# Patient Record
Sex: FEMALE | Race: OTHER | NOT HISPANIC OR LATINO
[De-identification: names, ages, dates, MRNs, and addresses within clinical notes are randomized per-mention and may not be internally consistent; named-entity substitution may affect disease eponyms.]

---

## 2017-06-05 ENCOUNTER — APPOINTMENT (OUTPATIENT)
Dept: CARDIOLOGY | Facility: CLINIC | Age: 47
End: 2017-06-05

## 2017-06-05 ENCOUNTER — NON-APPOINTMENT (OUTPATIENT)
Age: 47
End: 2017-06-05

## 2017-06-05 VITALS
WEIGHT: 146 LBS | HEART RATE: 86 BPM | HEIGHT: 66 IN | BODY MASS INDEX: 23.46 KG/M2 | SYSTOLIC BLOOD PRESSURE: 121 MMHG | OXYGEN SATURATION: 97 % | DIASTOLIC BLOOD PRESSURE: 80 MMHG

## 2017-06-05 DIAGNOSIS — Z82.3 FAMILY HISTORY OF STROKE: ICD-10-CM

## 2017-06-08 LAB — TSH SERPL-ACNC: 2.14 UIU/ML

## 2017-06-20 ENCOUNTER — APPOINTMENT (OUTPATIENT)
Dept: CV DIAGNOSITCS | Facility: HOSPITAL | Age: 47
End: 2017-06-20

## 2017-06-20 ENCOUNTER — OUTPATIENT (OUTPATIENT)
Dept: OUTPATIENT SERVICES | Facility: HOSPITAL | Age: 47
LOS: 1 days | End: 2017-06-20
Payer: COMMERCIAL

## 2017-06-20 DIAGNOSIS — R00.2 PALPITATIONS: ICD-10-CM

## 2017-06-20 PROCEDURE — 93016 CV STRESS TEST SUPVJ ONLY: CPT

## 2017-06-20 PROCEDURE — 93325 DOPPLER ECHO COLOR FLOW MAPG: CPT | Mod: 26,GC

## 2017-06-20 PROCEDURE — 93350 STRESS TTE ONLY: CPT | Mod: 26

## 2017-06-20 PROCEDURE — 93320 DOPPLER ECHO COMPLETE: CPT | Mod: 26,GC

## 2017-06-20 PROCEDURE — 93018 CV STRESS TEST I&R ONLY: CPT

## 2018-06-26 ENCOUNTER — RESULT REVIEW (OUTPATIENT)
Age: 48
End: 2018-06-26

## 2018-08-08 ENCOUNTER — RESULT REVIEW (OUTPATIENT)
Age: 48
End: 2018-08-08

## 2018-11-13 ENCOUNTER — FORM ENCOUNTER (OUTPATIENT)
Age: 48
End: 2018-11-13

## 2018-11-14 ENCOUNTER — OUTPATIENT (OUTPATIENT)
Dept: OUTPATIENT SERVICES | Facility: HOSPITAL | Age: 48
LOS: 1 days | End: 2018-11-14
Payer: COMMERCIAL

## 2018-11-14 ENCOUNTER — APPOINTMENT (OUTPATIENT)
Dept: MAMMOGRAPHY | Facility: IMAGING CENTER | Age: 48
End: 2018-11-14
Payer: COMMERCIAL

## 2018-11-14 ENCOUNTER — APPOINTMENT (OUTPATIENT)
Dept: ULTRASOUND IMAGING | Facility: IMAGING CENTER | Age: 48
End: 2018-11-14
Payer: COMMERCIAL

## 2018-11-14 DIAGNOSIS — R92.2 INCONCLUSIVE MAMMOGRAM: ICD-10-CM

## 2018-11-14 DIAGNOSIS — Z00.00 ENCOUNTER FOR GENERAL ADULT MEDICAL EXAMINATION WITHOUT ABNORMAL FINDINGS: ICD-10-CM

## 2018-11-14 PROCEDURE — 76641 ULTRASOUND BREAST COMPLETE: CPT | Mod: 26,50

## 2018-11-14 PROCEDURE — 76641 ULTRASOUND BREAST COMPLETE: CPT

## 2018-11-14 PROCEDURE — 77063 BREAST TOMOSYNTHESIS BI: CPT | Mod: 26

## 2018-11-14 PROCEDURE — 77067 SCR MAMMO BI INCL CAD: CPT | Mod: 26

## 2018-11-14 PROCEDURE — 77067 SCR MAMMO BI INCL CAD: CPT

## 2018-11-14 PROCEDURE — 77063 BREAST TOMOSYNTHESIS BI: CPT

## 2018-12-05 ENCOUNTER — OUTPATIENT (OUTPATIENT)
Dept: OUTPATIENT SERVICES | Facility: HOSPITAL | Age: 48
LOS: 1 days | End: 2018-12-05
Payer: COMMERCIAL

## 2018-12-05 ENCOUNTER — APPOINTMENT (OUTPATIENT)
Dept: RADIOLOGY | Facility: IMAGING CENTER | Age: 48
End: 2018-12-05
Payer: COMMERCIAL

## 2018-12-05 DIAGNOSIS — Z00.00 ENCOUNTER FOR GENERAL ADULT MEDICAL EXAMINATION WITHOUT ABNORMAL FINDINGS: ICD-10-CM

## 2018-12-05 PROCEDURE — 77080 DXA BONE DENSITY AXIAL: CPT | Mod: 26

## 2018-12-05 PROCEDURE — 77080 DXA BONE DENSITY AXIAL: CPT

## 2019-04-01 ENCOUNTER — TRANSCRIPTION ENCOUNTER (OUTPATIENT)
Age: 49
End: 2019-04-01

## 2019-06-05 ENCOUNTER — MESSAGE (OUTPATIENT)
Age: 49
End: 2019-06-05

## 2019-06-05 ENCOUNTER — APPOINTMENT (OUTPATIENT)
Dept: INTERNAL MEDICINE | Facility: CLINIC | Age: 49
End: 2019-06-05
Payer: COMMERCIAL

## 2019-06-05 VITALS
TEMPERATURE: 99.3 F | WEIGHT: 158 LBS | RESPIRATION RATE: 17 BRPM | HEIGHT: 66 IN | OXYGEN SATURATION: 97 % | SYSTOLIC BLOOD PRESSURE: 126 MMHG | DIASTOLIC BLOOD PRESSURE: 80 MMHG | HEART RATE: 83 BPM | BODY MASS INDEX: 25.39 KG/M2

## 2019-06-05 PROCEDURE — 99214 OFFICE O/P EST MOD 30 MIN: CPT

## 2019-11-11 ENCOUNTER — APPOINTMENT (OUTPATIENT)
Dept: INTERNAL MEDICINE | Facility: CLINIC | Age: 49
End: 2019-11-11

## 2020-02-06 ENCOUNTER — APPOINTMENT (OUTPATIENT)
Dept: MAMMOGRAPHY | Facility: IMAGING CENTER | Age: 50
End: 2020-02-06
Payer: COMMERCIAL

## 2020-02-06 ENCOUNTER — APPOINTMENT (OUTPATIENT)
Dept: ULTRASOUND IMAGING | Facility: IMAGING CENTER | Age: 50
End: 2020-02-06
Payer: COMMERCIAL

## 2020-02-06 ENCOUNTER — OUTPATIENT (OUTPATIENT)
Dept: OUTPATIENT SERVICES | Facility: HOSPITAL | Age: 50
LOS: 1 days | End: 2020-02-06
Payer: COMMERCIAL

## 2020-02-06 DIAGNOSIS — R92.2 INCONCLUSIVE MAMMOGRAM: ICD-10-CM

## 2020-02-06 DIAGNOSIS — Z00.00 ENCOUNTER FOR GENERAL ADULT MEDICAL EXAMINATION WITHOUT ABNORMAL FINDINGS: ICD-10-CM

## 2020-02-06 PROCEDURE — 77063 BREAST TOMOSYNTHESIS BI: CPT | Mod: 26

## 2020-02-06 PROCEDURE — 77067 SCR MAMMO BI INCL CAD: CPT | Mod: 26

## 2020-02-06 PROCEDURE — 76641 ULTRASOUND BREAST COMPLETE: CPT | Mod: 26,50

## 2020-02-06 PROCEDURE — 77063 BREAST TOMOSYNTHESIS BI: CPT

## 2020-02-06 PROCEDURE — 76641 ULTRASOUND BREAST COMPLETE: CPT

## 2020-02-06 PROCEDURE — 77067 SCR MAMMO BI INCL CAD: CPT

## 2020-10-19 ENCOUNTER — NON-APPOINTMENT (OUTPATIENT)
Age: 50
End: 2020-10-19

## 2020-10-19 ENCOUNTER — APPOINTMENT (OUTPATIENT)
Dept: CARDIOLOGY | Facility: CLINIC | Age: 50
End: 2020-10-19
Payer: COMMERCIAL

## 2020-10-19 VITALS
HEIGHT: 66 IN | DIASTOLIC BLOOD PRESSURE: 74 MMHG | OXYGEN SATURATION: 100 % | WEIGHT: 159 LBS | HEART RATE: 90 BPM | SYSTOLIC BLOOD PRESSURE: 118 MMHG | BODY MASS INDEX: 25.55 KG/M2

## 2020-10-19 PROCEDURE — 93000 ELECTROCARDIOGRAM COMPLETE: CPT

## 2020-10-19 PROCEDURE — 99204 OFFICE O/P NEW MOD 45 MIN: CPT

## 2020-10-19 RX ORDER — AMOXICILLIN 500 MG/1
500 TABLET, FILM COATED ORAL
Qty: 21 | Refills: 0 | Status: DISCONTINUED | COMMUNITY
Start: 2019-05-23 | End: 2020-10-19

## 2020-10-19 RX ORDER — CYCLOBENZAPRINE HYDROCHLORIDE 5 MG/1
5 TABLET, FILM COATED ORAL
Qty: 7 | Refills: 0 | Status: DISCONTINUED | COMMUNITY
Start: 2019-06-05 | End: 2020-10-19

## 2020-10-19 NOTE — HISTORY OF PRESENT ILLNESS
[FreeTextEntry1] : 50 year old odalis menopausal female with history of intermittent symptomatic palpitations that occur 1-2 times per month.\par \par She was last seen in the office in June of 2107 and was lost to follow up.. \par \par Palpitations are abrupt in onset that is not triggered by exertion or emotional distress. Of note, patient drinks little to no caffeine daily.\par \par This arrhythmia presents without provocation and can last between minutes to several hours in duration.\par Arrhythmia provokes the urge to cough, however,  patient has not tried to self terminate this heart rhythm with bearing down or vigorous coughing. \par \par She has an apple watch , which records heart rate 24 hours per day and she has graph results with sudden onset of rapid heart rate ranging in the high 100's to 120 bpm- as high as 160 bpm. \par \par Patient has undergone a stress echo in June of 2017. Testing revealed: \par Normal LV function\par LVEF 67%\par 11 METS of activity which was excellent for her age and gender. \par \par She returns today with an extensive record of her heart rate events. Most recent elevated heart rate at rest was on September 29, 2020.\par Max heart rate at 208 bpm recorded in August\par \par These elevated rates occur at most twice per month. Lasting seconds to a few minutes.\par She denies any dizziness, chest pain or shortness of breath.\par \par Patient presents today for a cardiac reevaluation. \par \par \par \par

## 2020-10-19 NOTE — REVIEW OF SYSTEMS
[Eyeglasses] : currently wearing eyeglasses [Shortness Of Breath] : shortness of breath [Palpitations] : palpitations [Negative] : Musculoskeletal

## 2020-10-19 NOTE — PHYSICAL EXAM
[General Appearance - Well Developed] : well developed [Well Groomed] : well groomed [Normal Appearance] : normal appearance [General Appearance - Well Nourished] : well nourished [No Deformities] : no deformities [General Appearance - In No Acute Distress] : no acute distress [Normal Conjunctiva] : the conjunctiva exhibited no abnormalities [Eyelids - No Xanthelasma] : the eyelids demonstrated no xanthelasmas [Normal Oral Mucosa] : normal oral mucosa [No Oral Pallor] : no oral pallor [Normal Jugular Venous A Waves Present] : normal jugular venous A waves present [No Oral Cyanosis] : no oral cyanosis [Normal Jugular Venous V Waves Present] : normal jugular venous V waves present [No Jugular Venous Richardson A Waves] : no jugular venous richardson A waves [Exaggerated Use Of Accessory Muscles For Inspiration] : no accessory muscle use [Respiration, Rhythm And Depth] : normal respiratory rhythm and effort [Auscultation Breath Sounds / Voice Sounds] : lungs were clear to auscultation bilaterally [Abdomen Soft] : soft [Abdomen Tenderness] : non-tender [Abdomen Mass (___ Cm)] : no abdominal mass palpated [Abnormal Walk] : normal gait [Nail Clubbing] : no clubbing of the fingernails [Cyanosis, Localized] : no localized cyanosis [Gait - Sufficient For Exercise Testing] : the gait was sufficient for exercise testing [Petechial Hemorrhages (___cm)] : no petechial hemorrhages [Skin Color & Pigmentation] : normal skin color and pigmentation [No Venous Stasis] : no venous stasis [Skin Lesions] : no skin lesions [] : no rash [No Skin Ulcers] : no skin ulcer [No Xanthoma] : no  xanthoma was observed [Affect] : the affect was normal [Oriented To Time, Place, And Person] : oriented to person, place, and time [Mood] : the mood was normal [No Anxiety] : not feeling anxious [Normal] : normal [Normal Rate] : normal [Rhythm Regular] : regular [Normal S1] : normal S1 [Normal S2] : normal S2 [2+] : Carotid: right 2+ [No Pitting Edema] : no pitting edema present [Rt] : no varicose veins of the right leg [Lt] : no varicose veins of the left leg

## 2020-10-19 NOTE — ASSESSMENT
[FreeTextEntry1] : 49 yo inititlaly seen in 2017 with episodes of rapid heart beat. She was starting odalis-menopause. Echo at time and labs were unrevealing. [facility] was cnacnelled.\par \par She returns today and symtpoms happen very infrequently but she is now wearing an apple watch and gave us records for several months. Episods of very rapid ( 150s) bpm. She also feels jame chest squeezing along with rhythm symtpoms. Not related to meals , stress. acitivy - Very frequestly during sleep.\par \par Will check thyroid , CMP today.\par Stress echo to exclude structural dz , exercise induced arrhthymia. Will order moniter although nor optimistic that we will identify anythignwith low frequency of episodes.

## 2020-10-19 NOTE — REASON FOR VISIT
[Initial Evaluation] : an initial evaluation of [FreeTextEntry2] : intermittent symptomatic palpitations- lasting minutes to hours

## 2020-10-20 LAB
ALBUMIN SERPL ELPH-MCNC: 4.8 G/DL
ALP BLD-CCNC: 65 U/L
ALT SERPL-CCNC: 18 U/L
ANION GAP SERPL CALC-SCNC: 15 MMOL/L
AST SERPL-CCNC: 17 U/L
BASOPHILS # BLD AUTO: 0.05 K/UL
BASOPHILS NFR BLD AUTO: 0.7 %
BILIRUB SERPL-MCNC: 0.4 MG/DL
BUN SERPL-MCNC: 9 MG/DL
CALCIUM SERPL-MCNC: 9.6 MG/DL
CHLORIDE SERPL-SCNC: 103 MMOL/L
CHOLEST SERPL-MCNC: 188 MG/DL
CHOLEST/HDLC SERPL: 3.6 RATIO
CO2 SERPL-SCNC: 24 MMOL/L
CREAT SERPL-MCNC: 0.65 MG/DL
EOSINOPHIL # BLD AUTO: 0.09 K/UL
EOSINOPHIL NFR BLD AUTO: 1.2 %
ESTIMATED AVERAGE GLUCOSE: 117 MG/DL
GLUCOSE SERPL-MCNC: 112 MG/DL
HBA1C MFR BLD HPLC: 5.7 %
HCT VFR BLD CALC: 43.8 %
HDLC SERPL-MCNC: 52 MG/DL
HGB BLD-MCNC: 14.3 G/DL
IMM GRANULOCYTES NFR BLD AUTO: 0.3 %
LDLC SERPL CALC-MCNC: 121 MG/DL
LYMPHOCYTES # BLD AUTO: 1.6 K/UL
LYMPHOCYTES NFR BLD AUTO: 21.9 %
MAN DIFF?: NORMAL
MCHC RBC-ENTMCNC: 29.6 PG
MCHC RBC-ENTMCNC: 32.6 GM/DL
MCV RBC AUTO: 90.7 FL
MONOCYTES # BLD AUTO: 0.5 K/UL
MONOCYTES NFR BLD AUTO: 6.8 %
NEUTROPHILS # BLD AUTO: 5.05 K/UL
NEUTROPHILS NFR BLD AUTO: 69.1 %
PLATELET # BLD AUTO: 319 K/UL
POTASSIUM SERPL-SCNC: 3.8 MMOL/L
PROT SERPL-MCNC: 7.5 G/DL
RBC # BLD: 4.83 M/UL
RBC # FLD: 12.8 %
SODIUM SERPL-SCNC: 141 MMOL/L
TRIGL SERPL-MCNC: 75 MG/DL
TSH SERPL-ACNC: 1.46 UIU/ML
WBC # FLD AUTO: 7.31 K/UL

## 2020-10-27 ENCOUNTER — APPOINTMENT (OUTPATIENT)
Dept: INTERNAL MEDICINE | Facility: CLINIC | Age: 50
End: 2020-10-27
Payer: COMMERCIAL

## 2020-10-27 VITALS
HEIGHT: 66 IN | WEIGHT: 159 LBS | TEMPERATURE: 97.6 F | HEART RATE: 77 BPM | SYSTOLIC BLOOD PRESSURE: 122 MMHG | DIASTOLIC BLOOD PRESSURE: 80 MMHG | BODY MASS INDEX: 25.55 KG/M2 | OXYGEN SATURATION: 100 % | RESPIRATION RATE: 17 BRPM

## 2020-10-27 DIAGNOSIS — Z00.00 ENCOUNTER FOR GENERAL ADULT MEDICAL EXAMINATION W/OUT ABNORMAL FINDINGS: ICD-10-CM

## 2020-10-27 DIAGNOSIS — Z87.39 PERSONAL HISTORY OF OTHER DISEASES OF THE MUSCULOSKELETAL SYSTEM AND CONNECTIVE TISSUE: ICD-10-CM

## 2020-10-27 DIAGNOSIS — R68.84 JAW PAIN: ICD-10-CM

## 2020-10-27 DIAGNOSIS — Z20.828 CONTACT WITH AND (SUSPECTED) EXPOSURE TO OTHER VIRAL COMMUNICABLE DISEASES: ICD-10-CM

## 2020-10-27 DIAGNOSIS — Z87.891 PERSONAL HISTORY OF NICOTINE DEPENDENCE: ICD-10-CM

## 2020-10-27 DIAGNOSIS — Z23 ENCOUNTER FOR IMMUNIZATION: ICD-10-CM

## 2020-10-27 DIAGNOSIS — I87.2 VENOUS INSUFFICIENCY (CHRONIC) (PERIPHERAL): ICD-10-CM

## 2020-10-27 DIAGNOSIS — Z87.898 PERSONAL HISTORY OF OTHER SPECIFIED CONDITIONS: ICD-10-CM

## 2020-10-27 DIAGNOSIS — E55.9 VITAMIN D DEFICIENCY, UNSPECIFIED: ICD-10-CM

## 2020-10-27 PROCEDURE — 90686 IIV4 VACC NO PRSV 0.5 ML IM: CPT

## 2020-10-27 PROCEDURE — 99072 ADDL SUPL MATRL&STAF TM PHE: CPT

## 2020-10-27 PROCEDURE — G0008: CPT

## 2020-10-27 PROCEDURE — 99396 PREV VISIT EST AGE 40-64: CPT | Mod: 25

## 2020-10-27 PROCEDURE — G0444 DEPRESSION SCREEN ANNUAL: CPT | Mod: NC,59

## 2020-10-27 RX ORDER — KETOROLAC TROMETHAMINE 10 MG/1
10 TABLET, FILM COATED ORAL
Qty: 15 | Refills: 0 | Status: DISCONTINUED | COMMUNITY
Start: 2019-06-03 | End: 2020-10-27

## 2020-10-27 RX ORDER — IBUPROFEN 800 MG/1
800 TABLET, FILM COATED ORAL
Qty: 21 | Refills: 0 | Status: DISCONTINUED | COMMUNITY
Start: 2019-05-30 | End: 2020-10-27

## 2020-10-27 RX ORDER — NORGESTIMATE AND ETHINYL ESTRADIOL 7DAYSX3 LO
0.18/0.215/0.25 KIT ORAL
Qty: 84 | Refills: 0 | Status: DISCONTINUED | COMMUNITY
Start: 2019-05-07 | End: 2020-10-27

## 2020-10-28 DIAGNOSIS — E55.9 VITAMIN D DEFICIENCY, UNSPECIFIED: ICD-10-CM

## 2020-10-28 LAB
25(OH)D3 SERPL-MCNC: 19.1 NG/ML
ANION GAP SERPL CALC-SCNC: 15 MMOL/L
BUN SERPL-MCNC: 7 MG/DL
CALCIUM SERPL-MCNC: 10.4 MG/DL
CHLORIDE SERPL-SCNC: 99 MMOL/L
CHOLEST SERPL-MCNC: 190 MG/DL
CO2 SERPL-SCNC: 26 MMOL/L
CREAT SERPL-MCNC: 0.66 MG/DL
ESTIMATED AVERAGE GLUCOSE: 114 MG/DL
GLUCOSE SERPL-MCNC: 98 MG/DL
HBA1C MFR BLD HPLC: 5.6 %
HDLC SERPL-MCNC: 53 MG/DL
LDLC SERPL CALC-MCNC: 120 MG/DL
NONHDLC SERPL-MCNC: 137 MG/DL
POTASSIUM SERPL-SCNC: 4.4 MMOL/L
SARS-COV-2 IGG SERPL IA-ACNC: 0.11 INDEX
SARS-COV-2 IGG SERPL QL IA: NEGATIVE
SODIUM SERPL-SCNC: 140 MMOL/L
TRIGL SERPL-MCNC: 84 MG/DL

## 2020-11-13 ENCOUNTER — APPOINTMENT (OUTPATIENT)
Dept: CV DIAGNOSITCS | Facility: HOSPITAL | Age: 50
End: 2020-11-13
Payer: COMMERCIAL

## 2020-11-13 ENCOUNTER — INPATIENT (INPATIENT)
Facility: HOSPITAL | Age: 50
LOS: 0 days | Discharge: HOME | DRG: 316 | End: 2020-11-14
Attending: INTERNAL MEDICINE | Admitting: INTERNAL MEDICINE
Payer: COMMERCIAL

## 2020-11-13 ENCOUNTER — NON-APPOINTMENT (OUTPATIENT)
Age: 50
End: 2020-11-13

## 2020-11-13 ENCOUNTER — OUTPATIENT (OUTPATIENT)
Dept: OUTPATIENT SERVICES | Facility: HOSPITAL | Age: 50
LOS: 1 days | End: 2020-11-13
Payer: COMMERCIAL

## 2020-11-13 VITALS
OXYGEN SATURATION: 100 % | DIASTOLIC BLOOD PRESSURE: 91 MMHG | SYSTOLIC BLOOD PRESSURE: 130 MMHG | WEIGHT: 158.95 LBS | HEART RATE: 94 BPM | RESPIRATION RATE: 20 BRPM | HEIGHT: 66 IN | TEMPERATURE: 98 F

## 2020-11-13 DIAGNOSIS — R00.2 PALPITATIONS: ICD-10-CM

## 2020-11-13 DIAGNOSIS — I31.3 PERICARDIAL EFFUSION (NONINFLAMMATORY): ICD-10-CM

## 2020-11-13 LAB
ALBUMIN SERPL ELPH-MCNC: 4.7 G/DL — SIGNIFICANT CHANGE UP (ref 3.3–5)
ALP SERPL-CCNC: 57 U/L — SIGNIFICANT CHANGE UP (ref 40–120)
ALT FLD-CCNC: 17 U/L — SIGNIFICANT CHANGE UP (ref 10–45)
ANION GAP SERPL CALC-SCNC: 14 MMOL/L — SIGNIFICANT CHANGE UP (ref 5–17)
APTT BLD: 27.8 SEC — SIGNIFICANT CHANGE UP (ref 27.5–35.5)
AST SERPL-CCNC: 18 U/L — SIGNIFICANT CHANGE UP (ref 10–40)
BASOPHILS # BLD AUTO: 0.05 K/UL — SIGNIFICANT CHANGE UP (ref 0–0.2)
BASOPHILS NFR BLD AUTO: 0.7 % — SIGNIFICANT CHANGE UP (ref 0–2)
BILIRUB SERPL-MCNC: 0.2 MG/DL — SIGNIFICANT CHANGE UP (ref 0.2–1.2)
BLD GP AB SCN SERPL QL: NEGATIVE — SIGNIFICANT CHANGE UP
BUN SERPL-MCNC: 8 MG/DL — SIGNIFICANT CHANGE UP (ref 7–23)
CALCIUM SERPL-MCNC: 9.8 MG/DL — SIGNIFICANT CHANGE UP (ref 8.4–10.5)
CHLORIDE SERPL-SCNC: 105 MMOL/L — SIGNIFICANT CHANGE UP (ref 96–108)
CO2 SERPL-SCNC: 22 MMOL/L — SIGNIFICANT CHANGE UP (ref 22–31)
CREAT SERPL-MCNC: 0.61 MG/DL — SIGNIFICANT CHANGE UP (ref 0.5–1.3)
EOSINOPHIL # BLD AUTO: 0.14 K/UL — SIGNIFICANT CHANGE UP (ref 0–0.5)
EOSINOPHIL NFR BLD AUTO: 1.9 % — SIGNIFICANT CHANGE UP (ref 0–6)
GLUCOSE SERPL-MCNC: 101 MG/DL — HIGH (ref 70–99)
HCT VFR BLD CALC: 42 % — SIGNIFICANT CHANGE UP (ref 34.5–45)
HGB BLD-MCNC: 14.2 G/DL — SIGNIFICANT CHANGE UP (ref 11.5–15.5)
IMM GRANULOCYTES NFR BLD AUTO: 0.1 % — SIGNIFICANT CHANGE UP (ref 0–1.5)
INR BLD: 1.07 RATIO — SIGNIFICANT CHANGE UP (ref 0.88–1.16)
LYMPHOCYTES # BLD AUTO: 2.18 K/UL — SIGNIFICANT CHANGE UP (ref 1–3.3)
LYMPHOCYTES # BLD AUTO: 29.2 % — SIGNIFICANT CHANGE UP (ref 13–44)
MAGNESIUM SERPL-MCNC: 1.9 MG/DL — SIGNIFICANT CHANGE UP (ref 1.6–2.6)
MCHC RBC-ENTMCNC: 29.6 PG — SIGNIFICANT CHANGE UP (ref 27–34)
MCHC RBC-ENTMCNC: 33.8 GM/DL — SIGNIFICANT CHANGE UP (ref 32–36)
MCV RBC AUTO: 87.7 FL — SIGNIFICANT CHANGE UP (ref 80–100)
MONOCYTES # BLD AUTO: 0.64 K/UL — SIGNIFICANT CHANGE UP (ref 0–0.9)
MONOCYTES NFR BLD AUTO: 8.6 % — SIGNIFICANT CHANGE UP (ref 2–14)
NEUTROPHILS # BLD AUTO: 4.44 K/UL — SIGNIFICANT CHANGE UP (ref 1.8–7.4)
NEUTROPHILS NFR BLD AUTO: 59.5 % — SIGNIFICANT CHANGE UP (ref 43–77)
NRBC # BLD: 0 /100 WBCS — SIGNIFICANT CHANGE UP (ref 0–0)
PHOSPHATE SERPL-MCNC: 3.5 MG/DL — SIGNIFICANT CHANGE UP (ref 2.5–4.5)
PLATELET # BLD AUTO: 325 K/UL — SIGNIFICANT CHANGE UP (ref 150–400)
POTASSIUM SERPL-MCNC: 3.7 MMOL/L — SIGNIFICANT CHANGE UP (ref 3.5–5.3)
POTASSIUM SERPL-SCNC: 3.7 MMOL/L — SIGNIFICANT CHANGE UP (ref 3.5–5.3)
PROT SERPL-MCNC: 7.7 G/DL — SIGNIFICANT CHANGE UP (ref 6–8.3)
PROTHROM AB SERPL-ACNC: 12.8 SEC — SIGNIFICANT CHANGE UP (ref 10.6–13.6)
RBC # BLD: 4.79 M/UL — SIGNIFICANT CHANGE UP (ref 3.8–5.2)
RBC # FLD: 12.4 % — SIGNIFICANT CHANGE UP (ref 10.3–14.5)
RH IG SCN BLD-IMP: NEGATIVE — SIGNIFICANT CHANGE UP
SARS-COV-2 RNA SPEC QL NAA+PROBE: SIGNIFICANT CHANGE UP
SODIUM SERPL-SCNC: 141 MMOL/L — SIGNIFICANT CHANGE UP (ref 135–145)
WBC # BLD: 7.46 K/UL — SIGNIFICANT CHANGE UP (ref 3.8–10.5)
WBC # FLD AUTO: 7.46 K/UL — SIGNIFICANT CHANGE UP (ref 3.8–10.5)

## 2020-11-13 PROCEDURE — 93306 TTE W/DOPPLER COMPLETE: CPT

## 2020-11-13 PROCEDURE — 99443: CPT

## 2020-11-13 PROCEDURE — 99232 SBSQ HOSP IP/OBS MODERATE 35: CPT

## 2020-11-13 PROCEDURE — 99291 CRITICAL CARE FIRST HOUR: CPT

## 2020-11-13 PROCEDURE — 93010 ELECTROCARDIOGRAM REPORT: CPT

## 2020-11-13 PROCEDURE — 93306 TTE W/DOPPLER COMPLETE: CPT | Mod: 26

## 2020-11-13 RX ORDER — CHLORHEXIDINE GLUCONATE 213 G/1000ML
1 SOLUTION TOPICAL
Refills: 0 | Status: DISCONTINUED | OUTPATIENT
Start: 2020-11-13 | End: 2020-11-14

## 2020-11-13 RX ORDER — MAGNESIUM SULFATE 500 MG/ML
1 VIAL (ML) INJECTION ONCE
Refills: 0 | Status: COMPLETED | OUTPATIENT
Start: 2020-11-13 | End: 2020-11-13

## 2020-11-13 RX ORDER — ERGOCALCIFEROL 1.25 MG/1
50000 CAPSULE ORAL
Refills: 0 | Status: DISCONTINUED | OUTPATIENT
Start: 2020-11-13 | End: 2020-11-14

## 2020-11-13 RX ORDER — POTASSIUM CHLORIDE 20 MEQ
20 PACKET (EA) ORAL ONCE
Refills: 0 | Status: COMPLETED | OUTPATIENT
Start: 2020-11-13 | End: 2020-11-13

## 2020-11-13 RX ADMIN — Medication 20 MILLIEQUIVALENT(S): at 20:49

## 2020-11-13 RX ADMIN — Medication 100 GRAM(S): at 20:49

## 2020-11-13 NOTE — ED PROCEDURE NOTE - ATTENDING CONTRIBUTION TO CARE
Dr. Wyman (Attending Physician)  I supervised the above procedure and agree with the documented note.

## 2020-11-13 NOTE — ED PROVIDER NOTE - PROGRESS NOTE DETAILS
Dr. Pawel Gracia, PGY-3: spoke w/ cards. Pt TBA CCU for planned drainage of pericardial effusion. Pre op labs ordered. Stat COVID sent. Informed pt of this. CCU under Dr. Correa Dr. Pawel Gracia, PGY-3: spoke w/ cards. There is concern on their ECHO for early signs of tamponade. Pt TBA CCU for planned drainage of pericardial effusion. Pre op labs ordered. Stat COVID sent. Informed pt of this. CCU under Dr. Correa

## 2020-11-13 NOTE — H&P ADULT - NSHPREVIEWOFSYSTEMS_GEN_ALL_CORE
REVIEW OF SYSTEMS:  CONSTITUTIONAL: No fever, chills, night sweats, or fatigue  EYES: No eye pain, visual disturbances, or discharge  ENMT:  No difficulty hearing, tinnitus, vertigo; No sinus or throat pain  NECK: No pain   RESPIRATORY: No cough, wheezing, or hemoptysis; No shortness of breath  CARDIOVASCULAR: No chest pain, + palpitations, no dizziness, or leg swelling  GASTROINTESTINAL: No abdominal or epigastric pain. No nausea, vomiting, or hematemesis; No diarrhea or constipation. No melena or hematochezia.  GENITOURINARY: No dysuria, frequency  NEUROLOGICAL: No headaches, memory loss, loss of strength, numbness, or tremors  SKIN: No rashes   LYMPH NODES: No enlarged glands  ENDOCRINE: No heat or cold intolerance; No hair loss  MUSCULOSKELETAL: + joint swelling  PSYCHIATRIC: No depression  HEME/LYMPH: No easy bruising, or bleeding gums

## 2020-11-13 NOTE — H&P ADULT - HISTORY OF PRESENT ILLNESS
This is a 49 y/o F w/ no significant PMH presenting w/ abnormal echo showing pericardial effusion sent from [facility] US lab. Pt reports intermittent palpitations and squeezing sensation in her chest for many years. States that they occur about 2 times a month. Symptoms worsened over the summer which prompted her to f/u w/ cardiologist Dr. Mittal. Pt reports that 1 week ago, she woke up with palpitations and a chest squeezing sensation that resolved after 24 hours. Denies any pain, exertional, or pleuritic components. Dr. Mittal scheduled Echo for today and pt was told to go directly to the ED after it was performed. No hx of prior pericardial effusions. No recent viral illnesses. Had stress echo done in 2017 due to palpitations which was normal. Of note, had a left dental implant done a week ago on 11/4 and completed a 7 day course of amoxicillin. Also has chronic joint stiffness and pain, mostly in the upper thighs, for which she is planning to be seen by Rheumatology.     Denies fevers, chills, headache, dizziness, blurry vision, chest pain, cough, shortness of breath, abdominal pain, n/v/d/c, urinary symptoms, MSK pain, rash, sick contact, sore throat.    Pt's vitals were T 98.7  /80 RR 18 POX 98% RA. Pt taken to cath labs but there was no pocket to do pericardiocentesis, pt transferred to CCU for further monitoring. This is a 49 y/o F w/ no significant PMH presenting w/ abnormal echo showing pericardial effusion sent from [facility] US lab. Pt reports intermittent palpitations and squeezing sensation in her chest for many years. States that they occur about 2 times a month. Symptoms worsened over the summer which prompted her to f/u w/ cardiologist Dr. Mittal. Pt reports that 1 week ago, she woke up with palpitations and a chest squeezing sensation that resolved after 24 hours. Denies any pain, exertional, or pleuritic components. Dr. Mittal scheduled Echo for today and pt was told to go directly to the ED after it was performed. No hx of prior pericardial effusions. No recent viral illnesses. Had stress echo done in 2017 due to palpitations which was normal. Of note, had a left dental implant done a week ago on 11/4 and completed a 7 day course of amoxicillin. Also has chronic joint stiffness and pain, mostly in the upper thighs, for which she is planning to be seen by Rheumatology.     Denies fevers, chills, headache, dizziness, blurry vision, cough, shortness of breath, abdominal pain, n/v/d/c, urinary symptoms, rash, sick contact, sore throat.    Pt's vitals were T 98.7  /80 RR 18 POX 98% RA. Pt taken to cath labs but there was no pocket to do pericardiocentesis, pt transferred to CCU for further monitoring.

## 2020-11-13 NOTE — ED PROVIDER NOTE - OBJECTIVE STATEMENT
51 y/o F w/ no sig PMH 51 y/o F w/ no sig PMH presenting w/ abnormal echo. Trauma room C. Pt sent from [facility] US lab. Was found to have pericardial effusion. Pt reports intermittent palpitations and squeezing sensation in her chest for many years. Symptoms worsened over the summer which prompted her to f/u w/ cardiologist Dr. Mittal. Was scheduled for an ECHO a few weeks ago but was unable to go. Last week had another episode of symptoms, but this time they lasted longer which caused her to be concerned. Dr. Mittal scheduled Echo for today and pt was told to go directly to the ED after it was performed. No hx of prior pericardial effusions. No recent viral illnesses. Had stress/echo 3 years ago that she reports is normal. Is planning to be seen by rheum for chronic muscle aches. Currently feels well. Denies fevers, chills, headache, dizziness, blurred vision, chest pain, cough, shortness of breath, abdominal pain, n/v/d/c, urinary symptoms, MSK pain, rash.

## 2020-11-13 NOTE — ED ADULT NURSE NOTE - CHPI ED NUR SYMPTOMS NEG
no weakness/no decreased eating/drinking/no tingling/no dizziness/no pain/no nausea/no chills/no fever/no vomiting

## 2020-11-13 NOTE — H&P ADULT - NSHPLABSRESULTS_GEN_ALL_CORE
14.2   7.46  )-----------( 325      ( 13 Nov 2020 17:02 )             42.0       11-13    141  |  105  |  8   ----------------------------<  101<H>  3.7   |  22  |  0.61    Ca    9.8      13 Nov 2020 17:02  Phos  3.5     11-13  Mg     1.9     11-13    TPro  7.7  /  Alb  4.7  /  TBili  0.2  /  DBili  x   /  AST  18  /  ALT  17  /  AlkPhos  57  11-13                  PT/INR - ( 13 Nov 2020 17:02 )   PT: 12.8 sec;   INR: 1.07 ratio         PTT - ( 13 Nov 2020 17:02 )  PTT:27.8 sec    Lactate Trend    < from: Transthoracic Echocardiogram (11.13.20 @ 13:41) >    Conclusions:  1. Normal left ventricular systolic function. No segmental  wall motion abnormalities.  2. Normal right ventricular size and function.  3. Estimated pulmonary artery systolic pressure equals 22  mm Hg, assuming right atrial pressure equals 8  mm Hg,  consistent with normal pulmonary pressures.  4. Large pericardial effusion. 1.3cm at posterior wall,1.4  lateral  2.15cm at the RV free wall and apex.  Resp.Variation noted at the mitral valve. There is evidence   of  RA invagination and Impaired RV filling and diastolic  collapse noted. These findings are consistent with  Tamponade physiology .  Discussed with Dr. Mittal/L[place]  *** No previous Echo exam.    < end of copied text >    < from: Cardiac Cath Lab - Adult (11.13.20 @ 18:43) >    DIAGNOSTIC RECOMMENDATIONS: Patient reaccessed in cardiac catheterization  laboratory with 4 chamber TTE done by echocardiographer and subcoastal  imaging by IC. Much reduced fluid compared to study earlier in day, even  with multiple positions using wedge, measuring less than < 2 cm in  multiple windows. CCU attending at bedside in cardiac catheterization  laboratory, who had visualized TTE earlier, in aggreement of reduction of  pericardial fluid. Risks of TAP evaluated to be higher than benefits at  this time given /90 and HR 90 with minimal space for adequate  pericardial space puncture. Plan to monitor hemodynamics closely in CCU,  repeat TTE in the morning to inform on conservative vs. invasive  management. Plan discussed with CCU team and fellow overnight.    < end of copied text >

## 2020-11-13 NOTE — ED PROVIDER NOTE - PHYSICAL EXAMINATION
Gen: NAD, AOx3, able to make needs known, non-toxic  Head: NCAT  HEENT: EOMI, oral mucosa moist, normal conjunctiva  Lung: CTAB, no respiratory distress, no wheezes/rhonchi/rales B/L, speaking in full sentences  CV: Tachycardic, no murmurs  Abd: soft, NTND, no guarding  MSK: no visible deformities  Neuro: Appears non focal  Skin: Warm, well perfused  Psych: normal affect

## 2020-11-13 NOTE — ED PROVIDER NOTE - ATTENDING CONTRIBUTION TO CARE
Dr. Wyman (Attending Physician)  I performed a history and physical exam of the patient and discussed their management with the resident. I reviewed the resident's note and agree with the documented findings and plan of care. My medical decision making and observations are found above.

## 2020-11-13 NOTE — H&P ADULT - NSHPSOCIALHISTORY_GEN_ALL_CORE
Lives at home with  and children. Social alcohol, former smoker, quit years ago. No recreational drug use

## 2020-11-13 NOTE — ED PROVIDER NOTE - CLINICAL SUMMARY MEDICAL DECISION MAKING FREE TEXT BOX
Dr. Wyman (Attending Physician)  Pt. pw palpitations, squeezing chest pain x years found to have tamponade physiology on outpt stress echo. +JVD, Not hypotension, borderline tachycardic. Will check labs, trop, cxr, admit to CCU. D/W cards fellow.

## 2020-11-13 NOTE — ED ADULT NURSE NOTE - OBJECTIVE STATEMENT
Pt is a 50y F p/w palpitations, squeezing chest pain x years. Pt had outpatient workup and was found to have pericardial effusion/tamponade appearance. +JVD. Pt denies N/V, dizziness, SOB, abdominal pain, fevers, cough, flu-like symptoms. A&Ox4, LEDESMA, distal pulses intact, abdomen soft nontender, skin intact. Side rails up for safety, call bell and personal items within reach, instructed to call for assistance, verbalizes understanding. Will continue to monitor.

## 2020-11-13 NOTE — H&P ADULT - ASSESSMENT
This is a 51 y/o F w/ no significant PMH presenting w/ abnormal echo showing large pericardial effusion with RA invagination and impaired RV filling and diastolic collapse noted c/w cardiac tamponade. Admitted to CCU for hemodynamic monitoring.     #Neuro  - AOx4  - No active issues     #Pulmonary   - On room air  - No active issues     #Cardiovascular   Cardiac tamponade   - Intermittent palpitations and chest squeezing for years, recently worsening in frequency   - No recent viral illnesses   - TTE 11/3 showing large pericardial effusion with evidence of RA invagination and impaired RV filling and diastolic collapse noted consistent with tamponade, normal LV systolic function, no WMAs, normal RV size and function, normal pulmonary pressures   - Pt taken to cath lab 11/13 with much reduced pericardial fluid compared to TTE earlier today. Risks of pericardiocentesis evaluated to be higher than benefits given /90 and HR 90 with minimal space for adequate pericardial space puncture  - Monitor hemodynamics closely   - Repeat TTE 11/14 to assess for conservative vs. invasive management   - F/u ESR, CRP given chronic joint pain and stiffness     #GI   - Regular diet    #Renal  - SCr 0.61  - No active issues    #Endo  - C/w vitamin D  - F/u A1c, TSH     #Heme  - No active issues     #ID  - Afebrile, no leukocytosis  - No active issues This is a 49 y/o F w/ no significant PMH presenting w/ abnormal echo showing large pericardial effusion with RA invagination and impaired RV filling and diastolic collapse noted c/w cardiac tamponade. Admitted to CCU for hemodynamic monitoring.     #Neuro  - AOx4  - No active issues     #Pulmonary   - On room air  - No active issues     #Cardiovascular   Cardiac tamponade   - Intermittent palpitations and chest squeezing for years, recently worsening in frequency   - No recent viral illnesses   - TTE 11/3 showing large pericardial effusion with evidence of RA invagination and impaired RV filling and diastolic collapse noted consistent with tamponade, normal LV systolic function, no WMAs, normal RV size and function, normal pulmonary pressures   - Pt taken to cath lab 11/13 with much reduced pericardial fluid compared to TTE earlier today. Risks of pericardiocentesis evaluated to be higher than benefits given /90 and HR 90 with minimal space for adequate pericardial space puncture  - Monitor hemodynamics closely   - Repeat TTE 11/14 to assess for conservative vs. invasive management   - F/u ESR, CRP given chronic joint pain and stiffness     #GI   - Regular diet    #Renal  - SCr 0.61  - No active issues    #Endo  - C/w vitamin D  - F/u A1c, TSH     #Heme  - No active issues     #ID  - Afebrile, no leukocytosis  - No active issues     #DVT PPX  - IMPROVE score 0    #GOC  - Full code This is a 51 y/o F w/ no significant PMH presenting w/ abnormal echo showing large pericardial effusion with RA invagination and impaired RV filling and diastolic collapse noted c/w cardiac tamponade. Admitted to CCU for hemodynamic monitoring.     #Neuro  - AOx4  - No active issues     #Pulmonary   - On room air  - No active issues     #Cardiovascular   Cardiac tamponade   - Intermittent palpitations and chest squeezing for years, recently worsening in frequency   - TTE 11/3 showing large pericardial effusion with evidence of RA invagination and impaired RV filling and diastolic collapse noted consistent with tamponade, normal LV systolic function, no WMAs, normal RV size and function, normal pulmonary pressures   - Pt taken to cath lab 11/13 with much reduced pericardial fluid compared to TTE earlier today. Risks of pericardiocentesis evaluated to be higher than benefits given /90 and HR 90 with minimal space for adequate pericardial space puncture  - Monitor hemodynamics closely   - Repeat TTE 11/14 to assess for conservative vs. invasive management   - F/u ESR, CRP given chronic joint pain and stiffness     #GI   - Regular diet    #Renal  - SCr 0.61  - No active issues    #Endo  - C/w vitamin D  - F/u A1c, TSH     #Heme  - No active issues     #ID  - Afebrile, no leukocytosis  - No active issues     #DVT PPX  - IMPROVE score 0    #GOC  - Full code This is a 49 y/o F w/ no significant PMH presenting w/ abnormal echo showing large pericardial effusion with RA invagination and impaired RV filling and diastolic collapse noted c/w cardiac tamponade. Admitted to CCU for hemodynamic monitoring.     #Neuro  - AOx4  - No active issues     #Pulmonary   - On room air  - No active issues     #Cardiovascular   Cardiac tamponade   - Intermittent palpitations and chest squeezing for years, recently worsening in frequency   - TTE 11/3 showing large pericardial effusion with evidence of RA invagination and impaired RV filling and diastolic collapse noted consistent with tamponade, normal LV systolic function, no WMAs, normal RV size and function, normal pulmonary pressures   - Etiology unclear, up-to-date on cancer screenings, differential also includes infectious, autoimmune (given joint stiffness symptoms), hypothyroidism   - Pt taken to cath lab 11/13 with much reduced pericardial fluid compared to TTE earlier today. Risks of pericardiocentesis evaluated to be higher than benefits given /90 and HR 90 with minimal space for adequate pericardial space puncture  - Monitor hemodynamics closely   - Repeat TTE 11/14 to assess for conservative vs. invasive management   - F/u ESR, CRP given chronic joint pain and stiffness     #GI   - Regular diet    #Renal  - SCr 0.61  - No active issues    #Endo  - C/w vitamin D  - F/u A1c, TSH     #Heme  - No active issues     #ID  - Afebrile, no leukocytosis  - No active issues     #DVT PPX  - IMPROVE score 0    #GOC  - Full code

## 2020-11-13 NOTE — CHART NOTE - NSCHARTNOTEFT_GEN_A_CORE
patient assessed in echo lab, describes palpitations and vague chest discomfort. Vitals notable for tachycardia, low 100s on my assessment however documented in 140s during TTE. Images reviewed - echocardiographic evidence of tamponade. Discussed with interventional and CCU team, plan for transfer to ED for routine labs and rapid COVID swab followed by cath lab for drainage followed by admission to CCU.

## 2020-11-13 NOTE — H&P ADULT - NSHPPHYSICALEXAM_GEN_ALL_CORE
ICU Vital Signs Last 24 Hrs  T(C): 37.1 (13 Nov 2020 19:40), Max: 37.1 (13 Nov 2020 19:40)  T(F): 98.7 (13 Nov 2020 19:40), Max: 98.7 (13 Nov 2020 19:40)  HR: 100 (13 Nov 2020 20:00) (74 - 100)  BP: 147/76 (13 Nov 2020 20:00) (126/78 - 153/89)  BP(mean): 105 (13 Nov 2020 20:00) (95 - 105)  ABP: --  ABP(mean): --  RR: 16 (13 Nov 2020 20:00) (16 - 20)  SpO2: 99% (13 Nov 2020 20:00) (98% - 100%)    PHYSICAL EXAM:  GENERAL: NAD, lying in bed comfortably  HEAD:  Atraumatic, Normocephalic  EYES: EOMI, PERRLA, conjunctiva and sclera clear  ENT: Moist mucous membranes. Tooth missing on L, mild erythema and jaw tenderness   NECK: Supple, +JVD   CHEST/LUNG: Clear to auscultation bilaterally; No rales  HEART: Regular rate and rhythm; No murmurs  ABDOMEN: Bowel sounds present; Soft, Nontender, Nondistended  EXTREMITIES:  2+ Peripheral Pulses, brisk capillary refill. No clubbing, cyanosis, or edema  NERVOUS SYSTEM:  Alert & Oriented X3, speech clear. No deficits   MSK: FROM all 4 extremities, full and equal strength  SKIN: No rashes or lesions ICU Vital Signs Last 24 Hrs  T(C): 37.1 (13 Nov 2020 19:40), Max: 37.1 (13 Nov 2020 19:40)  T(F): 98.7 (13 Nov 2020 19:40), Max: 98.7 (13 Nov 2020 19:40)  HR: 100 (13 Nov 2020 20:00) (74 - 100)  BP: 147/76 (13 Nov 2020 20:00) (126/78 - 153/89)  BP(mean): 105 (13 Nov 2020 20:00) (95 - 105)  ABP: --  ABP(mean): --  RR: 16 (13 Nov 2020 20:00) (16 - 20)  SpO2: 99% (13 Nov 2020 20:00) (98% - 100%)    PHYSICAL EXAM:  GENERAL: NAD, lying in bed comfortably  HEAD:  Atraumatic, Normocephalic  EYES: EOMI, PERRLA, conjunctiva and sclera clear  ENT: Moist mucous membranes. Tooth missing on L, mild erythema and jaw tenderness   NECK: Supple, +JVD   CHEST/LUNG: Clear to auscultation bilaterally; No rales  HEART: Regular rate and rhythm; No murmurs or rubs   ABDOMEN: Bowel sounds present; Soft, Nontender, Nondistended  EXTREMITIES:  2+ Peripheral Pulses, brisk capillary refill. No clubbing, cyanosis, or edema  NERVOUS SYSTEM:  Alert & Oriented X3, speech clear. No deficits   MSK: FROM all 4 extremities, full and equal strength  SKIN: No rashes or lesions

## 2020-11-13 NOTE — ED ADULT TRIAGE NOTE - CHIEF COMPLAINT QUOTE
Had Echocardiogram upstairs, sent here for "fluid around the heart", with shortness of breathe when going up the stairs

## 2020-11-13 NOTE — ED ADULT NURSE NOTE - NURSING MUSC STRENGTH
HPI:    Patient ID: Atiya Lopez is a 74year old male.     HPI  Patient presents with:  Stress Test: f/u test results that Dr. Josh Rashid did, pt did get results but just following up with you  Referral: pt will need referral to neurologist due to anuerysm fo
hand grasp, leg strength strong and equal bilaterally

## 2020-11-13 NOTE — PROGRESS NOTE ADULT - SUBJECTIVE AND OBJECTIVE BOX
KESHA FIGUEROA  MRN-[id number]  Patient is a 50y old  Female who presents with a chief complaint of abnormal echo (13 Nov 2020 20:43)    HPI:  This is a 49 y/o F w/ no significant PMH presenting w/ abnormal echo showing pericardial effusion sent from [facility] US lab. Pt reports intermittent palpitations and squeezing sensation in her chest for many years. States that they occur about 2 times a month. Symptoms worsened over the summer which prompted her to f/u w/ cardiologist Dr. Mittal. Pt reports that 1 week ago, she woke up with palpitations and a chest squeezing sensation that resolved after 24 hours. Denies any pain, exertional, or pleuritic components. Dr. Mittal scheduled Echo for today and pt was told to go directly to the ED after it was performed. No hx of prior pericardial effusions. No recent viral illnesses. Had stress echo done in 2017 due to palpitations which was normal. Of note, had a left dental implant done a week ago on 11/4 and completed a 7 day course of amoxicillin. Also has chronic joint stiffness and pain, mostly in the upper thighs, for which she is planning to be seen by Rheumatology.     Denies fevers, chills, headache, dizziness, blurry vision, cough, shortness of breath, abdominal pain, n/v/d/c, urinary symptoms, rash, sick contact, sore throat.    Pt's vitals were T 98.7  /80 RR 18 POX 98% RA. Pt taken to cath labs but there was no pocket to do pericardiocentesis, pt transferred to CCU for further monitoring.  (13 Nov 2020 20:43)      Hospital Course:    24 HOUR EVENTS:    REVIEW OF SYSTEMS:   Constitutional: No weakness, fevers, or chills  Eyes/ENT: No visual changes  Respiratory: No cough, wheezing, hemoptysis  Cardiovascular: No chest pain, no palpitations  Gastrointestinal: No abdominal pain. No nausea, vomiting, hematemesis.   Genitourinary: No dysuria  Neurological: No numbness, no weakness  Skin: No itching, rashes    ICU Vital Signs Last 24 Hrs  T(C): 37.1 (13 Nov 2020 19:40), Max: 37.1 (13 Nov 2020 19:40)  T(F): 98.7 (13 Nov 2020 19:40), Max: 98.7 (13 Nov 2020 19:40)  HR: 98 (13 Nov 2020 23:00) (74 - 100)  BP: 137/81 (13 Nov 2020 23:00) (122/76 - 153/89)  BP(mean): 123 (13 Nov 2020 23:00) (94 - 123)  ABP: --  ABP(mean): --  RR: 18 (13 Nov 2020 23:00) (16 - 20)  SpO2: 98% (13 Nov 2020 23:00) (98% - 100%)      CVP(mm Hg): --  CO: --  CI: --  PA: --  PA(mean): --  PA(direct): --  PCWP: --  LA: --  RA: --  SVR: --  SVRI: --  PVR: --  PVRI: --  I&O's Summary    13 Nov 2020 07:01  -  13 Nov 2020 23:41  --------------------------------------------------------  IN: 100 mL / OUT: 0 mL / NET: 100 mL        CAPILLARY BLOOD GLUCOSE    CAPILLARY BLOOD GLUCOSE          PHYSICAL EXAM:   General: No acute distress  Eyes: EOMI, PERRLA, conjunctiva and sclera clear  Chest/Lung: CTAB, no wheezes, rales, or rhonchi  Heart: Regular rate, regular rhythm. Normal S1/S2. No murmurs, rubs, or gallops.  Abdomen: Soft, nontender, nondistended. Normal bowel sounds.  Extremites: 2+ peripheral pulses B/L. No clubbing, cyanosis, or edema.  Neurology: A&O x3, no focal deficits  Skin: No rashes or lesions  ============================I/O===========================   I&O's Detail    13 Nov 2020 07:01  -  13 Nov 2020 23:41  --------------------------------------------------------  IN:    IV PiggyBack: 100 mL  Total IN: 100 mL    OUT:  Total OUT: 0 mL    Total NET: 100 mL        ============================ LABS =========================                        14.2   7.46  )-----------( 325      ( 13 Nov 2020 17:02 )             42.0     11-13    141  |  105  |  8   ----------------------------<  101<H>  3.7   |  22  |  0.61    Ca    9.8      13 Nov 2020 17:02  Phos  3.5     11-13  Mg     1.9     11-13    TPro  7.7  /  Alb  4.7  /  TBili  0.2  /  DBili  x   /  AST  18  /  ALT  17  /  AlkPhos  57  11-13                LIVER FUNCTIONS - ( 13 Nov 2020 17:02 )  Alb: 4.7 g/dL / Pro: 7.7 g/dL / ALK PHOS: 57 U/L / ALT: 17 U/L / AST: 18 U/L / GGT: x           PT/INR - ( 13 Nov 2020 17:02 )   PT: 12.8 sec;   INR: 1.07 ratio         PTT - ( 13 Nov 2020 17:02 )  PTT:27.8 sec        ======================Micro/Rad/Cardio=================  Telemetry: Reviewed   EKG: Reviewed  CXR: Reviewed  Culture: Reviewed   Echo: Reviewed  Cath: Reviewed  ======================================================  PAST MEDICAL & SURGICAL HISTORY:  No pertinent past medical history    No significant past surgical history      ====================ASSESSMENT ==============    Plan:  ====================== NEUROLOGY=====================    ==================== RESPIRATORY======================  Mechanical Ventilation:      ====================CARDIOVASCULAR==================    ===================HEMATOLOGIC/ONC ===================    ===================== RENAL =========================  Continue monitoring urine output    ==================== GASTROINTESTINAL===================  ergocalciferol 64855 Unit(s) Oral <User Schedule>    =======================    ENDOCRINE  =====================    ========================INFECTIOUS DISEASE================      Patient requires continuous monitoring with bedside rhythm monitoring, pulse ox monitoring, and intermittent blood gas analysis. Care plan discussed with ICU care team. Patient remained critical and at risk for life threatening decompensation.  Patient seen, examined and plan discussed with CCU team during rounds.     I have personally provided 35 minutes of critical care time excluding time spent on separate procedures.    By signing my name below, I, Mary Nieto, attest that this documentation has been prepared under the direction and in the presence of PAVEL Caceres.   Electronically signed: Stacie Nguyễn, 11-13-20 @ 23:41    I,  PAVEL Caceres.  , personally performed the services described in this documentation. all medical record entries made by the francineibyaneth were at my direction and in my presence. I have reviewed the chart and agree that the record reflects my personal performance and is accurate and complete  Electronically signed:  PAVEL Caceres.        KESHA FIGUEROA  MRN-[id number]  Patient is a 50y old  Female who presents with a chief complaint of abnormal echo (13 Nov 2020 20:43)    HPI:  This is a 51 y/o F w/ no significant PMH presenting w/ abnormal echo showing pericardial effusion sent from [facility] US lab. Pt reports intermittent palpitations and squeezing sensation in her chest for many years. States that they occur about 2 times a month. Symptoms worsened over the summer which prompted her to f/u w/ cardiologist Dr. Mittal. Pt reports that 1 week ago, she woke up with palpitations and a chest squeezing sensation that resolved after 24 hours. Denies any pain, exertional, or pleuritic components. Dr. Mittal scheduled Echo for today and pt was told to go directly to the ED after it was performed. No hx of prior pericardial effusions. No recent viral illnesses. Had stress echo done in 2017 due to palpitations which was normal. Of note, had a left dental implant done a week ago on 11/4 and completed a 7 day course of amoxicillin. Also has chronic joint stiffness and pain, mostly in the upper thighs, for which she is planning to be seen by Rheumatology.     Denies fevers, chills, headache, dizziness, blurry vision, cough, shortness of breath, abdominal pain, n/v/d/c, urinary symptoms, rash, sick contact, sore throat.    Pt's vitals were T 98.7  /80 RR 18 POX 98% RA. Pt taken to cath labs but there was no pocket to do pericardiocentesis, pt transferred to CCU for further monitoring.  (13 Nov 2020 20:43)      Hospital Course:  11/13 Admitted to CCU for monitoring of Cardiac tamponade      24 HOUR EVENTS:    REVIEW OF SYSTEMS:   Constitutional: No weakness, fevers, or chills  Eyes/ENT: No visual changes  Respiratory: No cough, wheezing, hemoptysis  Cardiovascular: No chest pain, no palpitations  Gastrointestinal: No abdominal pain. No nausea, vomiting, hematemesis.   Genitourinary: No dysuria  Neurological: No numbness, no weakness  Skin: No itching, rashes    ICU Vital Signs Last 24 Hrs  T(C): 37.1 (13 Nov 2020 19:40), Max: 37.1 (13 Nov 2020 19:40)  T(F): 98.7 (13 Nov 2020 19:40), Max: 98.7 (13 Nov 2020 19:40)  HR: 98 (13 Nov 2020 23:00) (74 - 100)  BP: 137/81 (13 Nov 2020 23:00) (122/76 - 153/89)  BP(mean): 123 (13 Nov 2020 23:00) (94 - 123)  ABP: --  ABP(mean): --  RR: 18 (13 Nov 2020 23:00) (16 - 20)  SpO2: 98% (13 Nov 2020 23:00) (98% - 100%)      I&O's Summary    13 Nov 2020 07:01  -  13 Nov 2020 23:41  --------------------------------------------------------  IN: 100 mL / OUT: 0 mL / NET: 100 mL          PHYSICAL EXAM:   General: No acute distress  Eyes: EOMI, PERRLA, conjunctiva and sclera clear  Chest/Lung: CTAB, no wheezes, rales, or rhonchi  Neck: +JVD.   Heart: Regular rate, regular rhythm. Normal S1/S2. No murmurs, rubs, or gallops.  Abdomen: Soft, nontender, nondistended. Normal bowel sounds.  Extremites: 2+ peripheral pulses B/L. No clubbing, cyanosis, or edema.  Neurology: A&O x3, no focal deficits  Skin: No rashes or lesions  ============================I/O===========================   I&O's Detail    13 Nov 2020 07:01  -  13 Nov 2020 23:41  --------------------------------------------------------  IN:    IV PiggyBack: 100 mL  Total IN: 100 mL    OUT:  Total OUT: 0 mL    Total NET: 100 mL        ============================ LABS =========================                        14.2   7.46  )-----------( 325      ( 13 Nov 2020 17:02 )             42.0     11-13    141  |  105  |  8   ----------------------------<  101<H>  3.7   |  22  |  0.61    Ca    9.8      13 Nov 2020 17:02  Phos  3.5     11-13  Mg     1.9     11-13    TPro  7.7  /  Alb  4.7  /  TBili  0.2  /  DBili  x   /  AST  18  /  ALT  17  /  AlkPhos  57  11-13                LIVER FUNCTIONS - ( 13 Nov 2020 17:02 )  Alb: 4.7 g/dL / Pro: 7.7 g/dL / ALK PHOS: 57 U/L / ALT: 17 U/L / AST: 18 U/L / GGT: x           PT/INR - ( 13 Nov 2020 17:02 )   PT: 12.8 sec;   INR: 1.07 ratio         PTT - ( 13 Nov 2020 17:02 )  PTT:27.8 sec        ======================Micro/Rad/Cardio=================  Telemetry: Reviewed   Echo: Reviewed  Cath: Reviewed  ======================================================  PAST MEDICAL & SURGICAL HISTORY:  No pertinent past medical history    No significant past surgical history      ====================ASSESSMENT ==============  Cardiac Tamponade  Normal Ventricular function    Plan:  ====================== NEUROLOGY=====================  A&Ox3  - Nonfocal, continue to monitor neuro status per ICU protocol.     ==================== RESPIRATORY======================  Comfortable on RA, SpO2 98%  - Cont to monitor SpO2 via pulse oximetry.     ====================CARDIOVASCULAR==================  Cardiac Tamponade  - TTE 11/3: Large pericardial effusion with R invagination and impaired RV filling and diastolic collapse consistent with cardiac tamponade, normal LV systolic function, no WMAs, normal RV size and function, normal pulmonary pressures   - Reduced pericardial effusion s/p cath tonight  - Repeat TTE 11/14 to assess for conservative vs. invasive management     ===================HEMATOLOGIC/ONC ===================  H/H 14.2/42 stable.   - Continue to monitor hemoglobin and hematocrit levels.    ===================== RENAL =========================  No acute issues  -Scr within normal limits   - Continue to monitor I/Os, BUN/Creatinine, and urine output.   -  Replete lytes PRN. Keep K> 4 and Mg >2.     ==================== GASTROINTESTINAL===================  Regular Diet    ergocalciferol 36036 Unit(s) Oral <User Schedule>    =======================    ENDOCRINE  =====================  no acute issues  - Monitor blood glucose level.     ========================INFECTIOUS DISEASE================  Afebrile, WBC within normal limits.  - Monitor for fever/leukocytosis     Patient requires continuous monitoring with bedside rhythm monitoring, pulse ox monitoring, and intermittent blood gas analysis. Care plan discussed with ICU care team. Patient remained critical and at risk for life threatening decompensation.  Patient seen, examined and plan discussed with CCU team during rounds.     I have personally provided 35 minutes of critical care time excluding time spent on separate procedures.    By signing my name below, I, Mary Nieto, attest that this documentation has been prepared under the direction and in the presence of PAVEL Caceres.   Electronically signed: Elvira Nguyễn, 11-13-20 @ 23:41    I,  JONAS Caceres  , personally performed the services described in this documentation. all medical record entries made by the elvira were at my direction and in my presence. I have reviewed the chart and agree that the record reflects my personal performance and is accurate and complete  Electronically signed:  JONAS Caceres        KESHA FIGUEROA  MRN-[id number]  Patient is a 50y old  Female who presents with a chief complaint of abnormal echo (13 Nov 2020 20:43)    HPI:  This is a 51 y/o F w/ no significant PMH presenting w/ abnormal echo showing pericardial effusion sent from [facility] US lab. Pt reports intermittent palpitations and squeezing sensation in her chest for many years. States that they occur about 2 times a month. Symptoms worsened over the summer which prompted her to f/u w/ cardiologist Dr. Mittal. Pt reports that 1 week ago, she woke up with palpitations and a chest squeezing sensation that resolved after 24 hours. Denies any pain, exertional, or pleuritic components. Dr. Mittal scheduled Echo for today and pt was told to go directly to the ED after it was performed. No hx of prior pericardial effusions. No recent viral illnesses. Had stress echo done in 2017 due to palpitations which was normal. Of note, had a left dental implant done a week ago on 11/4 and completed a 7 day course of amoxicillin. Also has chronic joint stiffness and pain, mostly in the upper thighs, for which she is planning to be seen by Rheumatology.     Denies fevers, chills, headache, dizziness, blurry vision, cough, shortness of breath, abdominal pain, n/v/d/c, urinary symptoms, rash, sick contact, sore throat.    Pt's vitals were T 98.7  /80 RR 18 POX 98% RA. Pt taken to cath labs but there was no pocket to do pericardiocentesis, pt transferred to CCU for further monitoring.  (13 Nov 2020 20:43)      Hospital Course:  11/13 Admitted to CCU for monitoring of Cardiac tamponade      24 HOUR EVENTS:    REVIEW OF SYSTEMS:   Constitutional: No weakness, fevers, or chills  Eyes/ENT: No visual changes  Respiratory: No cough, wheezing, hemoptysis  Cardiovascular: No chest pain, no palpitations  Gastrointestinal: No abdominal pain. No nausea, vomiting, hematemesis.   Genitourinary: No dysuria  Neurological: No numbness, no weakness  Skin: No itching, rashes    ICU Vital Signs Last 24 Hrs  T(C): 37.1 (13 Nov 2020 19:40), Max: 37.1 (13 Nov 2020 19:40)  T(F): 98.7 (13 Nov 2020 19:40), Max: 98.7 (13 Nov 2020 19:40)  HR: 98 (13 Nov 2020 23:00) (74 - 100)  BP: 137/81 (13 Nov 2020 23:00) (122/76 - 153/89)  BP(mean): 123 (13 Nov 2020 23:00) (94 - 123)  ABP: --  ABP(mean): --  RR: 18 (13 Nov 2020 23:00) (16 - 20)  SpO2: 98% (13 Nov 2020 23:00) (98% - 100%)      I&O's Summary    13 Nov 2020 07:01  -  13 Nov 2020 23:41  --------------------------------------------------------  IN: 100 mL / OUT: 0 mL / NET: 100 mL          PHYSICAL EXAM:   General: No acute distress  Eyes: EOMI, PERRLA, conjunctiva and sclera clear  Chest/Lung: CTAB, no wheezes, rales, or rhonchi  Neck: +JVD.   Heart: Regular rate, regular rhythm. Normal S1/S2. No murmurs, rubs, or gallops.  Abdomen: Soft, nontender, nondistended. Normal bowel sounds.  Extremites: 2+ peripheral pulses B/L. No clubbing, cyanosis, or edema.  Neurology: A&O x3, no focal deficits  Skin: No rashes or lesions  ============================I/O===========================   I&O's Detail    13 Nov 2020 07:01  -  13 Nov 2020 23:41  --------------------------------------------------------  IN:    IV PiggyBack: 100 mL  Total IN: 100 mL    OUT:  Total OUT: 0 mL    Total NET: 100 mL        ============================ LABS =========================                        14.2   7.46  )-----------( 325      ( 13 Nov 2020 17:02 )             42.0     11-13    141  |  105  |  8   ----------------------------<  101<H>  3.7   |  22  |  0.61    Ca    9.8      13 Nov 2020 17:02  Phos  3.5     11-13  Mg     1.9     11-13    TPro  7.7  /  Alb  4.7  /  TBili  0.2  /  DBili  x   /  AST  18  /  ALT  17  /  AlkPhos  57  11-13                LIVER FUNCTIONS - ( 13 Nov 2020 17:02 )  Alb: 4.7 g/dL / Pro: 7.7 g/dL / ALK PHOS: 57 U/L / ALT: 17 U/L / AST: 18 U/L / GGT: x           PT/INR - ( 13 Nov 2020 17:02 )   PT: 12.8 sec;   INR: 1.07 ratio         PTT - ( 13 Nov 2020 17:02 )  PTT:27.8 sec        ======================Micro/Rad/Cardio=================  Telemetry: Reviewed   Echo: Reviewed  Cath: Reviewed  ======================================================  PAST MEDICAL & SURGICAL HISTORY:  No pertinent past medical history    No significant past surgical history      ====================ASSESSMENT ==============  Cardiac Tamponade  Normal Ventricular function    Plan:  ====================== NEUROLOGY=====================  A&Ox3  - Nonfocal, continue to monitor neuro status per ICU protocol.     ==================== RESPIRATORY======================  Comfortable on RA, SpO2 98%  - Cont to monitor SpO2 via pulse oximetry.     ====================CARDIOVASCULAR==================  Cardiac Tamponade  - TTE 11/3: Large pericardial effusion with R invagination and impaired RV filling and diastolic collapse consistent with cardiac tamponade, normal LV systolic function, no WMAs, normal RV size and function, normal pulmonary pressures   - Reduced pericardial effusion s/p cath tonight  - Repeat TTE 11/14 to assess for conservative vs. invasive management     ===================HEMATOLOGIC/ONC ===================  H/H 14.2/42 stable.   - Continue to monitor hemoglobin and hematocrit levels.    ===================== RENAL =========================  No acute issues  -Scr within normal limits   - Continue to monitor I/Os, BUN/Creatinine, and urine output.   -  Replete lytes PRN. Keep K> 4 and Mg >2.     ==================== GASTROINTESTINAL===================  Regular Diet    ergocalciferol 54262 Unit(s) Oral <User Schedule>    =======================    ENDOCRINE  =====================  no acute issues  - Monitor blood glucose level.     ========================INFECTIOUS DISEASE================  Afebrile, WBC within normal limits.  - Monitor for fever/leukocytosis     Patient requires continuous monitoring with bedside rhythm monitoring, pulse ox monitoring, and intermittent blood gas analysis. Care plan discussed with ICU care team. Patient remained critical and at risk for life threatening decompensation.  Patient seen, examined and plan discussed with CCU team during rounds.     I have personally provided 35 minutes of critical care time excluding time spent on separate procedures.    By signing my name below, I, Mary Nieto, attest that this documentation has been prepared under the direction and in the presence of PAVEL Caceres.   Electronically signed: Stacie Nguyễn, 11-13-20 @ 23:41    I,  JONAS Caceres  , personally performed the services described in this documentation. all medical record entries made by the francineibyaneth were at my direction and in my presence. I have reviewed the chart and agree that the record reflects my personal performance and is accurate and complete  Electronically signed:  PAVEL Caceres.       ----------------------------FELLOW NOTE---------------------------------    Patient had evidence of tamponade on echo, however in the cath lab, the patient had no safe pocket for pericardiocentesis.   While in the CICU, the patient's SBP has been in the 110s, so patient did not require any fluids so far  Echo ordered for AM - if patient has a tappable pocket, then would consult IR for pericardiocentesis with drain  Will send rheumatologic workup overnight as patient has been complaining of MSK issues (aches, pains of thighs). No weight loss to be concerned for malignancy, and patient is up to date on her mammograms. No recent URI sxs.     Alanis Lara MD  Cardiology Fellow, PGY-4  [phone]  For all other Cardiology service contact information, go to [email or web address] and use [email or web address]" to login. KESHA FIGUEROA  MRN-[id number]  Patient is a 50y old  Female who presents with a chief complaint of abnormal echo (13 Nov 2020 20:43)    HPI:  This is a 49 y/o F w/ no significant PMH presenting w/ abnormal echo showing pericardial effusion sent from [facility] lab. Pt reports intermittent palpitations and squeezing sensation in her chest for many years. States that they occur about 2 times a month. Symptoms worsened over the summer which prompted her to f/u w/ cardiologist Dr. Mittal. Pt reports that 1 week ago, she woke up with palpitations and a chest squeezing sensation that resolved after 24 hours. Denies any pain, exertional, or pleuritic components. Dr. Mittal scheduled Echo for today and pt was told to go directly to the ED after it was performed. No hx of prior pericardial effusions. No recent viral illnesses. Had stress echo done in 2017 due to palpitations which was normal. Of note, had a left dental implant done a week ago on 11/4 and completed a 7 day course of amoxicillin. Also has chronic joint stiffness and pain, mostly in the upper thighs, for which she is planning to be seen by Rheumatology.     Denies fevers, chills, headache, dizziness, blurry vision, cough, shortness of breath, abdominal pain, n/v/d/c, urinary symptoms, rash, sick contact, sore throat.    Pt's vitals were T 98.7  /80 RR 18 POX 98% RA. Pt taken to cath labs but there was no pocket to do pericardiocentesis, pt transferred to CCU for further monitoring.  (13 Nov 2020 20:43)      Hospital Course:  11/13 Admitted to CCU for monitoring of Cardiac tamponade      24 HOUR EVENTS: Pericardial effusion with suboptimal window for cath lab drainage. Admitted to CICU.     REVIEW OF SYSTEMS:   Constitutional: No weakness, fevers, or chills  Eyes/ENT: No visual changes  Respiratory: No cough, wheezing, hemoptysis  Cardiovascular: No chest pain, no palpitations  Gastrointestinal: No abdominal pain. No nausea, vomiting, hematemesis.   Genitourinary: No dysuria  Neurological: No numbness, no weakness  Skin: No itching, rashes    ICU Vital Signs Last 24 Hrs  T(C): 37.1 (13 Nov 2020 19:40), Max: 37.1 (13 Nov 2020 19:40)  T(F): 98.7 (13 Nov 2020 19:40), Max: 98.7 (13 Nov 2020 19:40)  HR: 98 (13 Nov 2020 23:00) (74 - 100)  BP: 137/81 (13 Nov 2020 23:00) (122/76 - 153/89)  BP(mean): 123 (13 Nov 2020 23:00) (94 - 123)  RR: 18 (13 Nov 2020 23:00) (16 - 20)  SpO2: 98% (13 Nov 2020 23:00) (98% - 100%)      I&O's Summary    13 Nov 2020 07:01  -  13 Nov 2020 23:41  --------------------------------------------------------  IN: 100 mL / OUT: 0 mL / NET: 100 mL      PHYSICAL EXAM:   General: No acute distress  Eyes: EOMI, PERRLA, conjunctiva and sclera clear  Chest/Lung: CTAB, no wheezes, rales, or rhonchi  Neck: +JVD.   Heart: Regular rate, regular rhythm. Normal S1/S2. No murmurs, rubs, or gallops.  Abdomen: Soft, nontender, nondistended. Normal bowel sounds.  Extremites: 2+ peripheral pulses B/L. No clubbing, cyanosis, or edema.  Neurology: A&O x3, no focal deficits  Skin: No rashes or lesions    ============================I/O===========================   I&O's Detail    13 Nov 2020 07:01  -  13 Nov 2020 23:41  --------------------------------------------------------  IN:    IV PiggyBack: 100 mL  Total IN: 100 mL    OUT:  Total OUT: 0 mL    Total NET: 100 mL        ============================ LABS =========================                        14.2   7.46  )-----------( 325      ( 13 Nov 2020 17:02 )             42.0     11-13    141  |  105  |  8   ----------------------------<  101<H>  3.7   |  22  |  0.61    Ca    9.8      13 Nov 2020 17:02  Phos  3.5     11-13  Mg     1.9     11-13    TPro  7.7  /  Alb  4.7  /  TBili  0.2  /  DBili  x   /  AST  18  /  ALT  17  /  AlkPhos  57  11-13                LIVER FUNCTIONS - ( 13 Nov 2020 17:02 )  Alb: 4.7 g/dL / Pro: 7.7 g/dL / ALK PHOS: 57 U/L / ALT: 17 U/L / AST: 18 U/L / GGT: x           PT/INR - ( 13 Nov 2020 17:02 )   PT: 12.8 sec;   INR: 1.07 ratio         PTT - ( 13 Nov 2020 17:02 )  PTT:27.8 sec        ======================Micro/Rad/Cardio=================  Telemetry: Reviewed   Echo: Reviewed  Cath: Reviewed  ======================================================  PAST MEDICAL & SURGICAL HISTORY:  No pertinent past medical history    No significant past surgical history      ====================ASSESSMENT ==============  Cardiac Tamponade  Normal Ventricular function    Plan:  ====================== NEUROLOGY=====================  A&Ox3  - Nonfocal, continue to monitor neuro status per ICU protocol.     ==================== RESPIRATORY======================  Comfortable on RA, SpO2 98%  - Cont to monitor SpO2 via pulse oximetry.     ====================CARDIOVASCULAR==================  Cardiac Tamponade  - TTE 11/3: Large pericardial effusion with R invagination and impaired RV filling and diastolic collapse consistent with cardiac tamponade, normal LV systolic function, no WMAs, normal RV size and function, normal pulmonary pressures   - Minimal space for adequate pericardial puncture per IC attending  - Repeat TTE 11/14 to assess for conservative vs. invasive management. Likely will need IR consult.   - Hemodyanmics stable at this period of time     ===================HEMATOLOGIC/ONC ===================  H/H 14.2/42 stable.   - Continue to monitor hemoglobin and hematocrit levels.    ===================== RENAL =========================  No acute issues  -Scr within normal limits   - Continue to monitor I/Os, BUN/Creatinine, and urine output.   -  Replete lytes PRN. Keep K> 4 and Mg >2.     ==================== GASTROINTESTINAL===================  Regular Diet    ergocalciferol 98233 Unit(s) Oral <User Schedule>    =======================    ENDOCRINE  =====================  no acute issues  - Monitor blood glucose level.     ========================INFECTIOUS DISEASE================  Afebrile, WBC within normal limits.  - Monitor for fever/leukocytosis     Patient requires continuous monitoring with bedside rhythm monitoring, pulse ox monitoring, and intermittent blood gas analysis. Care plan discussed with ICU care team. Patient remained critical and at risk for life threatening decompensation.  Patient seen, examined and plan discussed with CCU team during rounds.     I have personally provided 35 minutes of critical care time excluding time spent on separate procedures.    By signing my name below, I, Mary Nieto, attest that this documentation has been prepared under the direction and in the presence of PAVEL Caceres.   Electronically signed: Stacie Nguyễn, 11-13-20 @ 23:41    SHANIKA,  JONAS Caceres  , personally performed the services described in this documentation. all medical record entries made by the scribe were at my direction and in my presence. I have reviewed the chart and agree that the record reflects my personal performance and is accurate and complete  Electronically signed:  PAVEL Caceres.       ----------------------------FELLOW NOTE---------------------------------    Patient had evidence of tamponade on echo, however in the cath lab, the patient had no safe pocket for pericardiocentesis.   While in the CICU, the patient's SBP has been in the 110s, so patient did not require any fluids so far  Echo ordered for AM - if patient has a tappable pocket, then would consult IR for pericardiocentesis with drain  Will send rheumatologic workup overnight as patient has been complaining of MSK issues (aches, pains of thighs). No weight loss to be concerned for malignancy, and patient is up to date on her mammograms. No recent URI sxs.     Alanis Lara MD  Cardiology Fellow, PGY-4  [phone]  For all other Cardiology service contact information, go to [email or web address] and use "[facility]" to login.

## 2020-11-14 ENCOUNTER — TRANSCRIPTION ENCOUNTER (OUTPATIENT)
Age: 50
End: 2020-11-14

## 2020-11-14 VITALS
OXYGEN SATURATION: 100 % | HEART RATE: 111 BPM | DIASTOLIC BLOOD PRESSURE: 78 MMHG | SYSTOLIC BLOOD PRESSURE: 118 MMHG | RESPIRATION RATE: 18 BRPM

## 2020-11-14 LAB
A1C WITH ESTIMATED AVERAGE GLUCOSE RESULT: 5.7 % — HIGH (ref 4–5.6)
ALBUMIN SERPL ELPH-MCNC: 4.3 G/DL — SIGNIFICANT CHANGE UP (ref 3.3–5)
ALP SERPL-CCNC: 51 U/L — SIGNIFICANT CHANGE UP (ref 40–120)
ALT FLD-CCNC: 16 U/L — SIGNIFICANT CHANGE UP (ref 10–45)
ANION GAP SERPL CALC-SCNC: 11 MMOL/L — SIGNIFICANT CHANGE UP (ref 5–17)
ANTI-RIBONUCLEAR PROTEIN: <0.2 AI — SIGNIFICANT CHANGE UP
APTT BLD: 27 SEC — LOW (ref 27.5–35.5)
AST SERPL-CCNC: 16 U/L — SIGNIFICANT CHANGE UP (ref 10–40)
BASOPHILS # BLD AUTO: 0.05 K/UL — SIGNIFICANT CHANGE UP (ref 0–0.2)
BASOPHILS NFR BLD AUTO: 0.5 % — SIGNIFICANT CHANGE UP (ref 0–2)
BILIRUB SERPL-MCNC: 0.3 MG/DL — SIGNIFICANT CHANGE UP (ref 0.2–1.2)
BLD GP AB SCN SERPL QL: NEGATIVE — SIGNIFICANT CHANGE UP
BUN SERPL-MCNC: 9 MG/DL — SIGNIFICANT CHANGE UP (ref 7–23)
CALCIUM SERPL-MCNC: 9.3 MG/DL — SIGNIFICANT CHANGE UP (ref 8.4–10.5)
CHLORIDE SERPL-SCNC: 106 MMOL/L — SIGNIFICANT CHANGE UP (ref 96–108)
CHOLEST SERPL-MCNC: 167 MG/DL — SIGNIFICANT CHANGE UP
CK MB BLD-MCNC: 3.8 % — HIGH (ref 0–3.5)
CK MB CFR SERPL CALC: 4.1 NG/ML — HIGH (ref 0–3.8)
CK SERPL-CCNC: 107 U/L — SIGNIFICANT CHANGE UP (ref 25–170)
CO2 SERPL-SCNC: 24 MMOL/L — SIGNIFICANT CHANGE UP (ref 22–31)
CREAT SERPL-MCNC: 0.74 MG/DL — SIGNIFICANT CHANGE UP (ref 0.5–1.3)
CRP SERPL-MCNC: <0.06 MG/DL — SIGNIFICANT CHANGE UP (ref 0–0.4)
DSDNA AB FLD-ACNC: <0.2 AI — SIGNIFICANT CHANGE UP
ENA SM AB FLD QL: <0.2 AI — SIGNIFICANT CHANGE UP
ENA SS-A AB FLD IA-ACNC: <0.2 AI — SIGNIFICANT CHANGE UP
EOSINOPHIL # BLD AUTO: 0.13 K/UL — SIGNIFICANT CHANGE UP (ref 0–0.5)
EOSINOPHIL NFR BLD AUTO: 1.4 % — SIGNIFICANT CHANGE UP (ref 0–6)
ESTIMATED AVERAGE GLUCOSE: 117 MG/DL — HIGH (ref 68–114)
GLUCOSE SERPL-MCNC: 105 MG/DL — HIGH (ref 70–99)
HCG SERPL-ACNC: <2 MIU/ML — SIGNIFICANT CHANGE UP
HCT VFR BLD CALC: 41.2 % — SIGNIFICANT CHANGE UP (ref 34.5–45)
HDLC SERPL-MCNC: 45 MG/DL — LOW
HGB BLD-MCNC: 13.3 G/DL — SIGNIFICANT CHANGE UP (ref 11.5–15.5)
IMM GRANULOCYTES NFR BLD AUTO: 0.3 % — SIGNIFICANT CHANGE UP (ref 0–1.5)
INR BLD: 1.14 RATIO — SIGNIFICANT CHANGE UP (ref 0.88–1.16)
LIPID PNL WITH DIRECT LDL SERPL: 100 MG/DL — HIGH
LYMPHOCYTES # BLD AUTO: 2.31 K/UL — SIGNIFICANT CHANGE UP (ref 1–3.3)
LYMPHOCYTES # BLD AUTO: 24.7 % — SIGNIFICANT CHANGE UP (ref 13–44)
MAGNESIUM SERPL-MCNC: 2.2 MG/DL — SIGNIFICANT CHANGE UP (ref 1.6–2.6)
MCHC RBC-ENTMCNC: 29.1 PG — SIGNIFICANT CHANGE UP (ref 27–34)
MCHC RBC-ENTMCNC: 32.3 GM/DL — SIGNIFICANT CHANGE UP (ref 32–36)
MCV RBC AUTO: 90.2 FL — SIGNIFICANT CHANGE UP (ref 80–100)
MONOCYTES # BLD AUTO: 0.86 K/UL — SIGNIFICANT CHANGE UP (ref 0–0.9)
MONOCYTES NFR BLD AUTO: 9.2 % — SIGNIFICANT CHANGE UP (ref 2–14)
NEUTROPHILS # BLD AUTO: 5.99 K/UL — SIGNIFICANT CHANGE UP (ref 1.8–7.4)
NEUTROPHILS NFR BLD AUTO: 63.9 % — SIGNIFICANT CHANGE UP (ref 43–77)
NON HDL CHOLESTEROL: 122 MG/DL — SIGNIFICANT CHANGE UP
NRBC # BLD: 0 /100 WBCS — SIGNIFICANT CHANGE UP (ref 0–0)
PHOSPHATE SERPL-MCNC: 4 MG/DL — SIGNIFICANT CHANGE UP (ref 2.5–4.5)
PLATELET # BLD AUTO: 300 K/UL — SIGNIFICANT CHANGE UP (ref 150–400)
POTASSIUM SERPL-MCNC: 3.8 MMOL/L — SIGNIFICANT CHANGE UP (ref 3.5–5.3)
POTASSIUM SERPL-SCNC: 3.8 MMOL/L — SIGNIFICANT CHANGE UP (ref 3.5–5.3)
PROT SERPL-MCNC: 6.8 G/DL — SIGNIFICANT CHANGE UP (ref 6–8.3)
PROTHROM AB SERPL-ACNC: 13.6 SEC — SIGNIFICANT CHANGE UP (ref 10.6–13.6)
RBC # BLD: 4.57 M/UL — SIGNIFICANT CHANGE UP (ref 3.8–5.2)
RBC # FLD: 12.5 % — SIGNIFICANT CHANGE UP (ref 10.3–14.5)
RH IG SCN BLD-IMP: NEGATIVE — SIGNIFICANT CHANGE UP
SODIUM SERPL-SCNC: 141 MMOL/L — SIGNIFICANT CHANGE UP (ref 135–145)
TRIGL SERPL-MCNC: 108 MG/DL — SIGNIFICANT CHANGE UP
TROPONIN T, HIGH SENSITIVITY RESULT: 7 NG/L — SIGNIFICANT CHANGE UP (ref 0–51)
TSH SERPL-MCNC: 2.18 UIU/ML — SIGNIFICANT CHANGE UP (ref 0.27–4.2)
WBC # BLD: 9.37 K/UL — SIGNIFICANT CHANGE UP (ref 3.8–10.5)
WBC # FLD AUTO: 9.37 K/UL — SIGNIFICANT CHANGE UP (ref 3.8–10.5)

## 2020-11-14 PROCEDURE — 86038 ANTINUCLEAR ANTIBODIES: CPT

## 2020-11-14 PROCEDURE — 93306 TTE W/DOPPLER COMPLETE: CPT | Mod: 26

## 2020-11-14 PROCEDURE — 85610 PROTHROMBIN TIME: CPT

## 2020-11-14 PROCEDURE — 85652 RBC SED RATE AUTOMATED: CPT

## 2020-11-14 PROCEDURE — 84484 ASSAY OF TROPONIN QUANT: CPT

## 2020-11-14 PROCEDURE — 71260 CT THORAX DX C+: CPT | Mod: 26

## 2020-11-14 PROCEDURE — 86901 BLOOD TYPING SEROLOGIC RH(D): CPT

## 2020-11-14 PROCEDURE — 93306 TTE W/DOPPLER COMPLETE: CPT

## 2020-11-14 PROCEDURE — 83735 ASSAY OF MAGNESIUM: CPT

## 2020-11-14 PROCEDURE — 83036 HEMOGLOBIN GLYCOSYLATED A1C: CPT

## 2020-11-14 PROCEDURE — 85025 COMPLETE CBC W/AUTO DIFF WBC: CPT

## 2020-11-14 PROCEDURE — 82553 CREATINE MB FRACTION: CPT

## 2020-11-14 PROCEDURE — 80053 COMPREHEN METABOLIC PANEL: CPT

## 2020-11-14 PROCEDURE — 93005 ELECTROCARDIOGRAM TRACING: CPT

## 2020-11-14 PROCEDURE — 74177 CT ABD & PELVIS W/CONTRAST: CPT | Mod: 26

## 2020-11-14 PROCEDURE — 74177 CT ABD & PELVIS W/CONTRAST: CPT

## 2020-11-14 PROCEDURE — 84100 ASSAY OF PHOSPHORUS: CPT

## 2020-11-14 PROCEDURE — 84443 ASSAY THYROID STIM HORMONE: CPT

## 2020-11-14 PROCEDURE — 71260 CT THORAX DX C+: CPT

## 2020-11-14 PROCEDURE — 87635 SARS-COV-2 COVID-19 AMP PRB: CPT

## 2020-11-14 PROCEDURE — C1769: CPT

## 2020-11-14 PROCEDURE — 86769 SARS-COV-2 COVID-19 ANTIBODY: CPT

## 2020-11-14 PROCEDURE — 86431 RHEUMATOID FACTOR QUANT: CPT

## 2020-11-14 PROCEDURE — 82787 IGG 1 2 3 OR 4 EACH: CPT

## 2020-11-14 PROCEDURE — 84702 CHORIONIC GONADOTROPIN TEST: CPT

## 2020-11-14 PROCEDURE — 80061 LIPID PANEL: CPT

## 2020-11-14 PROCEDURE — 86235 NUCLEAR ANTIGEN ANTIBODY: CPT

## 2020-11-14 PROCEDURE — C1729: CPT

## 2020-11-14 PROCEDURE — 99285 EMERGENCY DEPT VISIT HI MDM: CPT

## 2020-11-14 PROCEDURE — 86140 C-REACTIVE PROTEIN: CPT

## 2020-11-14 PROCEDURE — 85730 THROMBOPLASTIN TIME PARTIAL: CPT

## 2020-11-14 PROCEDURE — 82550 ASSAY OF CK (CPK): CPT

## 2020-11-14 PROCEDURE — 86900 BLOOD TYPING SEROLOGIC ABO: CPT

## 2020-11-14 PROCEDURE — 86850 RBC ANTIBODY SCREEN: CPT

## 2020-11-14 PROCEDURE — 99233 SBSQ HOSP IP/OBS HIGH 50: CPT

## 2020-11-14 RX ORDER — COLCHICINE 0.6 MG
0.6 TABLET ORAL EVERY 12 HOURS
Refills: 0 | Status: DISCONTINUED | OUTPATIENT
Start: 2020-11-14 | End: 2020-11-14

## 2020-11-14 RX ORDER — POTASSIUM CHLORIDE 20 MEQ
20 PACKET (EA) ORAL ONCE
Refills: 0 | Status: COMPLETED | OUTPATIENT
Start: 2020-11-14 | End: 2020-11-14

## 2020-11-14 RX ORDER — COLCHICINE 0.6 MG
1 TABLET ORAL
Qty: 60 | Refills: 0
Start: 2020-11-14

## 2020-11-14 RX ADMIN — Medication 20 MILLIEQUIVALENT(S): at 06:27

## 2020-11-14 RX ADMIN — Medication 0.6 MILLIGRAM(S): at 18:00

## 2020-11-14 NOTE — PROGRESS NOTE ADULT - ATTENDING COMMENTS
No tamponade physiology seen on echo today.  Maintain good hydration and PO intake.  Transfer to floor. She may benefit from anti-inflammatory, given idiopathic nature of pericardial effusion.  D/c planning. She has been advised to get event monitoring to diagnose cause of infrequent palpitations.

## 2020-11-14 NOTE — PROGRESS NOTE ADULT - ASSESSMENT
49 yo F w/ hx of palpitations who p/w pericardial effusion without e/o clinical tamponade    - CT scan shows no malignancy  - Rheumatology labs pending and pt has an appointment with a Rheumatologst for complaints of LE pain  - No e/o viral illness CRP beg  - CT scan shows small to mod effusion, ECHO now does not show tamponade. Pt ambulated multiple times without compromise to hemodynamics  - Colchicine .6 BID, follow up with Dr. Dumont within one week  - Pt will obtain event monitor from Dr. Dumont for complaints of palpitations. On tele pt has been SR 90s  - DC Home today

## 2020-11-14 NOTE — DISCHARGE NOTE PROVIDER - NSDCCPCAREPLAN_GEN_ALL_CORE_FT
PRINCIPAL DISCHARGE DIAGNOSIS  Diagnosis: Pericardial effusion  Assessment and Plan of Treatment: - Call 911if you develop a fever, feel lightheaded, swelling in your feet, you have shortness of breath, or chest pain.   - Take your medications as directed.  - Follow up with your cardiologist within one week.

## 2020-11-14 NOTE — DISCHARGE NOTE PROVIDER - NSDCFUSCHEDAPPT_GEN_ALL_CORE_FT
KESHA FIGUEROA ; 12/03/2020 ; NPP Med Rheum [address] [facility] [Follow-Up: _____] : a [unfilled] follow-up visit [FreeTextEntry1] : autoimmune thyroid disease, hypothyroid, post partum thyroiditis

## 2020-11-14 NOTE — DISCHARGE NOTE PROVIDER - CARE PROVIDER_API CALL
Erica Mittal)  Cardiology; Internal Medicine  [address] [place] [address]  Phone: [phone]  Fax: [phone]  Follow Up Time:

## 2020-11-14 NOTE — DISCHARGE NOTE PROVIDER - HOSPITAL COURSE
This is a 51 y/o F w/ no significant PMH presenting w/ abnormal echo showing pericardial effusion sent from [facility] US lab. Pt reports intermittent palpitations and squeezing sensation in her chest for many years. States that they occur about 2 times a month. Symptoms worsened over the summer which prompted her to f/u w/ cardiologist Dr. Mittal. Pt reports that 1 week ago, she woke up with palpitations and a chest squeezing sensation that resolved after 24 hours. Denies any pain, exertional, or pleuritic components. Dr. Mittal scheduled Echo for today and pt was told to go directly to the ED after it was performed. No hx of prior pericardial effusions. No recent viral illnesses. Had stress echo done in 2017 due to palpitations which was normal. Of note, had a left dental implant done a week ago on 11/4 and completed a 7 day course of amoxicillin. Also has chronic joint stiffness and pain, mostly in the upper thighs, for which she is planning to be seen by Rheumatology.     Denies fevers, chills, headache, dizziness, blurry vision, cough, shortness of breath, abdominal pain, n/v/d/c, urinary symptoms, rash, sick contact, sore throat.    Pt's vitals were T 98.7  /80 RR 18 POX 98% RA. Pt taken to cath labs but there was no pocket to do pericardiocentesis, pt transferred to CCU. In the CCU a repeat ECHO showed no evidence of tamponade. CT scan was done that ruled out malignancy and showed small-mod pericardial effusion. Pt ambulated without any symptoms. Pt discharged home on colchicine with plan to follow-up with Dr. Mittal in one week to monitor effusion size as well as for an event monitor due to complaints of rapid heart rate.     Pt in agreement with plan.

## 2020-11-14 NOTE — PROGRESS NOTE ADULT - SUBJECTIVE AND OBJECTIVE BOX
Events:    Review Of Systems:  Constitutional: denies fever, chills, Fatigue   HEENT: denies Blurred vision, Eye Pain, Headache   Respiratory: denies Cough, Wheezing , Shortness of breath  Cardiovascular: denies Chest Pain, Palpitations,  RUVALCABA   Gastrointestinal: denies Abdominal Pain, Diarrhea, Constipation   Genitourinary: denies Nocturia, Dysuria, Incontinence  Extremities: denies Swelling, Joint Pain  Neurologic: denies Focal deficit, Paresthesias, Syncope  Lymphatic: denies Swelling, Lymphadenopathy   Skin: denies Rash, Ecchymoses, Wounds   Psychiatry: denies Depression, Suicidal/Homicidal Ideation, anxiety  [X ] 10 point review of systems is otherwise negative except as mentioned above         Medications:  chlorhexidine 4% Liquid 1 Application(s) Topical <User Schedule>  ergocalciferol 69889 Unit(s) Oral <User Schedule>    PMH/PSH/FH/SH: [ ] Unchanged  Vitals:  T(C): 36.9 (20 @ 04:00), Max: 37.1 (20 @ 19:40)  HR: 82 (20 @ 07:00) (74 - 100)  BP: 110/60 (20 @ 07:00) (89/54 - 153/89)  BP(mean): 75 (20 @ 07:00) (66 - 123)  RR: 18 (20 @ 07:00) (15 - 20)  SpO2: 98% (20 @ 07:00) (96% - 100%)  Wt(kg): --  Daily Height in cm: 167.64 (2020 16:03)    Daily Weight in k.3 (2020 19:40)  I&O's Summary    2020 07:01  -  2020 07:00  --------------------------------------------------------  IN: 100 mL / OUT: 0 mL / NET: 100 mL        Physical Exam:  Appearance: [ ] Normal [ ] NAD  Eyes: [ ] PERRL [ ] EOMI  HENT: [ ] Normal oral muscosa [ ]NC/AT  Cardiovascular: [ ] S1 [ ] S2 [ ] RRR [ ] No m/r/g [ ]No edema [ ] JVP  Procedural Access Site: [ ] No hematoma [ ] Non-tender to palpation [ ] 2+ pulse [ ] No bruit [ ] No Ecchymosis  Respiratory: [ ] Clear to auscultation bilaterally  Gastrointestinal: [ ] Soft [ ] Non-tender [ ] Non-distended [ ] BS+  Musculoskeletal: [ ] No clubbing [ ] No joint deformity   Neurologic: [ ] Non-focal  Lymphatic: [ ] No lymphadenopathy  Psychiatry: [ ] AAOx3 [ ] Mood & affect appropriate  Skin: [ ] No rashes [ ] No ecchymoses [ ] No cyanosis        141  |  106  |  9   ----------------------------<  105<H>  3.8   |  24  |  0.74    Ca    9.3      2020 01:25  Phos  4.0     11-14  Mg     2.2     -14    TPro  6.8  /  Alb  4.3  /  TBili  0.3  /  DBili  x   /  AST  16  /  ALT  16  /  AlkPhos  51  11-14    PT/INR - ( 2020 01:25 )   PT: 13.6 sec;   INR: 1.14 ratio         PTT - ( 2020 01:25 )  PTT:27.0 sec  CARDIAC MARKERS ( 2020 01:25 )  x     / x     / 107 U/L / x     / 4.1 ng/mL              ECG:    Echo:    Stress Testing:     Cath:    Imaging:    Interpretation of Telemetry:   HPI:  This is a 51 y/o F w/ no significant PMH presenting w/ abnormal echo showing pericardial effusion sent from [facility] lab. Pt reports intermittent palpitations and squeezing sensation in her chest for many years. States that they occur about 2 times a month. Symptoms worsened over the summer which prompted her to f/u w/ cardiologist Dr. Mittal. Pt reports that 1 week ago, she woke up with palpitations and a chest squeezing sensation that resolved after 24 hours. Denies any pain, exertional, or pleuritic components. Dr. Mittal scheduled Echo for today and pt was told to go directly to the ED after it was performed. No hx of prior pericardial effusions. No recent viral illnesses. Had stress echo done in 2017 due to palpitations which was normal. Of note, had a left dental implant done a week ago on  and completed a 7 day course of amoxicillin. Also has chronic joint stiffness and pain, mostly in the upper thighs, for which she is planning to be seen by Rheumatology.     Denies fevers, chills, headache, dizziness, blurry vision, cough, shortness of breath, abdominal pain, n/v/d/c, urinary symptoms, rash, sick contact, sore throat.    Pt's vitals were T 98.7  /80 RR 18 POX 98% RA. Pt taken to cath labs but there was no pocket to do pericardiocentesis, pt transferred to CCU for further monitoring.  (2020 20:43)    Events: No acute events. Plan for CT scan to rule out malignancy    Review Of Systems:  Constitutional: denies fever, chills, Fatigue   HEENT: denies Blurred vision, Eye Pain, Headache   Respiratory: denies Cough, Wheezing , Shortness of breath  Cardiovascular: denies Chest Pain, Palpitations,  RUVALCABA   Gastrointestinal: denies Abdominal Pain, Diarrhea, Constipation   Genitourinary: denies Nocturia, Dysuria, Incontinence  Extremities: denies Swelling, Joint Pain  Neurologic: denies Focal deficit, Paresthesias, Syncope  Lymphatic: denies Swelling, Lymphadenopathy   Skin: denies Rash, Ecchymoses, Wounds   Psychiatry: denies Depression, Suicidal/Homicidal Ideation, anxiety  [X ] 10 point review of systems is otherwise negative except as mentioned above         Medications:  chlorhexidine 4% Liquid 1 Application(s) Topical <User Schedule>  colchicine 0.6 milliGRAM(s) Oral every 12 hours  ergocalciferol 35050 Unit(s) Oral <User Schedule>    Vitals:  T(C): 36.9 (20 @ 04:00), Max: 37.1 (20 @ 19:40)  HR: 94 (20 @ 15:00) (74 - 100)  BP: 127/71 (20 @ 15:00) (89/54 - 147/76)  BP(mean): 97 (20 @ 15:00) (66 - 123)  RR: 18 (20 @ 15:00) (15 - 18)  SpO2: 100% (20 @ 15:00) (96% - 100%)    Daily     Daily Weight in k.3 (2020 19:40)  I&O's Summary    2020 07:01  -  2020 07:00  --------------------------------------------------------  IN: 100 mL / OUT: 0 mL / NET: 100 mL    Physical Exam:  Appearance: [X ] Normal [X ] NAD  Eyes: [X ] PERRL [X ] EOMI  HENT: [ X] Normal oral muscosa [X ]NC/AT  Cardiovascular: [ X] S1 [ X] S2 [ X] RRR. No JVD  Respiratory: [X ] Clear to auscultation bilaterally  Gastrointestinal: [X ] Soft [X ] Non-tender [X ] Non-distended   Musculoskeletal: [X ] No clubbing [ X] No joint deformity   Neurologic: [ X] Non-focal  Lymphatic: [ X] No lymphadenopathy  Psychiatry: [X ] AAOx3 [ X] Mood & affect appropriate  Skin: [ X] No rashes [X ] No ecchymoses [ X] No cyanosis        141  |  106  |  9   ----------------------------<  105<H>  3.8   |  24  |  0.74    Ca    9.3      2020 01:25  Phos  4.0     11-14  Mg     2.2     -14    TPro  6.8  /  Alb  4.3  /  TBili  0.3  /  DBili  x   /  AST  16  /  ALT  16  /  AlkPhos  51  11-    PT/INR - ( 2020 01:25 )   PT: 13.6 sec;   INR: 1.14 ratio         PTT - ( 2020 01:25 )  PTT:27.0 sec  CARDIAC MARKERS ( 2020 01:25 )  x     / x     / 107 U/L / x     / 4.1 ng/mL    Total Cholesterol: 167  LDL: --  HDL: 45  T    ECG: < from: 12 Lead ECG (20 @ 19:39) >  INUS TACHYCARDIA  RIGHTWARD AXIS  LOW VOLTAGE QRS  CANNOT RULE OUT ANTERIOR INFARCT , AGE UNDETERMINED    Echo: < from: Transthoracic Echocardiogram (20 @ 10:09) >  1. Normal left ventricular internal dimensions and wall  thicknesses.  2. Normal left ventricular systolic function. No segmental  wall motion abnormalities.  3. Pericardial effusion measuring 1.3 cm posterior to the  left ventricle.  The effusion measures 1.5 cm lateral to  the left ventricle.   The pericardial effusion measures  0.75 cm adjacent to the right ventricle as seen in the  subcostal view.  With deep breath the effusion measures 1.3  cm.  It measures 1.7 cm at the RV apex.   There is 19%  transmitral respirophasic flow variation across the mitral  valve.  There is mild diastolic invagination of the right  atrial and right ventricular free wall.  *** Compared withechocardiogram of 2020, less  respirophasic flow variation is noted.  The RA and RV  appear less compressed.  Clinical correlation required.    Imaging: < from: CT Abdomen and Pelvis w/ IV Cont (20 @ 13:56) >  IMPRESSION:    Small to moderate pericardial effusion.    No evidence of malignancy in the chest, abdomen or pelvis.    Interpretation of Telemetry:  SR 90s

## 2020-11-14 NOTE — DISCHARGE NOTE NURSING/CASE MANAGEMENT/SOCIAL WORK - PATIENT PORTAL LINK FT
You can access the FollowMyHealth Patient Portal offered by [facility] by registering at the following website: [email or web address]. By joining [employer]’s FollowMyHealth portal, you will also be able to view your health information using other applications (apps) compatible with our system.

## 2020-11-14 NOTE — DISCHARGE NOTE PROVIDER - NSDCMRMEDTOKEN_GEN_ALL_CORE_FT
colchicine 0.6 mg oral tablet: 1 tab(s) orally every 12 hours  Vitamin D3: 33811 unit(s) orally once a week

## 2020-11-15 LAB — RHEUMATOID FACT SERPL-ACNC: <10 IU/ML — SIGNIFICANT CHANGE UP (ref 0–13)

## 2020-11-16 ENCOUNTER — NON-APPOINTMENT (OUTPATIENT)
Age: 50
End: 2020-11-16

## 2020-11-16 ENCOUNTER — APPOINTMENT (OUTPATIENT)
Dept: CARDIOLOGY | Facility: CLINIC | Age: 50
End: 2020-11-16
Payer: COMMERCIAL

## 2020-11-16 ENCOUNTER — APPOINTMENT (OUTPATIENT)
Dept: ELECTROPHYSIOLOGY | Facility: CLINIC | Age: 50
End: 2020-11-16

## 2020-11-16 VITALS
WEIGHT: 156 LBS | OXYGEN SATURATION: 100 % | DIASTOLIC BLOOD PRESSURE: 99 MMHG | HEIGHT: 66 IN | HEART RATE: 96 BPM | BODY MASS INDEX: 25.07 KG/M2 | SYSTOLIC BLOOD PRESSURE: 141 MMHG

## 2020-11-16 VITALS — SYSTOLIC BLOOD PRESSURE: 124 MMHG | DIASTOLIC BLOOD PRESSURE: 84 MMHG

## 2020-11-16 LAB
SARS-COV-2 IGG SERPL QL IA: NEGATIVE — SIGNIFICANT CHANGE UP
SARS-COV-2 IGM SERPL IA-ACNC: <0.1 INDEX — SIGNIFICANT CHANGE UP

## 2020-11-16 PROCEDURE — 99072 ADDL SUPL MATRL&STAF TM PHE: CPT

## 2020-11-16 PROCEDURE — 93000 ELECTROCARDIOGRAM COMPLETE: CPT

## 2020-11-16 PROCEDURE — 99214 OFFICE O/P EST MOD 30 MIN: CPT

## 2020-11-16 NOTE — REASON FOR VISIT
[Follow-Up - Clinic] : a clinic follow-up of [FreeTextEntry2] : palpoitaitons, pericardia effusion, pericarditis

## 2020-11-16 NOTE — REVIEW OF SYSTEMS
[Eyeglasses] : currently wearing eyeglasses [Shortness Of Breath] : shortness of breath [Palpitations] : palpitations [Negative] : Neurological

## 2020-11-16 NOTE — PHYSICAL EXAM
[General Appearance - Well Developed] : well developed [Normal Appearance] : normal appearance [Well Groomed] : well groomed [General Appearance - Well Nourished] : well nourished [No Deformities] : no deformities [General Appearance - In No Acute Distress] : no acute distress [Normal Conjunctiva] : the conjunctiva exhibited no abnormalities [Eyelids - No Xanthelasma] : the eyelids demonstrated no xanthelasmas [Normal Oral Mucosa] : normal oral mucosa [No Oral Pallor] : no oral pallor [No Oral Cyanosis] : no oral cyanosis [Normal Jugular Venous A Waves Present] : normal jugular venous A waves present [Normal Jugular Venous V Waves Present] : normal jugular venous V waves present [No Jugular Venous Richardson A Waves] : no jugular venous richardson A waves [Respiration, Rhythm And Depth] : normal respiratory rhythm and effort [Exaggerated Use Of Accessory Muscles For Inspiration] : no accessory muscle use [Auscultation Breath Sounds / Voice Sounds] : lungs were clear to auscultation bilaterally [Abdomen Soft] : soft [Abdomen Tenderness] : non-tender [Abdomen Mass (___ Cm)] : no abdominal mass palpated [Abnormal Walk] : normal gait [Gait - Sufficient For Exercise Testing] : the gait was sufficient for exercise testing [Nail Clubbing] : no clubbing of the fingernails [Cyanosis, Localized] : no localized cyanosis [Petechial Hemorrhages (___cm)] : no petechial hemorrhages [Skin Color & Pigmentation] : normal skin color and pigmentation [] : no rash [No Venous Stasis] : no venous stasis [Skin Lesions] : no skin lesions [No Skin Ulcers] : no skin ulcer [No Xanthoma] : no  xanthoma was observed [Oriented To Time, Place, And Person] : oriented to person, place, and time [Affect] : the affect was normal [Mood] : the mood was normal [No Anxiety] : not feeling anxious [Normal] : normal [Normal Rate] : normal [Rhythm Regular] : regular [Normal S1] : normal S1 [Normal S2] : normal S2 [2+] : left 2+ [No Pitting Edema] : no pitting edema present [Rt] : no varicose veins of the right leg [Lt] : no varicose veins of the left leg

## 2020-11-16 NOTE — HISTORY OF PRESENT ILLNESS
[FreeTextEntry1] : 50 year old odalis menopausal female with history of intermittent symptomatic palpitations that occur 1-2 times per month.\par \par She was last seen in the office in June of 2107 and was lost to follow up.. \par \par Palpitations are abrupt in onset that is not triggered by exertion or emotional distress. Of note, patient drinks little to no caffeine daily.\par \par This arrhythmia presents without provocation and can last between minutes to several hours in duration.\par Arrhythmia provokes the urge to cough, however,  patient has not tried to self terminate this heart rhythm with bearing down or vigorous coughing. \par \par She has an apple watch , which records heart rate 24 hours per day and she has graph results with sudden onset of rapid heart rate ranging in the high 100's to 120 bpm- as high as 160 bpm. \par \par Patient has undergone a stress echo in June of 2017. Testing revealed: \par Normal LV function\par LVEF 67%\par 11 METS of activity which was excellent for her age and gender. \par \par She returns today with an extensive record of her heart rate events. Most recent elevated heart rate at rest was on September 29, 2020.\par Max heart rate at 208 bpm recorded in August\par \par These elevated rates occur at most twice per month. Lasting seconds to a few minutes.\par She denies any dizziness, chest pain or shortness of breath.\par \par Patient presents today for a cardiac reevaluation. \par \par Interval Note\par November 16, 2020\par \par Patient's history is as noted above. She reports that she presented to the office for an echocardiogram \par on Friday, November 13, 2020. \par Echo demonstrated:  a large pericardial effusion: 1.3 cm posterior, 1.4 cm lateral, 2.15 cm at the RV free wall and apex. These findings were consistent with a cardiac tamponade. \par \par She was urgently taken to the cardiac cath lab for a pericardicentesis. However,  fluoroscopy was not consistent with the echocardiographic findings and the procedure was aborted. Patient was admitted overnight for evaluation.\par Inflammatory markers were drawn and a full CT scan with IV contrast was performed on her chest and abdomen. No underlying cause for pericardial effusion was identified. Patient was discharged and requested to have this cardiac follow up. \par \par On the previous week, November 6, 2020, patient had called to report palpitations and a labile pulse rate.\par She called and the decision was to place a Zio patch after she completed her echocardiogram. Since she went to the Cath lab, patch was never placed. \par \par To-date, patient has no further complaints of labile pulse, shortness of breath or chest discomfort.\par \par She has been placed on oral Colchicine .6 BID for treatment of pericarditis. \par \par Of note, patient underwent a dental post implant on  November 4, 2020.\par Additionally, she reported that she had a rash-like reaction to her flu shot on October 27 2020. \par \par \par \par \par \par \par \par

## 2020-11-16 NOTE — ASSESSMENT
[FreeTextEntry1] : 50 year old female presented  to the office a few weeks ago with complaints of episodes of rapid HR seen on Apple watch.\par  Work up in past had been nl ( except prolonged monitoring was not done). She saw on new internist and then came 11/13 for echo. Hernán Mendez and Leland reported large effusion . She was admitted to ED , transferred to cath lab for drainage and was found o have less fluid ( not large enough to tap). She was admitted overnight for observation and d/patricia on colchicine.\par \par Returns today feeling well - interestingly no return of rapid heart rate.\par \par Will continue on colchicine for now. She does report significant diarrhea on the medication. Will monitor side effects.\par ESR and and_CRP to be sent today. Scheduled for rheum visit.\par \par Will follow up next Monday, November 19, 2020 along with repeat echo.

## 2020-11-17 LAB
CRP SERPL-MCNC: <0.1 MG/DL
ERYTHROCYTE [SEDIMENTATION RATE] IN BLOOD BY WESTERGREN METHOD: 9 MM/HR

## 2020-11-18 ENCOUNTER — NON-APPOINTMENT (OUTPATIENT)
Age: 50
End: 2020-11-18

## 2020-11-18 LAB — ANA TITR SER: NEGATIVE — SIGNIFICANT CHANGE UP

## 2020-11-20 ENCOUNTER — APPOINTMENT (OUTPATIENT)
Dept: CARDIOLOGY | Facility: CLINIC | Age: 50
End: 2020-11-20
Payer: COMMERCIAL

## 2020-11-20 ENCOUNTER — INPATIENT (INPATIENT)
Facility: HOSPITAL | Age: 50
LOS: 3 days | Discharge: HOME | DRG: 316 | End: 2020-11-24
Attending: INTERNAL MEDICINE | Admitting: HOSPITALIST
Payer: COMMERCIAL

## 2020-11-20 VITALS — TEMPERATURE: 97.8 F | SYSTOLIC BLOOD PRESSURE: 162 MMHG | DIASTOLIC BLOOD PRESSURE: 91 MMHG

## 2020-11-20 VITALS
HEART RATE: 86 BPM | HEIGHT: 66 IN | DIASTOLIC BLOOD PRESSURE: 98 MMHG | OXYGEN SATURATION: 98 % | WEIGHT: 156.97 LBS | RESPIRATION RATE: 18 BRPM | TEMPERATURE: 99 F | SYSTOLIC BLOOD PRESSURE: 147 MMHG

## 2020-11-20 VITALS — HEART RATE: 99 BPM

## 2020-11-20 DIAGNOSIS — Z29.9 ENCOUNTER FOR PROPHYLACTIC MEASURES, UNSPECIFIED: ICD-10-CM

## 2020-11-20 DIAGNOSIS — Z90.49 ACQUIRED ABSENCE OF OTHER SPECIFIED PARTS OF DIGESTIVE TRACT: Chronic | ICD-10-CM

## 2020-11-20 DIAGNOSIS — I31.3 PERICARDIAL EFFUSION (NONINFLAMMATORY): ICD-10-CM

## 2020-11-20 DIAGNOSIS — M25.60 STIFFNESS OF UNSPECIFIED JOINT, NOT ELSEWHERE CLASSIFIED: ICD-10-CM

## 2020-11-20 LAB
ALBUMIN SERPL ELPH-MCNC: 4.8 G/DL — SIGNIFICANT CHANGE UP (ref 3.3–5)
ALP SERPL-CCNC: 60 U/L — SIGNIFICANT CHANGE UP (ref 40–120)
ALT FLD-CCNC: 20 U/L — SIGNIFICANT CHANGE UP (ref 10–45)
ANION GAP SERPL CALC-SCNC: 16 MMOL/L — SIGNIFICANT CHANGE UP (ref 5–17)
APTT BLD: 27.2 SEC — LOW (ref 27.5–35.5)
AST SERPL-CCNC: 21 U/L — SIGNIFICANT CHANGE UP (ref 10–40)
BILIRUB SERPL-MCNC: 0.5 MG/DL — SIGNIFICANT CHANGE UP (ref 0.2–1.2)
BUN SERPL-MCNC: 8 MG/DL — SIGNIFICANT CHANGE UP (ref 7–23)
CALCIUM SERPL-MCNC: 10.5 MG/DL — SIGNIFICANT CHANGE UP (ref 8.4–10.5)
CHLORIDE SERPL-SCNC: 107 MMOL/L — SIGNIFICANT CHANGE UP (ref 96–108)
CO2 SERPL-SCNC: 23 MMOL/L — SIGNIFICANT CHANGE UP (ref 22–31)
CREAT SERPL-MCNC: 0.65 MG/DL — SIGNIFICANT CHANGE UP (ref 0.5–1.3)
GLUCOSE SERPL-MCNC: 109 MG/DL — HIGH (ref 70–99)
HCG SERPL-ACNC: <2 MIU/ML — SIGNIFICANT CHANGE UP
HCT VFR BLD CALC: 43.1 % — SIGNIFICANT CHANGE UP (ref 34.5–45)
HGB BLD-MCNC: 14.6 G/DL — SIGNIFICANT CHANGE UP (ref 11.5–15.5)
INR BLD: 1.08 RATIO — SIGNIFICANT CHANGE UP (ref 0.88–1.16)
MAGNESIUM SERPL-MCNC: 2.1 MG/DL — SIGNIFICANT CHANGE UP (ref 1.6–2.6)
MCHC RBC-ENTMCNC: 29.7 PG — SIGNIFICANT CHANGE UP (ref 27–34)
MCHC RBC-ENTMCNC: 33.9 GM/DL — SIGNIFICANT CHANGE UP (ref 32–36)
MCV RBC AUTO: 87.8 FL — SIGNIFICANT CHANGE UP (ref 80–100)
NRBC # BLD: 0 /100 WBCS — SIGNIFICANT CHANGE UP (ref 0–0)
PHOSPHATE SERPL-MCNC: 3.8 MG/DL — SIGNIFICANT CHANGE UP (ref 2.5–4.5)
PLATELET # BLD AUTO: 329 K/UL — SIGNIFICANT CHANGE UP (ref 150–400)
POTASSIUM SERPL-MCNC: 5.3 MMOL/L — SIGNIFICANT CHANGE UP (ref 3.5–5.3)
POTASSIUM SERPL-SCNC: 5.3 MMOL/L — SIGNIFICANT CHANGE UP (ref 3.5–5.3)
PROT SERPL-MCNC: 7.4 G/DL — SIGNIFICANT CHANGE UP (ref 6–8.3)
PROTHROM AB SERPL-ACNC: 12.9 SEC — SIGNIFICANT CHANGE UP (ref 10.6–13.6)
RBC # BLD: 4.91 M/UL — SIGNIFICANT CHANGE UP (ref 3.8–5.2)
RBC # FLD: 12.5 % — SIGNIFICANT CHANGE UP (ref 10.3–14.5)
SARS-COV-2 RNA SPEC QL NAA+PROBE: SIGNIFICANT CHANGE UP
SODIUM SERPL-SCNC: 146 MMOL/L — HIGH (ref 135–145)
TROPONIN T, HIGH SENSITIVITY RESULT: 15 NG/L — SIGNIFICANT CHANGE UP (ref 0–51)
WBC # BLD: 8.13 K/UL — SIGNIFICANT CHANGE UP (ref 3.8–10.5)
WBC # FLD AUTO: 8.13 K/UL — SIGNIFICANT CHANGE UP (ref 3.8–10.5)

## 2020-11-20 PROCEDURE — 93306 TTE W/DOPPLER COMPLETE: CPT

## 2020-11-20 PROCEDURE — 71250 CT THORAX DX C-: CPT | Mod: 26

## 2020-11-20 PROCEDURE — 99223 1ST HOSP IP/OBS HIGH 75: CPT | Mod: GC

## 2020-11-20 PROCEDURE — 93010 ELECTROCARDIOGRAM REPORT: CPT

## 2020-11-20 PROCEDURE — 99285 EMERGENCY DEPT VISIT HI MDM: CPT

## 2020-11-20 RX ORDER — COLCHICINE 0.6 MG
0.6 TABLET ORAL ONCE
Refills: 0 | Status: COMPLETED | OUTPATIENT
Start: 2020-11-20 | End: 2020-11-20

## 2020-11-20 RX ORDER — ERGOCALCIFEROL 1.25 MG/1
1 CAPSULE ORAL
Qty: 0 | Refills: 0 | DISCHARGE

## 2020-11-20 RX ORDER — CHOLECALCIFEROL (VITAMIN D3) 125 MCG
50000 CAPSULE ORAL
Qty: 0 | Refills: 0 | DISCHARGE

## 2020-11-20 RX ORDER — COLCHICINE 0.6 MG
0.6 TABLET ORAL EVERY 12 HOURS
Refills: 0 | Status: DISCONTINUED | OUTPATIENT
Start: 2020-11-20 | End: 2020-11-23

## 2020-11-20 RX ADMIN — Medication 0.6 MILLIGRAM(S): at 18:49

## 2020-11-20 NOTE — ED PROVIDER NOTE - CLINICAL SUMMARY MEDICAL DECISION MAKING FREE TEXT BOX
50F w/ recent CCU admission for pericardial effusion (11/13-14) of unclear etiology, now on colchicine, was sent in by outpatient cardiologist for increased pericardial effusion on repeat ECHO today, concerning for tamponade.   Cardiology fellow following. Per discussion, patient will go for IR drainage likely Monday. Will order pre-procedure labs and COVID. Give Colchicine at the meantime. CT chest non-con pre-procedure to evaluate for anatomy and severity of pericardial effusion. Will admit to Tele under medicine, and cardiology will follow.   Patient is currently VSS and asymptomatic.   If (+) decompensation, will call IR for emergent drainage.

## 2020-11-20 NOTE — ED ADULT NURSE NOTE - OBJECTIVE STATEMENT
Patient is a 50 yr old female who presents to the ED from home due to "fluid around the heart." Patient states she had an echo done a week ago which showed fluid around the heart and she was given meds which has given her diarrhea. Patient states she went to her PMD today for a check up and they wanted her to get an ECHO. Patient states an echo can't be scheduled outpatient soon enough so was told to come to ED. Upon assessment, patient is AOx4, afebrile, lung sounds clear upon ausculation, tachy on CM, abdomen soft/non tender/+bowel sounds all 4 quadrants, skin intact, +pulses and denies n/v/f/chills/SOB/CP/headache/cough/covid contact. Awaiting provider to assess at bedside, all safety precautions maintained, called bell at bedside, and will continue to monitor.

## 2020-11-20 NOTE — H&P ADULT - PROBLEM SELECTOR PLAN 2
Per history, likely RA but negative rheumatologic labs on previous admission   - Outpt f/u with rheumatology, referral given by PCP Dr. Sharyn Spaulding on 10/27/20 Per history, possible RA but negative rheumatologic labs on previous admission   - Outpt f/u with rheumatology, referral given by PCP Dr. Sharyn Spaulding on 10/27/20

## 2020-11-20 NOTE — H&P ADULT - NSHPPHYSICALEXAM_GEN_ALL_CORE
Vital Signs Last 24 Hrs  T(C): 36.9 (20 Nov 2020 21:00), Max: 37.2 (20 Nov 2020 17:20)  T(F): 98.4 (20 Nov 2020 21:00), Max: 99 (20 Nov 2020 17:20)  HR: 89 (20 Nov 2020 21:00) (86 - 110)  BP: 122/76 (20 Nov 2020 21:00) (122/76 - 147/98)  BP(mean): --  RR: 16 (20 Nov 2020 21:00) (16 - 20)  SpO2: 100% (20 Nov 2020 21:00) (98% - 100%)    CONSTITUTIONAL: No acute distress. Awake and alert.  HEAD: No evidence of trauma. Structures WNL.  EYES: +PERRL. +EOMI. No scleral icterus. No conjunctival injection.  ENT: Moist oral mucosa. No erythema. No pharyngeal exudates.   NECK: Supple. Appropriate ROM. No stiffness. No masses or lymphadenopathy.  RESPIRATORY: CTAB. No wheezes, rales, or rhonchi. No accessory muscle use. No apparent respiratory distress.  CARDIOVASCULAR: +S1/S2. No audible S3/S4. Regular rate and rhythm. No murmurs, rubs, or gallops. 2+ radial pulses x b/l UE; 2+ DP pulses x b/l LE.   GASTROINTESTINAL: Soft, nontender, nondistended. +BS. No rebound or guarding.   BACK: No spinal or paraspinal tenderness. No CVA tenderness.  EXTREMITY: No LE swelling or edema. EXTs warm to touch.  MUSCULOSKELETAL: Spontaneous movement in all extremities.  DERMATOLOGICAL: No abnormal rashes or lesions.  NEUROLOGICAL: CN 2-12 grossly intact. No focal deficits. Sensation intact x 4EXT. A&Ox3 (oriented to person, place, and time).  PSYCHIATRIC: Appropriate affect. Vital Signs Last 24 Hrs  T(C): 36.9 (20 Nov 2020 21:00), Max: 37.2 (20 Nov 2020 17:20)  T(F): 98.4 (20 Nov 2020 21:00), Max: 99 (20 Nov 2020 17:20)  HR: 89 (20 Nov 2020 21:00) (86 - 110)  BP: 122/76 (20 Nov 2020 21:00) (122/76 - 147/98)  BP(mean): --  RR: 16 (20 Nov 2020 21:00) (16 - 20)  SpO2: 100% (20 Nov 2020 21:00) (98% - 100%)    CONSTITUTIONAL: No acute distress. Awake and alert.  HEAD: No evidence of trauma. Structures WNL.  EYES: +PERRL. +EOMI. No scleral icterus. No conjunctival injection.  ENT: Moist oral mucosa. No erythema. No pharyngeal exudates.   NECK: Supple. Appropriate ROM. No stiffness. No masses or lymphadenopathy.   RESPIRATORY: CTAB. No wheezes, rales, or rhonchi. No accessory muscle use. No apparent respiratory distress.  CARDIOVASCULAR: +S1/S2. No audible S3/S4. Regular rate and rhythm. No murmurs, rubs, or gallops. 2+ radial pulses x b/l UE; 2+ DP pulses x b/l LE. No elevated JVD  GASTROINTESTINAL: Soft, nontender, nondistended. +BS. No rebound or guarding.   BACK: No spinal or paraspinal tenderness. No CVA tenderness.  EXTREMITY: No LE swelling or edema. EXTs warm to touch.  MUSCULOSKELETAL: Spontaneous movement in all extremities.  DERMATOLOGICAL: No abnormal rashes or lesions.  NEUROLOGICAL: CN 2-12 grossly intact. No focal deficits. Sensation intact x 4EXT. A&Ox3 (oriented to person, place, and time).  PSYCHIATRIC: Appropriate affect.

## 2020-11-20 NOTE — H&P ADULT - NSHPSOCIALHISTORY_GEN_ALL_CORE
, 2 kids    Former smoker (1/2 ppd for a few years), quit in 1980s (<5 py)  social alcohol use  no illicit drugs

## 2020-11-20 NOTE — PATIENT PROFILE ADULT - NUMBER OF YRS
[facility] now requests orders and shares plan of care/discharge summaries for some patients through [facility].  Please REPLY TO THIS MESSAGE OR ROUTE BACK TO THE AUTHOR in order to give authorization for orders when needed.  This is considered a verbal order, you will still receive a faxed copy of orders for signature.  Thank you for your assistance in improving collaboration for our patients.    ORDER request  Add orders for home health aide, 1w1, 2w3 for bathing and personal cares s/p L humeral fracture.   As an fyi, Dr. Barth, orthopedist, has also given add orders for 1w1 2w3 for PT to continue/progress ROM, strengthenging, home safety with gait, transfers s/p the humeral fracture to run the same timeframe.   Thank you, Delma Watkins, PT      5

## 2020-11-20 NOTE — H&P ADULT - NSHPREVIEWOFSYSTEMS_GEN_ALL_CORE
REVIEW OF SYSTEMS:    CONSTITUTIONAL: No weakness, fevers or chills  EYES/ENT: No visual changes;  No vertigo or throat pain   NECK: No pain or stiffness  RESPIRATORY: No cough, wheezing, hemoptysis; No shortness of breath  CARDIOVASCULAR: +palpitations. No chest pain.  GASTROINTESTINAL: No abdominal or epigastric pain. No nausea, vomiting, or hematemesis; No diarrhea or constipation. No melena or hematochezia.  GENITOURINARY: No dysuria, frequency or hematuria  NEUROLOGICAL: No numbness or weakness  SKIN: No itching, burning, rashes, or lesions   All other review of systems is negative unless indicated above.

## 2020-11-20 NOTE — ED PROVIDER NOTE - PROGRESS NOTE DETAILS
VSS and asymptomatic. CT A/P report pending. POC discussed with cardiology fellow at bedside. Signout given to hospitalist. Patient will be admitted to Tele. Patient aware of admission.

## 2020-11-20 NOTE — ED PROVIDER NOTE - CHIEF COMPLAINT
The patient is a 50y Female complaining of The patient is a 50y Female complaining of increased fluid around the heart per ECHO today.

## 2020-11-20 NOTE — ED PROVIDER NOTE - ATTENDING CONTRIBUTION TO CARE
50F w/ recent CCU admission for pericardial effusion (11/13-14) of unclear etiology, now on colchicine, was sent in by outpatient cardiologist for increased pericardial effusion on repeat ECHO today, concerning for tamponade. pt with vss, no sob, well appearing, discussed with his cards for admission to tele, seen by cards fellow for med admission to tele, ct chest wanted, echo performed today, cardiac workup. unclear etiology.

## 2020-11-20 NOTE — ED PROVIDER NOTE - PHYSICAL EXAMINATION
PHYSICAL EXAM:  GENERAL: NAD, Resting in bed  HEENT:  Head atraumatic, EOMI, PERRLA, conjunctiva and sclera clear; Moist mucous membranes, normal oropharynx  NECK: Supple, No JVD  CHEST/LUNG: Clear to auscultation bilaterally; No rales, rhonchi, wheezing, or rubs. Unlabored respirations on room air  HEART: Regular rate and rhythm; No murmurs, rubs, or gallops  ABDOMEN: Bowel sounds present; Soft, Nontender, Nondistended. No hepatomegaly  EXTREMITIES:  2+ Peripheral Pulses, brisk capillary refill. No clubbing, cyanosis, or edema  NERVOUS SYSTEM:  Alert & Oriented X 3, non-focal and spontaneous movements of all extremities  SKIN: No rashes or lesions

## 2020-11-20 NOTE — CONSULT NOTE ADULT - SUBJECTIVE AND OBJECTIVE BOX
Patient seen and evaluated at bedside    Chief Complaint:    HPI:  This is a 51 y/o F w/ no significant PMH presenting w/ abnormal echo showing pericardial effusion sent from [facility] US lab. Pt reports intermittent palpitations and squeezing sensation in her chest for many years. States that they occur about 2 times a month. Symptoms worsened over the summer which prompted her to f/u w/ cardiologist Dr. Mittal. Pt reports that 1 week ago, she woke up with palpitations and a chest squeezing sensation that resolved after 24 hours. Denies any pain, exertional, or pleuritic components. Dr. Mittal scheduled Echo for today and pt was told to go directly to the ED after it was performed. No hx of prior pericardial effusions. No recent viral illnesses. Had stress echo done in 2017 due to palpitations which was normal. Of note, had a left dental implant done a week ago on 11/4 and completed a 7 day course of amoxicillin. Also has chronic joint stiffness and pain, mostly in the upper thighs, for which she is planning to be seen by Rheumatology.     Denies fevers, chills, headache, dizziness, blurry vision, cough, shortness of breath, abdominal pain, n/v/d/c, urinary symptoms, rash, sick contact, sore throat.    Pt's vitals were T 98.7  /80 RR 18 POX 98% RA. Pt taken to cath labs but there was no pocket to do pericardiocentesis, pt transferred to CCU for further monitoring.  (13 Nov 2020 20:43)  TTE on 11/13 showed large pericardial effusion 1.3cm posterior, 1.4 cm lateral, 2.15cm at the RV wall and apex and consistent with tamponade.    PMHx:   Pericardial effusion    No pertinent past medical history        PSHx:   History of appendectomy    History of cholecystectomy    No significant past surgical history        Allergies:  No Known Allergies      Home Meds:    Current Medications:       FAMILY HISTORY:  No pertinent family history in first degree relatives        Social History:  Smoking History:  Alcohol Use:  Drug Use:    REVIEW OF SYSTEMS:  Constitutional:     [ ] negative [ ] fevers [ ] chills [ ] weight loss [ ] weight gain  HEENT:                  [ ] negative [ ] dry eyes [ ] eye irritation [ ] postnasal drip [ ] nasal congestion  CV:                         [ ] negative  [ ] chest pain [ ] orthopnea [ ] palpitations [ ] murmur  Resp:                     [ ] negative [ ] cough [ ] shortness of breath [ ] dyspnea [ ] wheezing [ ] sputum [ ]hemoptysis  GI:                          [ ] negative [ ] nausea [ ] vomiting [ ] diarrhea [ ] constipation [ ] abd pain [ ] dysphagia   :                        [ ] negative [ ] dysuria [ ] nocturia [ ] hematuria [ ] increased urinary frequency  Musculoskeletal: [ ] negative [ ] back pain [ ] myalgias [ ] arthralgias [ ] fracture  Skin:                       [ ] negative [ ] rash [ ] itch  Neurological:        [ ] negative [ ] headache [ ] dizziness [ ] syncope [ ] weakness [ ] numbness  Psychiatric:           [ ] negative [ ] anxiety [ ] depression  Endocrine:            [ ] negative [ ] diabetes [ ] thyroid problem  Heme/Lymph:      [ ] negative [ ] anemia [ ] bleeding problem  Allergic/Immune: [ ] negative [ ] itchy eyes [ ] nasal discharge [ ] hives [ ] angioedema    [ ] All other systems negative  [ ] Unable to assess ROS due to      Physical Exam:  T(F): 99 (11-20), Max: 99 (11-20)  HR: 100 (11-20) (86 - 110)  BP: 135/94 (11-20) (135/94 - 147/98)  RR: 20 (11-20)  SpO2: 100% (11-20)  GENERAL: No acute distress, well-developed  HEAD:  Atraumatic, Normocephalic  ENT: EOMI, PERRLA, conjunctiva and sclera clear, Neck supple, No JVD, moist mucosa  CHEST/LUNG: Clear to auscultation bilaterally; No wheeze, equal breath sounds bilaterally   BACK: No spinal tenderness  HEART: Regular rate and rhythm; No murmurs, rubs, or gallops  ABDOMEN: Soft, Nontender, Nondistended; Bowel sounds present  EXTREMITIES:  No clubbing, cyanosis, or edema  PSYCH: Nl behavior, nl affect  NEUROLOGY: AAOx3, non-focal, cranial nerves intact  SKIN: Normal color, No rashes or lesions  LINES:    Cardiovascular Diagnostic Testing:    ECG: Personally reviewed:    Echo: Personally reviewed:    Stress Testing:    Cath:    Imaging:    CXR: Personally reviewed    Labs: Personally reviewed                        14.6   8.13  )-----------( 329      ( 20 Nov 2020 18:57 )             43.1                   Thyroid Stimulating Hormone, Serum: 2.18 uIU/mL (11-14 @ 06:42)   Patient seen and evaluated at bedside    Chief Complaint:    HPI:  This is a 51 y/o F w/ no significant PMH presenting w/ abnormal echo showing pericardial effusion sent from [facility] lab. Pt reports intermittent palpitations and squeezing sensation in her chest for many years. States that they occur about 2 times a month. Symptoms worsened over the summer which prompted her to f/u w/ cardiologist Dr. Mittal. Pt reports that 1 week ago, she woke up with palpitations and a chest squeezing sensation that resolved after 24 hours. Denies any pain, exertional, or pleuritic components. Dr. Mittal scheduled Echo for today and pt was told to go directly to the ED after it was performed. No hx of prior pericardial effusions. No recent viral illnesses. Had stress echo done in 2017 due to palpitations which was normal. Of note, had a left dental implant done a week ago on 11/4 and completed a 7 day course of amoxicillin. Also has chronic joint stiffness and pain, mostly in the upper thighs, for which she is planning to be seen by Rheumatology.     Denies fevers, chills, headache, dizziness, blurry vision, cough, shortness of breath, abdominal pain, n/v/d/c, urinary symptoms, rash, sick contact, sore throat.    51 yo F with no significant prior pmhx presented to her outpatient cardiologist originally for intermittent palpitations and squeezing sensation in her chest. SHe had an TTE an outpatient. TTE on 11/13 showed large pericardial effusion 1.3cm posterior, 1.4 cm lateral, 2.15cm at the RV wall and apex and consistent with tamponade. She was taken urgently to the cath lab for pericardiocentesis but under fluoro, not consistent with tamponade and she did not have a safe pocket for the procedure. The procedure was aborted and she was admitted for evaluation. She had CT scan and inflammatory makers drawn but no underlying etiology for pericardial effusion was identified. SHe was discharged with cardiac follow up in 1 week.   She was noted during her hospitalization to have HRs as low as 30s and as high as 180s. Given concerns for ongoing palpitations, she was planned to have IR guided drainage on Monday and CT chest today to evaluate erffusion.       TTE 11/14  1. Normal left ventricular internal dimensions and wall  thicknesses.  2. Normal left ventricular systolic function. No segmental  wall motion abnormalities.  3. Pericardial effusion measuring 1.3 cm posterior to the  left ventricle.  The effusion measures 1.5 cm lateral to  the left ventricle.   The pericardial effusion measures  0.75 cm adjacent to the right ventricle as seen in the  subcostal view.  With deep breath the effusion measures 1.3  cm.  It measures 1.7 cm at the RV apex.   There is 19%  transmitral respirophasic flow variation across the mitral  valve.  There is mild diastolic invagination of the right  atrial and right ventricular free wall.  *** Compared with echocardiogram of 11/13/2020, less  respirophasic flow variation is noted.  The RA and RV  appear less compressed.  Clinical correlation required.    TTE 11/13  1. Normal left ventricular systolic function. No segmental  wall motion abnormalities.  2. Normal right ventricular size and function.  3. Estimated pulmonary artery systolic pressure equals 22  mm Hg, assuming right atrial pressure equals 8  mm Hg,  consistent with normal pulmonary pressures.  4. Large pericardial effusion. 1.3cm at posterior wall,1.4  lateral  2.15cm at the RV free wall and apex.  Resp.Variation noted at the mitral valve. There is evidence   of  RA invagination and Impaired RV filling and diastolic  collapse noted. These findings are consistent with  Tamponade physiology .  Discussed with Dr. Mittal/Leland      Cardiac Cath  : Patient reaccessed in cardiac  catheterization laboratory with 4 chamber TTE done by echocardiographer  and subcoastal imaging by IC. Much reduced fluid compared to study earlier  in day, even with multiple positions using wedge, measuring less than < 2  cm in multiple windows. CCU attending at bedside in cardiac  catheterization laboratory, who had visualized TTE earlier, in aggreement  of reduction of pericardial fluid. Risks of TAP evaluated to be higher  than benefits at this time given /90 and HR 90 with minimal space  for adequate pericardial space puncture. Plan to monitor hemodynamics  closely in CCU, repeat TTE in the morning to inform on conservative vs.  invasive management.     PMHx:   Pericardial effusion    No pertinent past medical history        PSHx:   History of appendectomy    History of cholecystectomy    No significant past surgical history        Allergies:  No Known Allergies      Home Meds:    Current Medications:       FAMILY HISTORY:  No pertinent family history in first degree relatives          Physical Exam:  T(F): 99 (11-20), Max: 99 (11-20)  HR: 100 (11-20) (86 - 110)  BP: 135/94 (11-20) (135/94 - 147/98)  RR: 20 (11-20)  SpO2: 100% (11-20)    Cardiovascular Diagnostic Testing:    ECG: Personally reviewed:    Echo: Personally reviewed:    Stress Testing:    Cath:    Imaging:    CXR: Personally reviewed    Labs: Personally reviewed                        14.6   8.13  )-----------( 329      ( 20 Nov 2020 18:57 )             43.1                   Thyroid Stimulating Hormone, Serum: 2.18 uIU/mL (11-14 @ 06:42)   Patient seen and evaluated at bedside    Chief Complaint:    HPI:          51 yo F with no significant prior pmhx presented to her outpatient cardiologist originally for intermittent palpitations and squeezing sensation in her chest. SHe had an TTE an outpatient. TTE on 11/13 showed large pericardial effusion 1.3cm posterior, 1.4 cm lateral, 2.15cm at the RV wall and apex and consistent with tamponade. She was taken urgently to the cath lab for pericardiocentesis but under fluoro, not consistent with tamponade and she did not have a safe pocket for the procedure. The procedure was aborted and she was admitted for evaluation. She had CT scan and inflammatory makers drawn but no underlying etiology for pericardial effusion was identified. SHe was discharged with cardiac follow up in 1 week.   She was noted during her hospitalization to have HRs as low as 30s and as high as 180s and notes that this has been happening at home more frequently. She does however also note that she has had large fluctations in her HRs like this in the past. Given concerns for ongoing palpitations and persistent effusion, she was planned to have IR guided drainage on Monday and CT chest today to evaluate effusion.     Denies fevers, chills, headache, dizziness, blurry vision, cough, shortness of breath, abdominal pain, n/v/d/c, urinary symptoms, rash, sick contact, sore throat.      TTE 11/14  1. Normal left ventricular internal dimensions and wall  thicknesses.  2. Normal left ventricular systolic function. No segmental  wall motion abnormalities.  3. Pericardial effusion measuring 1.3 cm posterior to the  left ventricle.  The effusion measures 1.5 cm lateral to  the left ventricle.   The pericardial effusion measures  0.75 cm adjacent to the right ventricle as seen in the  subcostal view.  With deep breath the effusion measures 1.3  cm.  It measures 1.7 cm at the RV apex.   There is 19%  transmitral respirophasic flow variation across the mitral  valve.  There is mild diastolic invagination of the right  atrial and right ventricular free wall.  *** Compared with echocardiogram of 11/13/2020, less  respirophasic flow variation is noted.  The RA and RV  appear less compressed.  Clinical correlation required.    TTE 11/13  1. Normal left ventricular systolic function. No segmental  wall motion abnormalities.  2. Normal right ventricular size and function.  3. Estimated pulmonary artery systolic pressure equals 22  mm Hg, assuming right atrial pressure equals 8  mm Hg,  consistent with normal pulmonary pressures.  4. Large pericardial effusion. 1.3cm at posterior wall,1.4  lateral  2.15cm at the RV free wall and apex.  Resp.Variation noted at the mitral valve. There is evidence   of  RA invagination and Impaired RV filling and diastolic  collapse noted. These findings are consistent with  Tamponade physiology .  Discussed with Dr. Mittal/Leland      Cardiac Cath  : Patient reaccessed in cardiac  catheterization laboratory with 4 chamber TTE done by echocardiographer  and subcoastal imaging by IC. Much reduced fluid compared to study earlier  in day, even with multiple positions using wedge, measuring less than < 2  cm in multiple windows. CCU attending at bedside in cardiac  catheterization laboratory, who had visualized TTE earlier, in aggreement  of reduction of pericardial fluid. Risks of TAP evaluated to be higher  than benefits at this time given /90 and HR 90 with minimal space  for adequate pericardial space puncture. Plan to monitor hemodynamics  closely in CCU, repeat TTE in the morning to inform on conservative vs.  invasive management.     PMHx:   Pericardial effusion    No pertinent past medical history        PSHx:   History of appendectomy    History of cholecystectomy    No significant past surgical history        Allergies:  No Known Allergies      Home Meds:  Colchicine 0.6 BID  Current Medications:       FAMILY HISTORY:  No pertinent family history in first degree relatives          Physical Exam:  T(F): 99 (11-20), Max: 99 (11-20)  HR: 100 (11-20) (86 - 110)  BP: 135/94 (11-20) (135/94 - 147/98)  RR: 20 (11-20)  SpO2: 100% (11-20)  Gen - well appearing in no acute distress  CV: RRR no m/r/g  Resp: CTAB  Abd:NT/ND    Cardiovascular Diagnostic Testing:    ECG: Personally reviewed:    Echo: Personally reviewed:    Stress Testing:    Cath:    Imaging:    CXR: Personally reviewed    Labs: Personally reviewed                        14.6   8.13  )-----------( 329      ( 20 Nov 2020 18:57 )             43.1                   Thyroid Stimulating Hormone, Serum: 2.18 uIU/mL (11-14 @ 06:42)   Patient seen and evaluated at bedside      HPI:          51 yo F with no significant prior pmhx presented to her outpatient cardiologist originally for intermittent palpitations and squeezing sensation in her chest. SHe had an TTE an outpatient. TTE on 11/13 showed large pericardial effusion 1.3cm posterior, 1.4 cm lateral, 2.15cm at the RV wall and apex and consistent with tamponade. She was taken urgently to the cath lab for pericardiocentesis but under fluoro, not consistent with tamponade and she did not have a safe pocket for the procedure. The procedure was aborted and she was admitted for evaluation. She had CT scan and inflammatory makers drawn but no underlying etiology for pericardial effusion was identified. SHe was discharged with cardiac follow up in 1 week.   She was noted during her hospitalization to have HRs as low as 30s and as high as 180s and notes that this has been happening at home more frequently. She does however also note that she has had large fluctations in her HRs like this in the past. Given concerns for ongoing palpitations and persistent effusion, she was planned to have IR guided drainage on Monday and CT chest today to evaluate effusion.     Denies fevers, chills, headache, dizziness, blurry vision, cough, shortness of breath, abdominal pain, n/v/d/c, urinary symptoms, rash, sick contact, sore throat.      TTE 11/14  1. Normal left ventricular internal dimensions and wall  thicknesses.  2. Normal left ventricular systolic function. No segmental  wall motion abnormalities.  3. Pericardial effusion measuring 1.3 cm posterior to the  left ventricle.  The effusion measures 1.5 cm lateral to  the left ventricle.   The pericardial effusion measures  0.75 cm adjacent to the right ventricle as seen in the  subcostal view.  With deep breath the effusion measures 1.3  cm.  It measures 1.7 cm at the RV apex.   There is 19%  transmitral respirophasic flow variation across the mitral  valve.  There is mild diastolic invagination of the right  atrial and right ventricular free wall.  *** Compared with echocardiogram of 11/13/2020, less  respirophasic flow variation is noted.  The RA and RV  appear less compressed.  Clinical correlation required.    TTE 11/13  1. Normal left ventricular systolic function. No segmental  wall motion abnormalities.  2. Normal right ventricular size and function.  3. Estimated pulmonary artery systolic pressure equals 22  mm Hg, assuming right atrial pressure equals 8  mm Hg,  consistent with normal pulmonary pressures.  4. Large pericardial effusion. 1.3cm at posterior wall,1.4  lateral  2.15cm at the RV free wall and apex.  Resp.Variation noted at the mitral valve. There is evidence   of  RA invagination and Impaired RV filling and diastolic  collapse noted. These findings are consistent with  Tamponade physiology .  Discussed with Dr. Mittal/Leland      Cardiac Cath  : Patient reaccessed in cardiac  catheterization laboratory with 4 chamber TTE done by echocardiographer  and subcoastal imaging by IC. Much reduced fluid compared to study earlier  in day, even with multiple positions using wedge, measuring less than < 2  cm in multiple windows. CCU attending at bedside in cardiac  catheterization laboratory, who had visualized TTE earlier, in aggreement  of reduction of pericardial fluid. Risks of TAP evaluated to be higher  than benefits at this time given /90 and HR 90 with minimal space  for adequate pericardial space puncture. Plan to monitor hemodynamics  closely in CCU, repeat TTE in the morning to inform on conservative vs.  invasive management.     PMHx:   Pericardial effusion    No pertinent past medical history        PSHx:   History of appendectomy    History of cholecystectomy    No significant past surgical history        Allergies:  No Known Allergies      Home Meds:  Colchicine 0.6 BID  Current Medications:       FAMILY HISTORY:  No pertinent family history in first degree relatives          Physical Exam:  T(F): 99 (11-20), Max: 99 (11-20)  HR: 100 (11-20) (86 - 110)  BP: 135/94 (11-20) (135/94 - 147/98)  RR: 20 (11-20)  SpO2: 100% (11-20)  Gen - well appearing in no acute distress  CV: soft  RRR no m/r/g. No friction rubs. No pulsus paradoxus  Resp: CTAB  Abd:NT/ND    Cardiovascular Diagnostic Testing:    ECG: Personally reviewed:    Echo: Personally reviewed:    Stress Testing:    Cath:    Imaging:    CXR: Personally reviewed    Labs: Personally reviewed                        14.6   8.13  )-----------( 329      ( 20 Nov 2020 18:57 )             43.1                   Thyroid Stimulating Hormone, Serum: 2.18 uIU/mL (11-14 @ 06:42)

## 2020-11-20 NOTE — ED PROVIDER NOTE - OBJECTIVE STATEMENT
50F w/ recent CCU admission for pericardial effusion (11/13-14) of unclear etiology, now on colchicine, was sent in by outpatient cardiologist for increased pericardial effusion on repeat ECHO today, concerning for tamponade.   Per discussion with outpatient cardiologist, patient had extensive workup during last admission which revealed no apparent etiology. There wasn't a good window and enough effusion for drainage, and patient was discharged with Colchicine and outpatient repeat ECHO. Repeat ECHO a few days ago had decreased effusion. Patient has chronic variation of heart rate at baseline. Last night, patient experienced another episode of wide variation of heart rate, up to 180's and down to 30's. (+) significant chest pressure during the episodes without dizziness or syncope. The symptoms resolved this AM. Patient went to a local cardiologist today for expedited appointment of repeat ECHO given symptoms, and was sent to ED after the ECHO 2/2 significant pericardial effusion concerning for tamponade.    Currently, patient is chest pain free, denied fever, SOB, cough, abdominal pain, dysuria, joint stiffness, or BLE edema. (+) watery diarrhea 2x daily since taking Colchicine. No rectal bleeding or any other sources of bleeding.

## 2020-11-20 NOTE — H&P ADULT - HISTORY OF PRESENT ILLNESS
50y F no significant PMHx p/w f/u of pericardial effusion. Pt was discharged 11/14 after presenting from [facility] US labs for pericardial effusion noted on TTE 11/13. On 11/4, the pt had a dental implant and was prescribed a 7 day course of amoxicillin. She also notes chronic joint stiffness and pain, most notably in her b/l thighs, for many years that is worse in the morning, improves with activity. She also notes experiencing palpitations and a squeezing chest sensation 1-2 times/month not exertional or positional or a/w cp or SOB. She went to see her cardiologist Dr. Mittal who scheduled a TTE 11/13 which noted a large pericardial effusion 1.3cm posterior, 1.4 cm lateral, 2.15cm at the RV wall and apex and consistent with tamponade. She was taken urgently to the cath lab for pericardiocentesis but under fluoro, not consistent with tamponade and she did not have a safe pocket for the procedure. The procedure was aborted and she was admitted to CCU for evaluation, where she got a repeat TTE which showed less compression of RA/RV. CT Chest, AP and rheumatologic labs did not show an underlying etiology. She was discharged with cardiac follow up in 1 week.     She presents 11/20 for further monitoring of the effusion sac and notes that her HR has been more labile at home, ranging from around 30 to 180. She is continuing her colchicine and otherwise denies fevers, HA, n/v, cp, SOB, abd pain, dysuria, hematuria, diarrhea, constipation.     ED course:  afebrile, HR 80-110s, -140/80-90, RR 18, 100% on RA  Received colchicine x1 and CT Chest

## 2020-11-20 NOTE — H&P ADULT - NSHPLABSRESULTS_GEN_ALL_CORE
11-20    146<H>  |  107  |  8   ----------------------------<  109<H>  5.3   |  23  |  0.65    Ca    10.5      20 Nov 2020 18:57  Phos  3.8     11-20  Mg     2.1     11-20    TPro  7.4  /  Alb  4.8  /  TBili  0.5  /  DBili  x   /  AST  21  /  ALT  20  /  AlkPhos  60  11-20    Magnesium, Serum: 2.1 mg/dL (11-20-20 @ 18:57)    Phosphorus Level, Serum: 3.8 mg/dL (11-20-20 @ 18:57)    PT/INR - ( 20 Nov 2020 18:57 )   PT: 12.9 sec;   INR: 1.08 ratio      PTT - ( 20 Nov 2020 18:57 )  PTT:27.2 sec                                    14.6   8.13  )-----------( 329      ( 20 Nov 2020 18:57 )             43.1 11-20    146<H>  |  107  |  8   ----------------------------<  109<H>  5.3   |  23  |  0.65    Ca    10.5      20 Nov 2020 18:57  Phos  3.8     11-20  Mg     2.1     11-20    TPro  7.4  /  Alb  4.8  /  TBili  0.5  /  DBili  x   /  AST  21  /  ALT  20  /  AlkPhos  60  11-20    Magnesium, Serum: 2.1 mg/dL (11-20-20 @ 18:57)    Phosphorus Level, Serum: 3.8 mg/dL (11-20-20 @ 18:57)    PT/INR - ( 20 Nov 2020 18:57 )   PT: 12.9 sec;   INR: 1.08 ratio      PTT - ( 20 Nov 2020 18:57 )  PTT:27.2 sec                                    14.6   8.13  )-----------( 329      ( 20 Nov 2020 18:57 )             43.1    < from: Transthoracic Echocardiogram (11.13.20 @ 13:41) >  Conclusions:  1. Normal left ventricular systolic function. No segmental  wall motion abnormalities.  2. Normal right ventricular size and function.  3. Estimated pulmonary artery systolic pressure equals 22  mm Hg, assuming right atrial pressure equals 8  mm Hg,  consistent with normal pulmonary pressures.  4. Large pericardial effusion. 1.3cm at posterior wall,1.4  lateral  2.15cm at the RV free wall and apex.  Resp.Variation noted at the mitral valve. There is evidence   of  RA invagination and Impaired RV filling and diastolic  collapse noted. These findings are consistent with  Tamponade physiology .  Discussed with Dr. Mittal/Leland  *** No previous Echo exam.    < from: Transthoracic Echocardiogram (11.14.20 @ 10:09) >  Conclusions:  1. Normal left ventricular internal dimensions and wall  thicknesses.  2. Normal left ventricular systolic function. No segmental  wall motion abnormalities.  3. Pericardial effusion measuring 1.3 cm posterior to the  left ventricle.  The effusion measures 1.5 cm lateral to  the left ventricle.   The pericardial effusion measures  0.75 cm adjacent to the right ventricle as seen in the  subcostal view.  With deep breath the effusion measures 1.3  cm.  It measures 1.7 cm at the RV apex.   There is 19%  transmitral respirophasic flow variation across the mitral  valve.  There is mild diastolic invagination of the right  atrial and right ventricular free wall.  *** Compared withechocardiogram of 11/13/2020, less  respirophasic flow variation is noted.  The RA and RV  appear less compressed.  Clinical correlation required.    `< from: CT Chest w/ IV Cont (11.14.20 @ 13:56) >  IMPRESSION:  Small to moderate pericardial effusion.  No evidence of malignancy in the chest, abdomen or pelvis.    < from: CT Chest No Cont (11.20.20 @ 19:00) >  Impression: Small to moderate size pericardial effusion.    `< from: 12 Lead ECG (11.13.20 @ 19:39) >  Ventricular Rate 102 BPM  Atrial Rate 102 BPM  P-R Interval 163 ms  QRS Duration 75 ms  Q-T Interval 338 ms  QTC Calculation(Bazett) 441 ms  P Axis 59 degrees  R Axis 102 degrees  T Axis 52 degrees  Diagnosis Line SINUS TACHYCARDIA  RIGHTWARD AXIS  LOW VOLTAGE QRS  CANNOT RULE OUT ANTERIOR INFARCT , AGE UNDETERMINED  ABNORMAL ECG  WHEN COMPARED WITH ECG OF 11/13/2020 16:57:00    EKG 11/20/20:   Normal sinus rhythm  Low voltage QRS  Cannot rule out Anterior infarct, age undetermined  Abnormal ECG I personally reviewed available labs, imaging and ekg  11-20    146<H>  |  107  |  8   ----------------------------<  109<H>  5.3   |  23  |  0.65    Ca    10.5      20 Nov 2020 18:57  Phos  3.8     11-20  Mg     2.1     11-20    TPro  7.4  /  Alb  4.8  /  TBili  0.5  /  DBili  x   /  AST  21  /  ALT  20  /  AlkPhos  60  11-20    Magnesium, Serum: 2.1 mg/dL (11-20-20 @ 18:57)    Phosphorus Level, Serum: 3.8 mg/dL (11-20-20 @ 18:57)    PT/INR - ( 20 Nov 2020 18:57 )   PT: 12.9 sec;   INR: 1.08 ratio      PTT - ( 20 Nov 2020 18:57 )  PTT:27.2 sec                                    14.6   8.13  )-----------( 329      ( 20 Nov 2020 18:57 )             43.1    < from: Transthoracic Echocardiogram (11.13.20 @ 13:41) >  Conclusions:  1. Normal left ventricular systolic function. No segmental  wall motion abnormalities.  2. Normal right ventricular size and function.  3. Estimated pulmonary artery systolic pressure equals 22  mm Hg, assuming right atrial pressure equals 8  mm Hg,  consistent with normal pulmonary pressures.  4. Large pericardial effusion. 1.3cm at posterior wall,1.4  lateral  2.15cm at the RV free wall and apex.  Resp.Variation noted at the mitral valve. There is evidence   of  RA invagination and Impaired RV filling and diastolic  collapse noted. These findings are consistent with  Tamponade physiology .  Discussed with Dr. Mittal/Leland  *** No previous Echo exam.    < from: Transthoracic Echocardiogram (11.14.20 @ 10:09) >  Conclusions:  1. Normal left ventricular internal dimensions and wall  thicknesses.  2. Normal left ventricular systolic function. No segmental  wall motion abnormalities.  3. Pericardial effusion measuring 1.3 cm posterior to the  left ventricle.  The effusion measures 1.5 cm lateral to  the left ventricle.   The pericardial effusion measures  0.75 cm adjacent to the right ventricle as seen in the  subcostal view.  With deep breath the effusion measures 1.3  cm.  It measures 1.7 cm at the RV apex.   There is 19%  transmitral respirophasic flow variation across the mitral  valve.  There is mild diastolic invagination of the right  atrial and right ventricular free wall.  *** Compared withechocardiogram of 11/13/2020, less  respirophasic flow variation is noted.  The RA and RV  appear less compressed.  Clinical correlation required.    `< from: CT Chest w/ IV Cont (11.14.20 @ 13:56) >  IMPRESSION:  Small to moderate pericardial effusion.  No evidence of malignancy in the chest, abdomen or pelvis.    < from: CT Chest No Cont (11.20.20 @ 19:00) >  Impression: Small to moderate size pericardial effusion.    `< from: 12 Lead ECG (11.13.20 @ 19:39) >  Ventricular Rate 102 BPM  Atrial Rate 102 BPM  P-R Interval 163 ms  QRS Duration 75 ms  Q-T Interval 338 ms  QTC Calculation(Bazett) 441 ms  P Axis 59 degrees  R Axis 102 degrees  T Axis 52 degrees  Diagnosis Line SINUS TACHYCARDIA  RIGHTWARD AXIS  LOW VOLTAGE QRS  CANNOT RULE OUT ANTERIOR INFARCT , AGE UNDETERMINED  ABNORMAL ECG  WHEN COMPARED WITH ECG OF 11/13/2020 16:57:00    EKG 11/20/20:   Normal sinus rhythm  Low voltage QRS  Cannot rule out Anterior infarct, age undetermined  Abnormal ECG

## 2020-11-20 NOTE — H&P ADULT - ATTENDING COMMENTS
INCOMPLETE ATTENDING Statement  - patient evaluated at bedside, normotensive with NSR. no JVD, no murmur appreciated, clear lungs, nontoxic appearance.  patient admitted for pericardial effusion w/ reports of tachycardia/palpitations at home since last discharge. Tentative plan for IR drain. Cardiology consulted and following. tele monitoring. 50F p/w pericardial effusion admit to medicine for further evaluation.  - patient evaluated at bedside, normotensive with NSR. no JVD, no murmur appreciated, clear lungs, nontoxic appearance.  patient admitted for pericardial effusion w/ reports of tachycardia/palpitations at home since last discharge. Tentative plan for IR drain. Cardiology consulted and following. tele monitoring.    Case presented by Dr. Bowman([email or web address]).    I was physically present for the key portions of the evaluation & management service provided. I agree with the above history, physical, and plan unless otherwise noted within the attending attestation.    Brannon Romero MD  Medicine Attending  Department of Hospital Medicine  pager: [phone] (available from 20:00 to 08:00)

## 2020-11-20 NOTE — ED PROVIDER NOTE - NS ED ROS FT
REVIEW OF SYSTEMS:    CONSTITUTIONAL: No weakness, fevers or chills  EYES/ENT: No visual changes;  No vertigo or throat pain   NECK: No pain or stiffness  RESPIRATORY: No cough, wheezing, hemoptysis; No shortness of breath  CARDIOVASCULAR: No chest pain or palpitations currently  GASTROINTESTINAL: No abdominal or epigastric pain. No nausea, vomiting, or hematemesis; (+) diarrhea. No melena or hematochezia.  GENITOURINARY: No dysuria, frequency or hematuria  NEUROLOGICAL: No numbness or weakness  SKIN: No itching, rashes

## 2020-11-20 NOTE — ED ADULT NURSE REASSESSMENT NOTE - NS ED NURSE REASSESS COMMENT FT1
Handoff report received from MATI Barron. Pt denies needs at this time. Pt A&O x 4, ambulated independently to bathroom. Pt safety maintained, call bell in reach.

## 2020-11-20 NOTE — H&P ADULT - PROBLEM SELECTOR PLAN 1
Pt continues to experience labile HR at home but otherwise denies cp, SOB. CT chest 11/20 shows small-mod pericardial effusion   - IR guided drainage 11/23  - c/w tele  - c/w colchicine  - if hypotensive, call CCU for urgent intervention

## 2020-11-20 NOTE — CONSULT NOTE ADULT - ASSESSMENT
51 yo F with pericardial effusion and palpitations, unable to tap fluid last week given location of fluid. She is admitted for planned IR guided pericardiocentesis on monday.    Monitor HD. If hypotensive, tachycardic, any clinical changes, please notify cardiology stat  -admit to tele  -continue current meds   51 yo F with pericardial effusion and palpitations, unable to tap fluid last week given location of fluid. She is admitted for planned IR guided pericardiocentesis on monday.    Monitor HD. If hypotensive, tachycardic, any clinical changes, please notify cardiology stat  -admit to tele  CT chest  -continue current meds

## 2020-11-20 NOTE — ED CLERICAL - NS ED CLERK NOTE PRE-ARRIVAL INFORMATION; ADDITIONAL PRE-ARRIVAL INFORMATION
CC/Reason For referral: PE, increase fluids HR low 30s, and high 180s  Preferred Consultant(if applicable): any  Who admits for you (if needed):any   Do you have documents you would like to fax over? no  Would you still like to speak to an ED attending? yes at [phone]

## 2020-11-20 NOTE — ED ADULT NURSE NOTE - CHIEF COMPLAINT QUOTE
has "fluid around the heart" here for that last week now had echo done and worsening fluid noted.  Denies shortness of breath, but has some palpitations intermittently

## 2020-11-21 ENCOUNTER — TRANSCRIPTION ENCOUNTER (OUTPATIENT)
Age: 50
End: 2020-11-21

## 2020-11-21 DIAGNOSIS — R10.13 EPIGASTRIC PAIN: ICD-10-CM

## 2020-11-21 PROCEDURE — 99232 SBSQ HOSP IP/OBS MODERATE 35: CPT | Mod: GC

## 2020-11-21 RX ADMIN — Medication 0.6 MILLIGRAM(S): at 05:16

## 2020-11-21 RX ADMIN — Medication 0.6 MILLIGRAM(S): at 18:04

## 2020-11-21 RX ADMIN — Medication 30 MILLILITER(S): at 10:52

## 2020-11-21 NOTE — DISCHARGE NOTE PROVIDER - CARE PROVIDERS DIRECT ADDRESSES
[email or web address]

## 2020-11-21 NOTE — DISCHARGE NOTE PROVIDER - NSDCFUSCHEDAPPT_GEN_ALL_CORE_FT
KESHA FIGUEROA ; 11/23/2020 ; NPP Cardio [address] KESHA Lorenzana ; 12/03/2020 ; NPP Med Rheum [address] [facility]  KESHA FIGUEROA ; 12/31/2020 ; NPP Cardio [address]  KESHA FIGUEROA ; 12/03/2020 ; NPP Med Rheum [address] [facility]  KESHA FIGUEROA ; 12/31/2020 ; NPP Cardio [address]

## 2020-11-21 NOTE — DISCHARGE NOTE PROVIDER - NSDCCPTREATMENT_GEN_ALL_CORE_FT
PRINCIPAL PROCEDURE  Procedure: Transthoracic echocardiogram  Findings and Treatment: Conclusions:  1. Normal left ventricular systolic function. No segmental  wall motion abnormalities.  2. Normal right ventricular size and function.  3. Estimated pulmonary artery systolic pressure equals 26  mm Hg, assuming right atrial pressure equals 8  mm Hg,  consistent with normal pulmonary pressures.  4. Moderate pericardial effusion 1.6cm at the distal RV. RA  collapse noted, Impaired RV filling. IVC small ad  collapseable. /85 Hr 80s. Similiar to the previous  studies there are signs of raised intrapericardial  pressure/early tamponade. HR 80s. BP 120s  *** Compared with echocardiogram of 11/14/2020, no  significant changes noted.

## 2020-11-21 NOTE — DISCHARGE NOTE PROVIDER - PROVIDER TOKENS
PROVIDER:[TOKEN:[[id number]],FOLLOWUP:[1 week],ESTABLISHEDPATIENT:[T]] PROVIDER:[TOKEN:[[id number]],FOLLOWUP:[1 week],ESTABLISHEDPATIENT:[T]],PROVIDER:[TOKEN:[[id number]],FOLLOWUP:[1 week],ESTABLISHEDPATIENT:[T]]

## 2020-11-21 NOTE — DISCHARGE NOTE PROVIDER - CARE PROVIDER_API CALL
Erica Mittal)  Cardiology; Internal Medicine  [id number] [address] [place] [address]  Phone: [phone]  Fax: [phone]  Established Patient  Follow Up Time: 1 week   Erica Mittal)  Cardiology; Internal Medicine  [address] [place] [address]  Phone: [phone]  Fax: [phone]  Established Patient  Follow Up Time: 1 week    Sharyn Spaulding)  [facility] at [address], [address]  [place] [address]  Phone: [phone]  Fax: [phone]  Established Patient  Follow Up Time: 1 week

## 2020-11-21 NOTE — PROGRESS NOTE ADULT - PROBLEM SELECTOR PLAN 2
Per history, possible RA but negative rheumatologic labs on previous admission   - Outpt f/u with rheumatology, referral given by PCP Dr. Sharyn Spaulding on 10/27/20 Complaining of dyspepsia likely in the setting of colchicine use  - has Maalox q6hrs PRN ordered  - will monitor

## 2020-11-21 NOTE — DISCHARGE NOTE PROVIDER - NSDCCPCAREPLAN_GEN_ALL_CORE_FT
PRINCIPAL DISCHARGE DIAGNOSIS  Diagnosis: Pericardial effusion  Assessment and Plan of Treatment: You came in because you had a worsening pericardial effusion on outpatient TTE. Your cardiologist recommended you come to the hospital for further hemodynamic monitoring. Echocardiography at the hospital showed__________. IR drained the pericardial fluid on 11/23________. Please follow up with your cardiologist in 1-2 weeks on discharge.      SECONDARY DISCHARGE DIAGNOSES  Diagnosis: Dyspepsia  Assessment and Plan of Treatment: Dyspepsia  You complained of heartburn, likely secondary from the cholchicine. We gave you antacids to help relieve your symptoms.     PRINCIPAL DISCHARGE DIAGNOSIS  Diagnosis: Pericardial effusion  Assessment and Plan of Treatment: You came in because you had a worsening pericardial effusion on outpatient TTE. Your cardiologist recommended you come to the hospital for further hemodynamic monitoring. You remained asymptomatic, hemodynacimally stable during your stay. You were further evaluated by Interventional Radiology and CT surgery, and they didn't think your effusion was currently amenable to drainage.  Echocardiography at the hospital showed EF of 60% and moderate pericardial effusion with raised intrapericardial pressure and early tamponade physiology. Cardiology recommended you have a Echocardiogram repeated outpatient in 1 week. Be discharged on a Storie monitor, and follow up with Dr. Mittal in 1 week. If you have any chest pain, palpitations, SOB, please return to the emergency room.      SECONDARY DISCHARGE DIAGNOSES  Diagnosis: Dyspepsia  Assessment and Plan of Treatment: Dyspepsia  You complained of heartburn, likely secondary from the cholchicine. We gave you antacids to help relieve your symptoms, and discontinued the cholchicine.

## 2020-11-21 NOTE — DISCHARGE NOTE PROVIDER - NSDCMRMEDTOKEN_GEN_ALL_CORE_FT
colchicine 0.6 mg oral tablet: 1 tab(s) orally every 12 hours  Vitamin D2 50,000 intl units (1.25 mg) oral capsule: 1 cap(s) orally once a week   Vitamin D2 50,000 intl units (1.25 mg) oral capsule: 1 cap(s) orally once a week

## 2020-11-21 NOTE — DISCHARGE NOTE PROVIDER - NSDCFUADDAPPT_GEN_ALL_CORE_FT
Please follow up with Dr. Mittal in 1 week  Please follow up with your PCP in 1-2 weeks  Please see Rheumatology in 1-2 weeks

## 2020-11-21 NOTE — PROGRESS NOTE ADULT - PROBLEM SELECTOR PLAN 1
Pt continues to experience labile HR at home but otherwise denies cp, SOB. CT chest 11/20 shows small-mod pericardial effusion   - IR guided drainage 11/23  - c/w tele  - c/w colchicine  - if hypotensive, call CCU for urgent intervention Pt continues to experience labile HR at home but otherwise denies cp, SOB. CT chest 11/20 shows small-mod pericardial effusion   - On Telemetry has been NSR 90s, c/w tele  - IR guided drainage 11/23  - c/w colchicine 0.6mg q12hrs  - will follow up any further cardiology recs  - if hypotensive, call CCU for urgent intervention

## 2020-11-21 NOTE — DISCHARGE NOTE PROVIDER - HOSPITAL COURSE
50y F with history of palpitations and recently diagnosed with pericardial effusion presents with worsening pericardial effusion on outpatient TTE, told to come to the hospital for further monitoring for HD stability pand planned for IR guided pericardial drainage on 11/23. While inpatient she remained HD stable, and was NSR with rates in the 90s on telemetry. Bedside TTE showed ____________. She complained of dyspepsia which improved with Maalox. She underwent IR drainage of pericardial effusion and removed ______ amount of fluid. 50y F with history of palpitations and recently diagnosed with pericardial effusion presents with worsening pericardial effusion on outpatient TTE, told to come to the hospital for further monitoring for HD stability and planned for IR guided pericardial drainage on 11/23. While inpatient she remained asymptomatic, HD stable, and was NSR with rates in the 70s - 90s on telemetry. Bedside TTE showed from 11/22 showed EF of 60% and the results listed below. She was initially on colchicine but that was discontinued per cardiology given lower suspicion for pericarditis. She was seen by IR on 11/23 but no pericardiocentesis was done given there was an inadequate window for drainage. CT surgery was consulted and they agreed the effusion was likely small to moderate, and recommended follow echo to continue monitoring. Cardiology recommended the patient have an outpatient echocardiogram in 1 week and be discharged with a biotele monitor (to be coordinated by cardiology). And she will follow up with Dr. Mittal in 1 week, to continue monitoring her effusion. She complained of dyspepsia which improved with Maalox, and discontinuation of colchicine.

## 2020-11-21 NOTE — PROGRESS NOTE ADULT - PROBLEM SELECTOR PLAN 3
DVT ppx: SCD given effusion   Diet: Regular DVT ppx: SCD given effusion   Diet: Regular  Dispo: to home after drainage Per history, possible RA but negative rheumatologic labs on previous admission   - Outpt f/u with rheumatology, referral given by PCP Dr. Sharyn Spaulding on 10/27/20

## 2020-11-21 NOTE — DISCHARGE NOTE PROVIDER - NSFOLLOWUPCLINICS_GEN_ALL_ED_FT
[facility] Rheumatology  Rheumatology  [address] [address]  Phone: [phone]  Fax:   Follow Up Time: 1 week

## 2020-11-21 NOTE — PROGRESS NOTE ADULT - SUBJECTIVE AND OBJECTIVE BOX
Adalberto Colorado MD   Internal Medicine PGY-1  Pager: NS: [phone] [facility]: [phone]    Patient is a 50y old  Female who presents with a chief complaint of pericardial effusion (20 Nov 2020 21:19)    SUBJECTIVE / OVERNIGHT EVENTS:    MEDICATIONS  (STANDING):  colchicine 0.6 milliGRAM(s) Oral every 12 hours    MEDICATIONS  (PRN):      T(C): 36.9 (11-21-20 @ 04:25), Max: 37.2 (11-20-20 @ 17:20)  HR: 72 (11-21-20 @ 04:25) (72 - 110)  BP: 106/68 (11-21-20 @ 04:25) (106/68 - 147/98)  RR: 18 (11-21-20 @ 04:25) (16 - 20)  SpO2: 98% (11-21-20 @ 04:25) (98% - 100%)    CAPILLARY BLOOD GLUCOSE    I&O's Summary    REVIEW OF SYSTEMS    General: No fevers/chills  Skin/Breast: No rash  Ophthalmologic: No vision changes  ENMT:No dysphagia or odynophagia  Respiratory and Thorax: No SOB, wheezing, cough  Cardiovascular: No chest pain or palpitations  Gastrointestinal: No n/v/d, No melena, No Hematochezia  Genitourinary:  No dysuria, No change in urinary frequency  Musculoskeletal: No joint or muscle pains.  Neurological: No focal deficits, No headache    PHYSICAL EXAM:  GENERAL: NAD, well-developed  HEAD:  Atraumatic, Normocephalic  EYES: EOMI, PERRLA, conjunctiva and sclera clear  NECK: Supple, No JVD  CHEST/LUNG: Clear to auscultation bilaterally; No wheeze  HEART: Regular rate and rhythm; No murmurs, rubs, or gallops  ABDOMEN: Soft, Nontender, Nondistended; Bowel sounds present  EXTREMITIES:  2+ Peripheral Pulses, No clubbing, cyanosis, or edema  PSYCH: AAOx3  NEUROLOGY: non-focal  SKIN: No rashes or lesions    LABS:                        14.6   8.13  )-----------( 329      ( 20 Nov 2020 18:57 )             43.1     11-20    146<H>  |  107  |  8   ----------------------------<  109<H>  5.3   |  23  |  0.65    Ca    10.5      20 Nov 2020 18:57  Phos  3.8     11-20  Mg     2.1     11-20    TPro  7.4  /  Alb  4.8  /  TBili  0.5  /  DBili  x   /  AST  21  /  ALT  20  /  AlkPhos  60  11-20    PT/INR - ( 20 Nov 2020 18:57 )   PT: 12.9 sec;   INR: 1.08 ratio      PTT - ( 20 Nov 2020 18:57 )  PTT:27.2 sec    RADIOLOGY & ADDITIONAL TESTS:    Imaging Personally Reviewed:    Consultant(s) Notes Reviewed:      Care Discussed with Consultants/Other Providers:   Adalberto Colorado MD   Internal Medicine PGY-1  Pager: NS: [phone] [facility]: [phone]    Patient is a 50y old  Female who presents with a chief complaint of pericardial effusion (20 Nov 2020 21:19)    SUBJECTIVE / OVERNIGHT EVENTS:  No acute events overnight. Pt feels well, on telemetry has been NSR 90s. Denies any fevers, chills, n/v, abdominal pain, chest pain, SOB, cough, dysuria. palpitations. No other complaints. Pt planned for IR drainage of pericardial effusion, and pending TTE today. If possible patient requesting to go home, and return Monday as an outpatient for IT drainage.    MEDICATIONS  (STANDING):  colchicine 0.6 milliGRAM(s) Oral every 12 hours    MEDICATIONS  (PRN):    T(C): 36.9 (11-21-20 @ 04:25), Max: 37.2 (11-20-20 @ 17:20)  HR: 72 (11-21-20 @ 04:25) (72 - 110)  BP: 106/68 (11-21-20 @ 04:25) (106/68 - 147/98)  RR: 18 (11-21-20 @ 04:25) (16 - 20)  SpO2: 98% (11-21-20 @ 04:25) (98% - 100%)    CAPILLARY BLOOD GLUCOSE    I&O's Summary    REVIEW OF SYSTEMS  General: No fevers/chills  Skin/Breast: No rash  Ophthalmologic: No vision changes  ENMT: No dysphagia or odynophagia  Respiratory and Thorax: No SOB, wheezing, cough  Cardiovascular: No chest pain or palpitations  Gastrointestinal: No n/v/d, No melena, No Hematochezia  Genitourinary:  No dysuria, No change in urinary frequency  Musculoskeletal: No joint or muscle pains.  Neurological: No focal deficits, No headache    PHYSICAL EXAM:  GENERAL: NAD, well-developed  HEAD:  Atraumatic, Normocephalic  EYES: EOMI, conjunctiva and sclera clear  NECK: Supple, No JVD  CHEST/LUNG: Clear to auscultation bilaterally; No wheeze  HEART: Regular rate and rhythm; No murmurs, rubs, or gallops  ABDOMEN: Soft, Nontender, Nondistended; Bowel sounds present  EXTREMITIES:  2+ Peripheral Pulses, No clubbing, cyanosis, or edema  PSYCH: AAOx3  NEUROLOGY: non-focal  SKIN: No rashes or lesions    LABS:                        14.6   8.13  )-----------( 329      ( 20 Nov 2020 18:57 )             43.1     11-20    146<H>  |  107  |  8   ----------------------------<  109<H>  5.3   |  23  |  0.65    Ca    10.5      20 Nov 2020 18:57  Phos  3.8     11-20  Mg     2.1     11-20    TPro  7.4  /  Alb  4.8  /  TBili  0.5  /  DBili  x   /  AST  21  /  ALT  20  /  AlkPhos  60  11-20    PT/INR - ( 20 Nov 2020 18:57 )   PT: 12.9 sec;   INR: 1.08 ratio      PTT - ( 20 Nov 2020 18:57 )  PTT:27.2 sec    RADIOLOGY & ADDITIONAL TESTS:    Imaging Personally Reviewed:   `< from: CT Chest No Cont (11.20.20 @ 19:00) >  EXAM:  CT CHEST                          PROCEDURE DATE:  11/20/2020      INTERPRETATION:  Clinical information: Pericardial effusion. Exam is compared to previous study of 11/14/2020.    CT scan of the chest was obtained without administration of intravenous contrast.    No hilar and/or mediastinal adenopathy is noted.    Heart is normal in size. Small to moderate size pericardial effusion is noted.    No endobronchial lesions are noted. Lungs are clear. No pleural effusions are noted.    Below the diaphragm, visualized portions of the abdomen demonstrate patient to be status post cholecystectomy.    Visualized osseous structures are within normal limits.    Impression: Small to moderate size pericardial effusion.    DAVE PAREDES MD; Attending Radiologist  This document has been electronically signed. Nov 20 2020  7:18PM    < end of copied text >    Consultant(s) Notes Reviewed: Cardiology    Care Discussed with Consultants/Other Providers: Cardiology

## 2020-11-22 LAB
ALBUMIN SERPL ELPH-MCNC: 4 G/DL — SIGNIFICANT CHANGE UP (ref 3.3–5)
ALP SERPL-CCNC: 50 U/L — SIGNIFICANT CHANGE UP (ref 40–120)
ALT FLD-CCNC: 16 U/L — SIGNIFICANT CHANGE UP (ref 10–45)
ANION GAP SERPL CALC-SCNC: 12 MMOL/L — SIGNIFICANT CHANGE UP (ref 5–17)
AST SERPL-CCNC: 15 U/L — SIGNIFICANT CHANGE UP (ref 10–40)
BILIRUB SERPL-MCNC: 0.3 MG/DL — SIGNIFICANT CHANGE UP (ref 0.2–1.2)
BUN SERPL-MCNC: 11 MG/DL — SIGNIFICANT CHANGE UP (ref 7–23)
CALCIUM SERPL-MCNC: 9.2 MG/DL — SIGNIFICANT CHANGE UP (ref 8.4–10.5)
CHLORIDE SERPL-SCNC: 107 MMOL/L — SIGNIFICANT CHANGE UP (ref 96–108)
CO2 SERPL-SCNC: 22 MMOL/L — SIGNIFICANT CHANGE UP (ref 22–31)
CREAT SERPL-MCNC: 0.67 MG/DL — SIGNIFICANT CHANGE UP (ref 0.5–1.3)
GLUCOSE SERPL-MCNC: 89 MG/DL — SIGNIFICANT CHANGE UP (ref 70–99)
HCT VFR BLD CALC: 40.2 % — SIGNIFICANT CHANGE UP (ref 34.5–45)
HGB BLD-MCNC: 13.3 G/DL — SIGNIFICANT CHANGE UP (ref 11.5–15.5)
MAGNESIUM SERPL-MCNC: 2 MG/DL — SIGNIFICANT CHANGE UP (ref 1.6–2.6)
MCHC RBC-ENTMCNC: 29.6 PG — SIGNIFICANT CHANGE UP (ref 27–34)
MCHC RBC-ENTMCNC: 33.1 GM/DL — SIGNIFICANT CHANGE UP (ref 32–36)
MCV RBC AUTO: 89.5 FL — SIGNIFICANT CHANGE UP (ref 80–100)
NRBC # BLD: 0 /100 WBCS — SIGNIFICANT CHANGE UP (ref 0–0)
PHOSPHATE SERPL-MCNC: 4.2 MG/DL — SIGNIFICANT CHANGE UP (ref 2.5–4.5)
PLATELET # BLD AUTO: 298 K/UL — SIGNIFICANT CHANGE UP (ref 150–400)
POTASSIUM SERPL-MCNC: 3.6 MMOL/L — SIGNIFICANT CHANGE UP (ref 3.5–5.3)
POTASSIUM SERPL-SCNC: 3.6 MMOL/L — SIGNIFICANT CHANGE UP (ref 3.5–5.3)
PROT SERPL-MCNC: 6.3 G/DL — SIGNIFICANT CHANGE UP (ref 6–8.3)
RBC # BLD: 4.49 M/UL — SIGNIFICANT CHANGE UP (ref 3.8–5.2)
RBC # FLD: 12.7 % — SIGNIFICANT CHANGE UP (ref 10.3–14.5)
SODIUM SERPL-SCNC: 141 MMOL/L — SIGNIFICANT CHANGE UP (ref 135–145)
WBC # BLD: 7.11 K/UL — SIGNIFICANT CHANGE UP (ref 3.8–10.5)
WBC # FLD AUTO: 7.11 K/UL — SIGNIFICANT CHANGE UP (ref 3.8–10.5)

## 2020-11-22 PROCEDURE — 99232 SBSQ HOSP IP/OBS MODERATE 35: CPT | Mod: GC

## 2020-11-22 PROCEDURE — 93306 TTE W/DOPPLER COMPLETE: CPT | Mod: 26

## 2020-11-22 RX ADMIN — Medication 0.6 MILLIGRAM(S): at 08:02

## 2020-11-22 RX ADMIN — Medication 30 MILLILITER(S): at 21:02

## 2020-11-22 RX ADMIN — Medication 0.6 MILLIGRAM(S): at 17:54

## 2020-11-22 NOTE — PROGRESS NOTE ADULT - SUBJECTIVE AND OBJECTIVE BOX
Farnaz Barron  PGY-2  Pager: [phone]    Patient is a 50y old  Female who presents with a chief complaint of pericardial effusion (21 Nov 2020 15:18)      SUBJECTIVE / OVERNIGHT EVENTS:    MEDICATIONS  (STANDING):  colchicine 0.6 milliGRAM(s) Oral every 12 hours    MEDICATIONS  (PRN):  aluminum hydroxide/magnesium hydroxide/simethicone Suspension 30 milliLiter(s) Oral every 6 hours PRN Dyspepsia      T(C): 36.8 (11-22-20 @ 04:16), Max: 37.1 (11-21-20 @ 11:26)  HR: 69 (11-22-20 @ 04:16) (69 - 86)  BP: 100/66 (11-22-20 @ 04:16) (100/66 - 114/76)  RR: 18 (11-22-20 @ 04:16) (17 - 18)  SpO2: 99% (11-22-20 @ 04:16) (97% - 100%)  CAPILLARY BLOOD GLUCOSE        I&O's Summary    21 Nov 2020 07:01  -  22 Nov 2020 07:00  --------------------------------------------------------  IN: 690 mL / OUT: 3 mL / NET: 687 mL        PHYSICAL EXAM:  PHYSICAL EXAM:    Constitutional: NAD. well-developed; well-groomed; well-nourished;  HEENT: AT/NC, PERRLA; EOMI, MMM, no oropharyngeal lesions, no erythema, no exudates, Supple neck; normal thyroid gland, no cervical lymphadenopathy  Respiratory: CTAB. equal aeration bilaterally. no wheezing, no crackes, no rhonchi. no increase in WOB  Cardiovascular: RRR, no M/R/G. 2+ distal pulses. Cap refill < 2 seconds. no JVD  Gastrointestinal: soft; NT/ND, +BS, no rebounding tenderness / guarding / HSM / mass / ascites.  Genitourinary: not examined.  Extremities: no clubbing; no cyanosis; no pedal edema, non-tender to palpation, DP and Radial pulses intact.  Skin: warm and dry; color normal: no rash: no ulcers  Neurological: A&Ox 3; responds to pain; responds to verbal commands; epicritic and protopathic sensation intact: CN nerves grossly intact.   MSK/Back: no deformities. Active and passive ROM intact; strength intact, no CVA tenderness, No joint tenderness, swelling, erythema  Psychiatric: normal mood/affect. Denies SI/HI    LABS:                        14.6   8.13  )-----------( 329      ( 20 Nov 2020 18:57 )             43.1     11-20    146<H>  |  107  |  8   ----------------------------<  109<H>  5.3   |  23  |  0.65    Ca    10.5      20 Nov 2020 18:57  Phos  3.8     11-20  Mg     2.1     11-20    TPro  7.4  /  Alb  4.8  /  TBili  0.5  /  DBili  x   /  AST  21  /  ALT  20  /  AlkPhos  60  11-20    PT/INR - ( 20 Nov 2020 18:57 )   PT: 12.9 sec;   INR: 1.08 ratio         PTT - ( 20 Nov 2020 18:57 )  PTT:27.2 sec          RADIOLOGY & ADDITIONAL TESTS:    Imaging Personally Reviewed: JAZMYN    Consultant(s) Notes Reviewed:  JAZMYN    Care Discussed with Consultants/Other Providers: JAZMYN   Farnaz Barron  PGY-2  Pager: [phone]    Patient is a 50y old  Female who presents with a chief complaint of pericardial effusion (21 Nov 2020 15:18)      SUBJECTIVE / OVERNIGHT EVENTS:  no acute events overnight.  patient remains asymptomatic.    MEDICATIONS  (STANDING):  colchicine 0.6 milliGRAM(s) Oral every 12 hours    MEDICATIONS  (PRN):  aluminum hydroxide/magnesium hydroxide/simethicone Suspension 30 milliLiter(s) Oral every 6 hours PRN Dyspepsia      T(C): 36.8 (11-22-20 @ 04:16), Max: 37.1 (11-21-20 @ 11:26)  HR: 69 (11-22-20 @ 04:16) (69 - 86)  BP: 100/66 (11-22-20 @ 04:16) (100/66 - 114/76)  RR: 18 (11-22-20 @ 04:16) (17 - 18)  SpO2: 99% (11-22-20 @ 04:16) (97% - 100%)  CAPILLARY BLOOD GLUCOSE        I&O's Summary    21 Nov 2020 07:01  -  22 Nov 2020 07:00  --------------------------------------------------------  IN: 690 mL / OUT: 3 mL / NET: 687 mL        PHYSICAL EXAM:    Constitutional: NAD. well-developed; well-groomed; well-nourished;  HEENT: AT/NC, PERRLA; EOMI, MMM, supple neck.  Respiratory: CTAB. equal aeration bilaterally. no wheezing, no crackes, no rhonchi. no increase in WOB  Cardiovascular: RRR, no M/R/G. 2+ distal pulses. Cap refill < 2 seconds.   Gastrointestinal: soft; NT/ND, +BS,  Genitourinary: not examined.  Extremities: no cyanosis; no pedal edema, non-tender to palpation, DP and Radial pulses intact.  Skin: warm and dry; color normal: no rash: no ulcers  Neurological: A&Ox 3; responds to pain; responds to verbal commands; epicritic and protopathic sensation intact: CN nerves grossly intact. moves all four extremities against gravity.  Psychiatric: normal mood/affect.    LABS:                        14.6   8.13  )-----------( 329      ( 20 Nov 2020 18:57 )             43.1     11-20    146<H>  |  107  |  8   ----------------------------<  109<H>  5.3   |  23  |  0.65    Ca    10.5      20 Nov 2020 18:57  Phos  3.8     11-20  Mg     2.1     11-20    TPro  7.4  /  Alb  4.8  /  TBili  0.5  /  DBili  x   /  AST  21  /  ALT  20  /  AlkPhos  60  11-20    PT/INR - ( 20 Nov 2020 18:57 )   PT: 12.9 sec;   INR: 1.08 ratio         PTT - ( 20 Nov 2020 18:57 )  PTT:27.2 sec          RADIOLOGY & ADDITIONAL TESTS:    Imaging Personally Reviewed: JAZMYN    Consultant(s) Notes Reviewed:  JAZMYN    Care Discussed with Consultants/Other Providers: JAZMNY

## 2020-11-22 NOTE — PROGRESS NOTE ADULT - PROBLEM SELECTOR PLAN 1
Pt continues to experience labile HR at home but otherwise denies cp, SOB. CT chest 11/20 shows small-mod pericardial effusion   - On Telemetry has been NSR 90s, c/w tele  - IR guided drainage 11/23  - c/w colchicine 0.6mg q12hrs  - will follow up any further cardiology recs  - if hypotensive, call CCU for urgent intervention Pt continues to experience labile HR at home but otherwise denies cp, SOB. CT chest 11/20 shows small-mod pericardial effusion   - On Telemetry has been NSR 90s, c/w tele  - IR guided drainage 11/23  - c/w colchicine 0.6mg q12hrs  - repeat TTE done today.   - will follow up any further cardiology recs  - if hypotensive, call CCU for urgent intervention

## 2020-11-22 NOTE — PROGRESS NOTE ADULT - PROBLEM SELECTOR PLAN 2
Complaining of dyspepsia likely in the setting of colchicine use  - has Maalox q6hrs PRN ordered  - will monitor

## 2020-11-22 NOTE — CONSULT NOTE ADULT - ASSESSMENT
Assessment: 50y Female with small pericardial effusion, TTE with signs of early tamponade, referred for percutaneous drainage.    Plan: CT-guided pericardial drainage with anesthesia in IR 11/23/2020  -Please place order for IR Procedure, approving attending Dr. Whalen  -NPO past midnight tonight  -hold therpaeutic and prophylactic anticoagulants  -maintain active type and screen x 2    Ethan Powell MD, RPVI  Chief Resident, Interventional Radiology  [facility]: [phone]) [phone]  [facility]: [phone]

## 2020-11-22 NOTE — CONSULT NOTE ADULT - SUBJECTIVE AND OBJECTIVE BOX
History of Present Illness: 50yFemale with small pericardial effusion, clinically stable but with labile BP at home, sent in to [facility] for pericardial drainage.    Allergies:   No Known Allergies    Medications (Antibiotics/Cardiovascular/Anticoagulants/Blood Products):       Vital Signs:  T(F): 98.1 (11-22-20 @ 11:48), Max: 98.2 (11-22-20 @ 04:16)  HR: 88 (11-22-20 @ 11:48) (69 - 88)  BP: 122/85 (11-22-20 @ 11:48) (100/66 - 122/85)  RR: 18 (11-22-20 @ 11:48) (17 - 18)  SpO2: 99% (11-22-20 @ 11:48) (99% - 100%)    Relevant Lab Results:            13.3  7.11)-----(298     (11-22-20 @ 07:26)         40.2     141 | 107 | 11  --------------------< 89     (11-22-20 @ 07:26)  3.6 | 22 | 0.67       PT: 12.9 11-20-20 @ 18:57  aPTT: 27.2<L> 11-20-20 @ 18:57   INR: 1.08 11-20-20 @ 18:57    Imaging: CT reviewed, there is mild interval increase in size of a small pericardial effusion which is amenable to percutaneous drainage

## 2020-11-22 NOTE — PROGRESS NOTE ADULT - PROBLEM SELECTOR PLAN 3
Per history, possible RA but negative rheumatologic labs on previous admission   - Outpt f/u with rheumatology, referral given by PCP Dr. Sharyn Spaulding on 10/27/20

## 2020-11-22 NOTE — CHART NOTE - NSCHARTNOTEFT_GEN_A_CORE
PRE-INTERVENTIONAL RADIOLOGY PROCEDURE NOTE      Procedure: Pericardiocentesis    Diagnosis/Indication: pericardial effusion with signs of early tamponade    Interventional Radiology Attending Physician: Dr. Whalen    Ordering Attending Physician: García    Pertinent Medical History:    Pertinent labs:                      13.3   7.11  )-----------( 298      ( 22 Nov 2020 07:26 )             40.2       11-22    141  |  107  |  11  ----------------------------<  89  3.6   |  22  |  0.67    Ca    9.2      22 Nov 2020 07:26  Phos  4.2     11-22  Mg     2.0     11-22    TPro  6.3  /  Alb  4.0  /  TBili  0.3  /  DBili  x   /  AST  15  /  ALT  16  /  AlkPhos  50  11-22      PT/INR - ( 20 Nov 2020 18:57 )   PT: 12.9 sec;   INR: 1.08 ratio         PTT - ( 20 Nov 2020 18:57 )  PTT:27.2 sec        Patient and Family Aware ? Yes

## 2020-11-23 ENCOUNTER — APPOINTMENT (OUTPATIENT)
Dept: CARDIOLOGY | Facility: CLINIC | Age: 50
End: 2020-11-23

## 2020-11-23 ENCOUNTER — APPOINTMENT (OUTPATIENT)
Dept: CV DIAGNOSITCS | Facility: HOSPITAL | Age: 50
End: 2020-11-23

## 2020-11-23 LAB
ALBUMIN SERPL ELPH-MCNC: 4.2 G/DL — SIGNIFICANT CHANGE UP (ref 3.3–5)
ALP SERPL-CCNC: 53 U/L — SIGNIFICANT CHANGE UP (ref 40–120)
ALT FLD-CCNC: 16 U/L — SIGNIFICANT CHANGE UP (ref 10–45)
ANION GAP SERPL CALC-SCNC: 10 MMOL/L — SIGNIFICANT CHANGE UP (ref 5–17)
APTT BLD: 26 SEC — LOW (ref 27.5–35.5)
AST SERPL-CCNC: 15 U/L — SIGNIFICANT CHANGE UP (ref 10–40)
BILIRUB SERPL-MCNC: 0.3 MG/DL — SIGNIFICANT CHANGE UP (ref 0.2–1.2)
BLD GP AB SCN SERPL QL: NEGATIVE — SIGNIFICANT CHANGE UP
BUN SERPL-MCNC: 11 MG/DL — SIGNIFICANT CHANGE UP (ref 7–23)
CALCIUM SERPL-MCNC: 9.3 MG/DL — SIGNIFICANT CHANGE UP (ref 8.4–10.5)
CHLORIDE SERPL-SCNC: 107 MMOL/L — SIGNIFICANT CHANGE UP (ref 96–108)
CO2 SERPL-SCNC: 24 MMOL/L — SIGNIFICANT CHANGE UP (ref 22–31)
CREAT SERPL-MCNC: 0.64 MG/DL — SIGNIFICANT CHANGE UP (ref 0.5–1.3)
GLUCOSE SERPL-MCNC: 104 MG/DL — HIGH (ref 70–99)
HCT VFR BLD CALC: 41.7 % — SIGNIFICANT CHANGE UP (ref 34.5–45)
HGB BLD-MCNC: 14.1 G/DL — SIGNIFICANT CHANGE UP (ref 11.5–15.5)
INR BLD: 1.08 RATIO — SIGNIFICANT CHANGE UP (ref 0.88–1.16)
MAGNESIUM SERPL-MCNC: 2.2 MG/DL — SIGNIFICANT CHANGE UP (ref 1.6–2.6)
MCHC RBC-ENTMCNC: 29.4 PG — SIGNIFICANT CHANGE UP (ref 27–34)
MCHC RBC-ENTMCNC: 33.8 GM/DL — SIGNIFICANT CHANGE UP (ref 32–36)
MCV RBC AUTO: 87.1 FL — SIGNIFICANT CHANGE UP (ref 80–100)
NRBC # BLD: 0 /100 WBCS — SIGNIFICANT CHANGE UP (ref 0–0)
PHOSPHATE SERPL-MCNC: 4.5 MG/DL — SIGNIFICANT CHANGE UP (ref 2.5–4.5)
PLATELET # BLD AUTO: 277 K/UL — SIGNIFICANT CHANGE UP (ref 150–400)
POTASSIUM SERPL-MCNC: 3.8 MMOL/L — SIGNIFICANT CHANGE UP (ref 3.5–5.3)
POTASSIUM SERPL-SCNC: 3.8 MMOL/L — SIGNIFICANT CHANGE UP (ref 3.5–5.3)
PROT SERPL-MCNC: 6.6 G/DL — SIGNIFICANT CHANGE UP (ref 6–8.3)
PROTHROM AB SERPL-ACNC: 12.8 SEC — SIGNIFICANT CHANGE UP (ref 10.6–13.6)
RBC # BLD: 4.79 M/UL — SIGNIFICANT CHANGE UP (ref 3.8–5.2)
RBC # FLD: 12.5 % — SIGNIFICANT CHANGE UP (ref 10.3–14.5)
RH IG SCN BLD-IMP: NEGATIVE — SIGNIFICANT CHANGE UP
SODIUM SERPL-SCNC: 141 MMOL/L — SIGNIFICANT CHANGE UP (ref 135–145)
WBC # BLD: 6.64 K/UL — SIGNIFICANT CHANGE UP (ref 3.8–10.5)
WBC # FLD AUTO: 6.64 K/UL — SIGNIFICANT CHANGE UP (ref 3.8–10.5)

## 2020-11-23 PROCEDURE — 99254 IP/OBS CNSLTJ NEW/EST MOD 60: CPT

## 2020-11-23 PROCEDURE — 76380 CAT SCAN FOLLOW-UP STUDY: CPT | Mod: 26

## 2020-11-23 PROCEDURE — 99233 SBSQ HOSP IP/OBS HIGH 50: CPT | Mod: GC

## 2020-11-23 RX ORDER — SODIUM CHLORIDE 9 MG/ML
1000 INJECTION INTRAMUSCULAR; INTRAVENOUS; SUBCUTANEOUS
Refills: 0 | Status: DISCONTINUED | OUTPATIENT
Start: 2020-11-23 | End: 2020-11-24

## 2020-11-23 RX ORDER — ACETAMINOPHEN 500 MG
975 TABLET ORAL EVERY 8 HOURS
Refills: 0 | Status: DISCONTINUED | OUTPATIENT
Start: 2020-11-23 | End: 2020-11-24

## 2020-11-23 RX ADMIN — SODIUM CHLORIDE 100 MILLILITER(S): 9 INJECTION INTRAMUSCULAR; INTRAVENOUS; SUBCUTANEOUS at 10:48

## 2020-11-23 RX ADMIN — Medication 975 MILLIGRAM(S): at 23:16

## 2020-11-23 RX ADMIN — Medication 30 MILLILITER(S): at 10:29

## 2020-11-23 RX ADMIN — Medication 0.6 MILLIGRAM(S): at 05:32

## 2020-11-23 RX ADMIN — SODIUM CHLORIDE 10 MILLILITER(S): 9 INJECTION INTRAMUSCULAR; INTRAVENOUS; SUBCUTANEOUS at 10:29

## 2020-11-23 NOTE — CHART NOTE - NSCHARTNOTEFT_GEN_A_CORE
Vascular & Interventional Radiology Post-Procedure Note    Pre-Procedure Diagnosis: Pericardial effusion  Post-Procedure Diagnosis: Same as pre.  Indications for Procedure: Determine etiology of effusion    Attending: Dr. Barragan  Resident: Dr. Bond    Procedure Details/Findings: Small pericardial effusion, smaller than prior CT chest. Fluid located posteriorly without safe percutaneous window. Dr. Barragan discussed with Dr. Bowser, patient remains asymptomatic, agreed to defer aspiration of fluid at this time. Can re-evaluate if patient's clinical status changes or if repeat imaging demonstrate increasing effusion.   Access (if applicable): n/a    Complications: none   Estimated Blood Loss: Minimal  Specimen: none  Contrast: none  Sedation: none  Patient Condition/Disposition: stable, to return floor.    Plan:   - discussed with Dr. Bowser, please call IR if patient's clinical status changes of if fluid increases on imaging.   - remainder of global care as per primary team.

## 2020-11-23 NOTE — PROGRESS NOTE ADULT - SUBJECTIVE AND OBJECTIVE BOX
Adalberto Colorado MD   Internal Medicine PGY-1  Pager: NS: [phone] [facility]: [id number]    INCOMPLETE NOTE Adalberto Colorado MD   Internal Medicine PGY-1  Pager: NS: [phone] [facility]: [id number]    Patient is a 50y old  Female who presents with a chief complaint of pericardial effusion (23 Nov 2020 06:37)    SUBJECTIVE / OVERNIGHT EVENTS:  No acute overnight events. Remains stable on telemetry NSR 80s-90s. Went for TTE yesterday consistent with moderate pericardial effusion and early tamponade physiology. Will undergo possible IR pericardial drainage today. She denies any fevers, chills, n/v, chest pain, palpitations, SOB, abdominal pain, dysuria. No other complaints.     MEDICATIONS  (STANDING):  colchicine 0.6 milliGRAM(s) Oral every 12 hours    MEDICATIONS  (PRN):  aluminum hydroxide/magnesium hydroxide/simethicone Suspension 30 milliLiter(s) Oral every 6 hours PRN Dyspepsia    T(C): 37.1 (11-23-20 @ 04:30), Max: 37.4 (11-22-20 @ 20:14)  HR: 75 (11-23-20 @ 04:30) (75 - 94)  BP: 105/74 (11-23-20 @ 04:30) (105/74 - 123/77)  RR: 18 (11-23-20 @ 04:30) (17 - 18)  SpO2: 99% (11-23-20 @ 04:30) (97% - 99%)    CAPILLARY BLOOD GLUCOSE    I&O's Summary    22 Nov 2020 07:01  -  23 Nov 2020 07:00  --------------------------------------------------------  IN: 540 mL / OUT: 0 mL / NET: 540 mL    REVIEW OF SYSTEMS  General: No fevers/chills  Skin/Breast: No rash  Ophthalmologic: No vision changes  ENMT: No dysphagia or odynophagia  Respiratory and Thorax: No SOB, wheezing, cough  Cardiovascular: No chest pain or palpitations  Gastrointestinal: No n/v/d, No melena, No Hematochezia  Genitourinary:  No dysuria, No change in urinary frequency  Musculoskeletal: No joint or muscle pains.  Neurological: No focal deficits, No headache    PHYSICAL EXAM:  GENERAL: NAD, well-developed  HEAD:  Atraumatic, Normocephalic  EYES: EOMI, conjunctiva and sclera clear  NECK: Supple, No JVD  CHEST/LUNG: Clear to auscultation bilaterally; No wheeze or crackles   HEART: distant heart sounds, Regular rate and rhythm; No murmurs, rubs, or gallops  ABDOMEN: Soft, Nontender, Nondistended; Bowel sounds present  EXTREMITIES:  2+ Peripheral Pulses, No clubbing, cyanosis, or edema  PSYCH: AAOx3  NEUROLOGY: non-focal  SKIN: No rashes or lesions    LABS:                        14.1   6.64  )-----------( 277      ( 23 Nov 2020 07:00 )             41.7     11-23    141  |  107  |  11  ----------------------------<  104<H>  3.8   |  24  |  0.64    Ca    9.3      23 Nov 2020 07:06  Phos  4.5     11-23  Mg     2.2     11-23    TPro  6.6  /  Alb  4.2  /  TBili  0.3  /  DBili  x   /  AST  15  /  ALT  16  /  AlkPhos  53  11-23    RADIOLOGY & ADDITIONAL TESTS:    Imaging Personally Reviewed:  < from: Transthoracic Echocardiogram (11.22.20 @ 11:44) >  ------------------------------------------------------------------------  Conclusions:  1. Normal left ventricular systolic function. No segmental  wall motion abnormalities.  2. Normal right ventricular size and function.  3. Estimated pulmonary artery systolic pressure equals 26  mm Hg, assuming right atrial pressure equals 8  mm Hg,  consistent with normal pulmonary pressures.  4. Moderate pericardial effusion 1.6cm at the distal RV. RA  collapse noted, Impaired RV filling. IVC small ad  collapseable. /85 Hr 80s. Similiar to the previous  studies there are signs of raised intrapericardial  pressure/early tamponade. HR 80s. BP 120s  *** Compared with echocardiogram of 11/14/2020, no  significant changes noted.  ------------------------------------------------------------------------  Confirmed on  11/22/2020 - 14:31:07 by JAMARCUS Blevins  ------------------------------------------------------------------------    < end of copied text >    Consultant(s) Notes Reviewed: IR and Cardiology     Care Discussed with Consultants/Other Providers: IR and Cardiology

## 2020-11-23 NOTE — CONSULT NOTE ADULT - ASSESSMENT
50y F no significant PMHx presented for f/u of pericardial effusion. Patient continue to have pericardial effusion    PLAN:    - Plan pending IR pericardiocentesis   - If IR unable to drain, will add on for pericardial window tomorrow (NPO at [facility], pre-op labs)   - Plan discussed with Attending, Dr. Daryl Chau MD  General Surgery, PGY 2   50y F no significant PMHx presented for f/u of pericardial effusion. Patient continue to have pericardial effusion, IR unable to find window, but effusion is small.     PLAN:    - No acute surgical intervention at this time  - Repeat ECHO in several days  - Plan discussed with Attending, Dr. Daryl Chau MD  General Surgery, PGY 2   50y F no significant PMHx presented for f/u of pericardial effusion. Patient continue to have pericardial effusion, IR unable to find window, but effusion is small.     PLAN:    - No acute surgical intervention at this time - Patient is stable  - Repeat ECHO in several days   - follow up with IR plans   - Plan discussed with Attending, Dr. Daryl Chau MD  General Surgery, PGY 2

## 2020-11-23 NOTE — PROVIDER CONTACT NOTE (OTHER) - RECOMMENDATIONS
Dr. Colorado aware and granted off tele for IR procedure verbally at 16:00. Will continue to monitor.

## 2020-11-23 NOTE — CHART NOTE - NSCHARTNOTEFT_GEN_A_CORE
Interventional Radiology Pre-Procedure Note    Procedure: Image guided pericardiocentesis.     Diagnosis/Indication: Persistent pericardial effusion of unclear etiology. Aspiration requested as part of work up. No need for drainage catheter placement as per discussion between Dr. Barragan and Dr. Bowser, patient remains hemodynamically stable      PAST MEDICAL & SURGICAL HISTORY:  Pericardial effusion    History of appendectomy    History of cholecystectomy         Allergies: No Known Allergies      LABS:                        14.1   6.64  )-----------( 277      ( 23 Nov 2020 07:00 )             41.7     11-23    141  |  107  |  11  ----------------------------<  104<H>  3.8   |  24  |  0.64    Ca    9.3      23 Nov 2020 07:06  Phos  4.5     11-23  Mg     2.2     11-23    TPro  6.6  /  Alb  4.2  /  TBili  0.3  /  DBili  x   /  AST  15  /  ALT  16  /  AlkPhos  53  11-23    PT/INR - ( 23 Nov 2020 07:56 )   PT: 12.8 sec;   INR: 1.08 ratio         PTT - ( 23 Nov 2020 07:56 )  PTT:26.0 sec    Procedure/ risks/ benefits were explained, informed consent obtained from patient, verbalizes understanding.

## 2020-11-23 NOTE — PROGRESS NOTE ADULT - PROBLEM SELECTOR PLAN 1
Pt continues to experience labile HR at home but otherwise denies cp, SOB. CT chest 11/20 shows small-mod pericardial effusion   - On Telemetry has been NSR 90s, c/w tele  - IR guided drainage 11/23  - c/w colchicine 0.6mg q12hrs  - repeat TTE done today.   - will follow up any further cardiology recs  - if hypotensive, call CCU for urgent intervention Pt continues to experience labile HR at home but otherwise denies cp, SOB. CT chest 11/20 shows small-mod pericardial effusion, TTE from 11/22 shows moderate effusion with early tamponade physiology   - On Telemetry has been NSR 80s-90s, c/w tele  - IR guided drainage 11/23  - c/w colchicine 0.6mg q12hrs  - repeat TTE 11/22 with moderate effusion and early tamponade physiology  - will follow up any further cardiology recs  - if hypotensive, call CCU for urgent intervention

## 2020-11-23 NOTE — CONSULT NOTE ADULT - ASSESSMENT
51 yo F with pericardial effusion and palpitations, unable to tap fluid last week given location of fluid. Currently HDS and asymptomatic. Etiology of effusion unclear.  - 1L IVF today given echo findings from 11/22  - would discontinue colchicine as this does not appear to be pericarditis  - please arrange cardiology follow up within 1 week w/ repeat echo     Bora Colorado MD  Cardiology Fellow PGY-4  [facility]    For all consults and questions:  [email or web address]   Login: [employer] 51 yo F with pericardial effusion and palpitations, unable to tap fluid last week given location of fluid. Currently HDS and asymptomatic. Etiology of effusion unclear.  - 1L IVF today given echo findings from 11/22  - would discontinue colchicine as this does not appear to be pericarditis  - please consult CT surgery as recommended by primary cardiologist  - please arrange cardiology follow up within 1 week w/ repeat echo     Bora Colorado MD  Cardiology Fellow PGY-4  [facility]    For all consults and questions:  [email or web address]   Login: [employer] KESHA FIGUEROA is a 49 yo F with pericardial effusion and palpitations, unable to tap fluid last week given location of fluid. Currently HDS and asymptomatic. Etiology of effusion unclear.  - 1L IVF today given echo findings from 11/22  - would discontinue colchicine as this does not appear to be pericarditis  - please consult CT surgery as recommended by primary cardiologist  - please arrange cardiology follow up within 1 week w/ repeat echo     Bora Colorado MD  Cardiology Fellow PGY-4  [facility]    For all consults and questions:  [email or web address]   Login: [employer]    Addendum  CT surgery recs appreciated , case further discussed with IR, plan for IR diagnostic aspiration today    -will plan for outpatient Biotel monitor prior to dc    Patient seen and examined with the fellow, the note above has been edited to reflect my independent history, physical exam, assessment and plan.      Zev Rushing MD, PhD  Cardiology Attending  [facility]    For day time coverage Mon-Fri see Non-Service Consult Attending on [email or web address], password: [email or web address]; daytime weekends covered by general cardiology consult service attending.)

## 2020-11-23 NOTE — CONSULT NOTE ADULT - SUBJECTIVE AND OBJECTIVE BOX
Patient seen and examined at bedside.    Overnight Events: no events or complaints    Review of Systems:  REVIEW OF SYSTEMS:  CONSTITUTIONAL: No weakness, fevers or chills  EYES/ENT: No visual changes;  No dysphagia  NECK: No pain or stiffness  RESPIRATORY: No cough, wheezing, hemoptysis; No shortness of breath  CARDIOVASCULAR: No chest pain or palpitations; No lower extremity edema  GASTROINTESTINAL: No abdominal or epigastric pain. No nausea, vomiting, or hematemesis; No diarrhea or constipation. No melena or hematochezia.  BACK: No back pain  GENITOURINARY: No dysuria, frequency or hematuria  NEUROLOGICAL: No numbness or weakness  SKIN: No itching, burning, rashes, or lesions   All other review of systems is negative unless indicated above.    [x ] All other systems negative  [ ] Unable to assess ROS due to    Current Meds:  aluminum hydroxide/magnesium hydroxide/simethicone Suspension 30 milliLiter(s) Oral every 6 hours PRN  colchicine 0.6 milliGRAM(s) Oral every 12 hours  sodium chloride 0.9%. 1000 milliLiter(s) IV Continuous <Continuous>      PAST MEDICAL & SURGICAL HISTORY:  Pericardial effusion    History of appendectomy    History of cholecystectomy        Vitals:  T(F): 98.7 (11-23), Max: 99.4 (11-22)  HR: 75 (11-23) (75 - 94)  BP: 105/74 (11-23) (105/74 - 123/77)  RR: 18 (11-23)  SpO2: 99% (11-23)  I&O's Summary    22 Nov 2020 07:01  -  23 Nov 2020 07:00  --------------------------------------------------------  IN: 540 mL / OUT: 0 mL / NET: 540 mL        Physical Exam:  Appearance: No acute distress; well appearing  Eyes: PERRL, EOMI, pink conjunctiva  HENT: Normal oral mucosa  Cardiovascular: RRR, S1, S2, no murmurs, rubs, or gallops; no edema; no JVD  Respiratory: Clear to auscultation bilaterally  Gastrointestinal: soft, non-tender, non-distended with normal bowel sounds  Musculoskeletal: No clubbing; no joint deformity   Neurologic: Non-focal  Lymphatic: No lymphadenopathy  Psychiatry: AAOx3, mood & affect appropriate  Skin: No rashes, ecchymoses, or cyanosis                          14.1   6.64  )-----------( 277      ( 23 Nov 2020 07:00 )             41.7     11-23    141  |  107  |  11  ----------------------------<  104<H>  3.8   |  24  |  0.64    Ca    9.3      23 Nov 2020 07:06  Phos  4.5     11-23  Mg     2.2     11-23    TPro  6.6  /  Alb  4.2  /  TBili  0.3  /  DBili  x   /  AST  15  /  ALT  16  /  AlkPhos  53  11-23    PT/INR - ( 23 Nov 2020 07:56 )   PT: 12.8 sec;   INR: 1.08 ratio         PTT - ( 23 Nov 2020 07:56 )  PTT:26.0 sec          < from: Transthoracic Echocardiogram (11.22.20 @ 11:44) >  1. Normal left ventricular systolic function. No segmental  wall motion abnormalities.  2. Normal right ventricular size and function.  3. Estimated pulmonary artery systolic pressure equals 26  mm Hg, assuming right atrial pressure equals 8  mm Hg,  consistent with normal pulmonary pressures.  4. Moderate pericardial effusion 1.6cm at the distal RV. RA  collapse noted, Impaired RV filling. IVC small ad  collapseable. /85 Hr 80s. Similiar to the previous  studies there are signs of raised intrapericardial  pressure/early tamponade. HR 80s. BP 120s  *** Compared with echocardiogram of 11/14/2020, no  significant changes noted.    < end of copied text >

## 2020-11-23 NOTE — CONSULT NOTE ADULT - SUBJECTIVE AND OBJECTIVE BOX
GENERAL SURGERY CONSULT NOTE  --------------------------------------------------------------------------------------------  HPI:  50y F no significant PMHx presented for f/u of pericardial effusion. Pt was initially presented with pericardial effusion on 11/13 after outpatient ECHO. Patient reportedly had intermittent chest tightness and palpations for which she was worked up with ECHO. On that admission patient was taken to the cath lab with concern for tamponade, without good pocket and admitted to the CCU, but repeat ECHO did not demonstrate hemodynamic instability and was discharged on 11/14 on colchicine with plan for follow up in a week. Initial rheumatological and infectious workup was negative. Patient present on 11/20 for follow up and found to have unchanged small to moderate pericardial effusion. Patient is planned for pericardiocentesis by IR. Thoracic surgery consulted in case IR has no window.  She denies fevers, HA, n/v, cp, SOB, abd pain, dysuria, hematuria, diarrhea, constipation.       PAST MEDICAL & SURGICAL HISTORY:  Pericardial effusion  History of appendectomy  History of cholecystectomy    FAMILY HISTORY:  No pertinent family history in first degree relatives    SOCIAL HISTORY:   Former smoker (1/2 ppd for a few years), quit in 1980s (<5 py)  social alcohol use  no illicit drugs    ALLERGIES: No Known Allergies    HOME MEDICATIONS:     CURRENT MEDICATIONS  MEDICATIONS (STANDING): sodium chloride 0.9%. 1000 milliLiter(s) IV Continuous <Continuous>  MEDICATIONS (PRN):aluminum hydroxide/magnesium hydroxide/simethicone Suspension 30 milliLiter(s) Oral every 6 hours PRN Dyspepsia    --------------------------------------------------------------------------------------------    Vitals:   T(C): 36.9 (11-23-20 @ 16:30), Max: 37.4 (11-22-20 @ 20:14)  HR: 85 (11-23-20 @ 16:30) (75 - 94)  BP: 130/85 (11-23-20 @ 16:30) (105/74 - 130/85)  RR: 18 (11-23-20 @ 16:30) (17 - 18)  SpO2: 95% (11-23-20 @ 16:30) (95% - 99%)  CAPILLARY BLOOD GLUCOSE          11-22 @ 07:01  -  11-23 @ 07:00  --------------------------------------------------------  IN:    Oral Fluid: 540 mL  Total IN: 540 mL    OUT:  Total OUT: 0 mL    Total NET: 540 mL        Height (cm): 167.6 (11-23 @ 16:30)  Weight (kg): 71.2 (11-23 @ 16:30)  BMI (kg/m2): 25.3 (11-23 @ 16:30)  BSA (m2): 1.8 (11-23 @ 16:30)      PHYSICAL EXAM:   General: NAD, Lying in bed comfortably  Neuro: A+Ox3  HEENT: NC/AT, EOMI  Cardio: RRR,   Resp: Good effort  GI/Abd: Soft, NT/ND, no rebound/guarding, no masses palpated  Vascular: All 4 extremities warm.  Skin: Intact, no breakdown  Lymphatic/Nodes: No palpable lymphadenopathy  Musculoskeletal: All 4 extremities moving spontaneously, no limitations  --------------------------------------------------------------------------------------------    LABS  CBC (11-23 @ 07:00)                              14.1                           6.64    )----------------(  277        --    % Neutrophils, --    % Lymphocytes, ANC: --                                  41.7    CBC (11-22 @ 07:26)                              13.3                           7.11    )----------------(  298        --    % Neutrophils, --    % Lymphocytes, ANC: --                                  40.2      BMP (11-23 @ 07:06)             141     |  107     |  11    		Ca++ --      Ca 9.3                ---------------------------------( 104<H>		Mg 2.2                3.8     |  24      |  0.64  			Ph 4.5     BMP (11-22 @ 07:26)             141     |  107     |  11    		Ca++ --      Ca 9.2                ---------------------------------( 89    		Mg 2.0                3.6     |  22      |  0.67  			Ph 4.2       LFTs (11-23 @ 07:06)      TPro 6.6 / Alb 4.2 / TBili 0.3 / DBili -- / AST 15 / ALT 16 / AlkPhos 53  LFTs (11-22 @ 07:26)      TPro 6.3 / Alb 4.0 / TBili 0.3 / DBili -- / AST 15 / ALT 16 / AlkPhos 50    Coags (11-23 @ 07:56)  aPTT 26.0<L> / INR 1.08 / PT 12.8          --------------------------------------------------------------------------------------------    MICROBIOLOGY      --------------------------------------------------------------------------------------------    IMAGING  < from: CT Abdomen and Pelvis w/ IV Cont (11.14.20 @ 13:56) >  IMPRESSION:    Small to moderate pericardial effusion.    No evidence of malignancy in the chest, abdomen or pelvis.        < end of copied text >      --------------------------------------------------------------------------------------------

## 2020-11-24 ENCOUNTER — TRANSCRIPTION ENCOUNTER (OUTPATIENT)
Age: 50
End: 2020-11-24

## 2020-11-24 VITALS
HEART RATE: 99 BPM | OXYGEN SATURATION: 100 % | RESPIRATION RATE: 18 BRPM | SYSTOLIC BLOOD PRESSURE: 120 MMHG | TEMPERATURE: 99 F | DIASTOLIC BLOOD PRESSURE: 80 MMHG

## 2020-11-24 PROBLEM — I31.3 PERICARDIAL EFFUSION (NONINFLAMMATORY): Chronic | Status: ACTIVE | Noted: 2020-11-20

## 2020-11-24 LAB
ALBUMIN SERPL ELPH-MCNC: 4.2 G/DL — SIGNIFICANT CHANGE UP (ref 3.3–5)
ALP SERPL-CCNC: 53 U/L — SIGNIFICANT CHANGE UP (ref 40–120)
ALT FLD-CCNC: 16 U/L — SIGNIFICANT CHANGE UP (ref 10–45)
ANION GAP SERPL CALC-SCNC: 10 MMOL/L — SIGNIFICANT CHANGE UP (ref 5–17)
APTT BLD: 25.5 SEC — LOW (ref 27.5–35.5)
AST SERPL-CCNC: 15 U/L — SIGNIFICANT CHANGE UP (ref 10–40)
BILIRUB SERPL-MCNC: 0.3 MG/DL — SIGNIFICANT CHANGE UP (ref 0.2–1.2)
BLD GP AB SCN SERPL QL: NEGATIVE — SIGNIFICANT CHANGE UP
BUN SERPL-MCNC: 12 MG/DL — SIGNIFICANT CHANGE UP (ref 7–23)
CALCIUM SERPL-MCNC: 9.3 MG/DL — SIGNIFICANT CHANGE UP (ref 8.4–10.5)
CHLORIDE SERPL-SCNC: 108 MMOL/L — SIGNIFICANT CHANGE UP (ref 96–108)
CO2 SERPL-SCNC: 24 MMOL/L — SIGNIFICANT CHANGE UP (ref 22–31)
CREAT SERPL-MCNC: 0.62 MG/DL — SIGNIFICANT CHANGE UP (ref 0.5–1.3)
GLUCOSE SERPL-MCNC: 93 MG/DL — SIGNIFICANT CHANGE UP (ref 70–99)
HCT VFR BLD CALC: 41.6 % — SIGNIFICANT CHANGE UP (ref 34.5–45)
HGB BLD-MCNC: 14.3 G/DL — SIGNIFICANT CHANGE UP (ref 11.5–15.5)
INR BLD: 1.02 RATIO — SIGNIFICANT CHANGE UP (ref 0.88–1.16)
MAGNESIUM SERPL-MCNC: 2.2 MG/DL — SIGNIFICANT CHANGE UP (ref 1.6–2.6)
MCHC RBC-ENTMCNC: 30 PG — SIGNIFICANT CHANGE UP (ref 27–34)
MCHC RBC-ENTMCNC: 34.4 GM/DL — SIGNIFICANT CHANGE UP (ref 32–36)
MCV RBC AUTO: 87.4 FL — SIGNIFICANT CHANGE UP (ref 80–100)
NRBC # BLD: 0 /100 WBCS — SIGNIFICANT CHANGE UP (ref 0–0)
PHOSPHATE SERPL-MCNC: 4.6 MG/DL — HIGH (ref 2.5–4.5)
PLATELET # BLD AUTO: 294 K/UL — SIGNIFICANT CHANGE UP (ref 150–400)
POTASSIUM SERPL-MCNC: 3.5 MMOL/L — SIGNIFICANT CHANGE UP (ref 3.5–5.3)
POTASSIUM SERPL-SCNC: 3.5 MMOL/L — SIGNIFICANT CHANGE UP (ref 3.5–5.3)
PROT SERPL-MCNC: 6.4 G/DL — SIGNIFICANT CHANGE UP (ref 6–8.3)
PROTHROM AB SERPL-ACNC: 12 SEC — SIGNIFICANT CHANGE UP (ref 10.6–13.6)
RBC # BLD: 4.76 M/UL — SIGNIFICANT CHANGE UP (ref 3.8–5.2)
RBC # FLD: 12.4 % — SIGNIFICANT CHANGE UP (ref 10.3–14.5)
RH IG SCN BLD-IMP: NEGATIVE — SIGNIFICANT CHANGE UP
SODIUM SERPL-SCNC: 142 MMOL/L — SIGNIFICANT CHANGE UP (ref 135–145)
WBC # BLD: 7.41 K/UL — SIGNIFICANT CHANGE UP (ref 3.8–10.5)
WBC # FLD AUTO: 7.41 K/UL — SIGNIFICANT CHANGE UP (ref 3.8–10.5)

## 2020-11-24 PROCEDURE — 83735 ASSAY OF MAGNESIUM: CPT

## 2020-11-24 PROCEDURE — 93005 ELECTROCARDIOGRAM TRACING: CPT

## 2020-11-24 PROCEDURE — 99232 SBSQ HOSP IP/OBS MODERATE 35: CPT

## 2020-11-24 PROCEDURE — 76380 CAT SCAN FOLLOW-UP STUDY: CPT

## 2020-11-24 PROCEDURE — 85027 COMPLETE CBC AUTOMATED: CPT

## 2020-11-24 PROCEDURE — 99239 HOSP IP/OBS DSCHRG MGMT >30: CPT | Mod: GC

## 2020-11-24 PROCEDURE — U0003: CPT

## 2020-11-24 PROCEDURE — 99233 SBSQ HOSP IP/OBS HIGH 50: CPT

## 2020-11-24 PROCEDURE — 85610 PROTHROMBIN TIME: CPT

## 2020-11-24 PROCEDURE — 86850 RBC ANTIBODY SCREEN: CPT

## 2020-11-24 PROCEDURE — 86901 BLOOD TYPING SEROLOGIC RH(D): CPT

## 2020-11-24 PROCEDURE — 84702 CHORIONIC GONADOTROPIN TEST: CPT

## 2020-11-24 PROCEDURE — 85730 THROMBOPLASTIN TIME PARTIAL: CPT

## 2020-11-24 PROCEDURE — 99285 EMERGENCY DEPT VISIT HI MDM: CPT | Mod: 25

## 2020-11-24 PROCEDURE — 86769 SARS-COV-2 COVID-19 ANTIBODY: CPT

## 2020-11-24 PROCEDURE — 86900 BLOOD TYPING SEROLOGIC ABO: CPT

## 2020-11-24 PROCEDURE — 71250 CT THORAX DX C-: CPT

## 2020-11-24 PROCEDURE — 84484 ASSAY OF TROPONIN QUANT: CPT

## 2020-11-24 PROCEDURE — 93306 TTE W/DOPPLER COMPLETE: CPT

## 2020-11-24 PROCEDURE — 80053 COMPREHEN METABOLIC PANEL: CPT

## 2020-11-24 PROCEDURE — 84100 ASSAY OF PHOSPHORUS: CPT

## 2020-11-24 RX ADMIN — Medication 975 MILLIGRAM(S): at 00:00

## 2020-11-24 NOTE — PROGRESS NOTE ADULT - PROBLEM SELECTOR PLAN 4
DVT ppx: SCD given effusion   Diet: Regular  Dispo: to home after drainage DVT ppx: SCD given effusion   Diet: Regular  Dispo: to home

## 2020-11-24 NOTE — PROGRESS NOTE ADULT - PROBLEM SELECTOR PROBLEM 1
Pericardial effusion

## 2020-11-24 NOTE — PROGRESS NOTE ADULT - ASSESSMENT
50y F no significant PMHx p/w f/u of pericardial effusion with plan for IR guided drainage 11/23. 50y F no significant PMHx p/w f/u of pericardial effusion HD stable, IR not able to find a window to drain effusion on 11/23.

## 2020-11-24 NOTE — PROGRESS NOTE ADULT - ATTENDING COMMENTS
Patient seen and examined, case d/w house staff.  Recurrent pericardial effusion, appears to have increased in size over serial imaging done as outpatient.  Patient is HD stable, awaiting drainage by IR on Monday.  Cards f/u.
pericardial effusion- I have left a message with Dr. Rushing to discuss pericardiocentesis inpatient vs. outpatient; dc colchicine and give IVF     Jennifer Demraco D.O.  Hospitalist Pager # [phone]
pericardial effusion deemed to be stable by cardiothoracic surgery and cardiology- to have repeat TTE in 1 week, and f/u with Dr. Mittal  cardiology to arrange for biotel today and ready for discharge    discharge today, 1 hour spent in preparation of discharge    Jennifer Demarco D.O.  Hospitalist Pager # [phone]
Patient seen and examined, case d/w house staff.  Recurrent pericardial effusion, appears to have increased in size over serial imaging done as outpatient.  Patient is HD stable, awaiting drainage by IR on Monday.  Cards f/u.

## 2020-11-24 NOTE — PROGRESS NOTE ADULT - SUBJECTIVE AND OBJECTIVE BOX
Adalberto Colorado MD   Internal Medicine PGY-1  Pager: NS: [phone] [facility]: [phone]    INCOMPLETE NOTE   Adalberto Colorado MD   Internal Medicine PGY-1  Pager: NS: [phone] [facility]: [phone]    Patient is a 50y old  Female who presents with a chief complaint of pericardial effusion (24 Nov 2020 08:04)    SUBJECTIVE / OVERNIGHT EVENTS:  No acute events overnight. Evaluated by IR yesterday, no window to perform pericardiocentesis. Stable on telemetry overnight NSR 70s-90s. Denies any chest pain, palpitations chest pain with changes in position or deep breathing. Denies any fevers, chills, n/v, abdominal pain, dysuria, tolerating diet.     MEDICATIONS  (STANDING):  sodium chloride 0.9%. 1000 milliLiter(s) (100 mL/Hr) IV Continuous <Continuous>    MEDICATIONS  (PRN):  acetaminophen   Tablet .. 975 milliGRAM(s) Oral every 8 hours PRN Temp greater or equal to 38C (100.4F), Mild Pain (1 - 3), Moderate Pain (4 - 6)  aluminum hydroxide/magnesium hydroxide/simethicone Suspension 30 milliLiter(s) Oral every 6 hours PRN Dyspepsia    T(C): 36.7 (11-24-20 @ 04:20), Max: 37.1 (11-23-20 @ 11:35)  HR: 75 (11-24-20 @ 04:20) (75 - 90)  BP: 106/71 (11-24-20 @ 04:20) (106/71 - 130/85)  RR: 18 (11-24-20 @ 04:20) (18 - 18)  SpO2: 98% (11-24-20 @ 04:20) (95% - 98%)    CAPILLARY BLOOD GLUCOSE    I&O's Summary    REVIEW OF SYSTEMS  General: No fevers/chills  Skin/Breast: No rash  Ophthalmologic: No vision changes  ENMT: No dysphagia or odynophagia  Respiratory and Thorax: No SOB, wheezing, cough  Cardiovascular: No chest pain or palpitations  Gastrointestinal: No n/v/d, No melena, No Hematochezia  Genitourinary:  No dysuria, No change in urinary frequency  Musculoskeletal: No joint or muscle pains.  Neurological: No focal deficits, No headache    PHYSICAL EXAM:  GENERAL: NAD, well-developed  HEAD:  Atraumatic, Normocephalic  EYES: EOMI, conjunctiva and sclera clear  NECK: Supple, No JVD  CHEST/LUNG: Clear to auscultation bilaterally; No wheeze or crackles   HEART: distant heart sounds, Regular rate and rhythm; No murmurs, rubs, or gallops  ABDOMEN: Soft, Nontender, Nondistended; Bowel sounds present  EXTREMITIES:  2+ Peripheral Pulses, No clubbing, cyanosis, or edema  PSYCH: AAOx3  NEUROLOGY: non-focal  SKIN: No rashes or lesions    LABS:                        14.3   7.41  )-----------( 294      ( 24 Nov 2020 05:51 )             41.6     11-24    142  |  108  |  12  ----------------------------<  93  3.5   |  24  |  0.62    Ca    9.3      24 Nov 2020 05:51  Phos  4.6     11-24  Mg     2.2     11-24    TPro  6.4  /  Alb  4.2  /  TBili  0.3  /  DBili  x   /  AST  15  /  ALT  16  /  AlkPhos  53  11-24    PT/INR - ( 23 Nov 2020 07:56 )   PT: 12.8 sec;   INR: 1.08 ratio       PTT - ( 23 Nov 2020 07:56 )  PTT:26.0 sec    RADIOLOGY & ADDITIONAL TESTS:    Imaging Personally Reviewed: Reviewed    Consultant(s) Notes Reviewed: Cardiology    Care Discussed with Consultants/Other Providers: Cardiology

## 2020-11-24 NOTE — PROGRESS NOTE ADULT - SUBJECTIVE AND OBJECTIVE BOX
Patient seen and examined at bedside.    Overnight Events:   - did not undergo pericardiocentesis w/ IR  - no complaints or issues    Review of Systems:  REVIEW OF SYSTEMS:  CONSTITUTIONAL: No weakness, fevers or chills  EYES/ENT: No visual changes;  No dysphagia  NECK: No pain or stiffness  RESPIRATORY: No cough, wheezing, hemoptysis; No shortness of breath  CARDIOVASCULAR: No chest pain or palpitations; No lower extremity edema  GASTROINTESTINAL: No abdominal or epigastric pain. No nausea, vomiting, or hematemesis; No diarrhea or constipation. No melena or hematochezia.  BACK: No back pain  GENITOURINARY: No dysuria, frequency or hematuria  NEUROLOGICAL: No numbness or weakness  SKIN: No itching, burning, rashes, or lesions   All other review of systems is negative unless indicated above.    [x ] All other systems negative  [ ] Unable to assess ROS due to    Current Meds:  acetaminophen   Tablet .. 975 milliGRAM(s) Oral every 8 hours PRN  aluminum hydroxide/magnesium hydroxide/simethicone Suspension 30 milliLiter(s) Oral every 6 hours PRN  sodium chloride 0.9%. 1000 milliLiter(s) IV Continuous <Continuous>      PAST MEDICAL & SURGICAL HISTORY:  Pericardial effusion    History of appendectomy    History of cholecystectomy        Vitals:  T(F): 98.1 (11-24), Max: 98.8 (11-23)  HR: 75 (11-24) (75 - 90)  BP: 106/71 (11-24) (106/71 - 130/85)  RR: 18 (11-24)  SpO2: 98% (11-24)  I&O's Summary      Physical Exam:  Appearance: No acute distress; well appearing  Eyes: PERRL, EOMI, pink conjunctiva  HENT: Normal oral mucosa  Cardiovascular: RRR, S1, S2, no murmurs, rubs, or gallops; no edema; no JVD  Respiratory: Clear to auscultation bilaterally  Gastrointestinal: soft, non-tender, non-distended with normal bowel sounds  Musculoskeletal: No clubbing; no joint deformity   Neurologic: Non-focal  Lymphatic: No lymphadenopathy  Psychiatry: AAOx3, mood & affect appropriate  Skin: No rashes, ecchymoses, or cyanosis                          14.3   7.41  )-----------( 294      ( 24 Nov 2020 05:51 )             41.6     11-24    142  |  108  |  12  ----------------------------<  93  3.5   |  24  |  0.62    Ca    9.3      24 Nov 2020 05:51  Phos  4.6     11-24  Mg     2.2     11-24    TPro  6.4  /  Alb  4.2  /  TBili  0.3  /  DBili  x   /  AST  15  /  ALT  16  /  AlkPhos  53  11-24    PT/INR - ( 23 Nov 2020 07:56 )   PT: 12.8 sec;   INR: 1.08 ratio         PTT - ( 23 Nov 2020 07:56 )  PTT:26.0 sec              New ECG(s): Personally reviewed    Echo:    Stress Testing:     Cath:    Imaging:    Interpretation of Telemetry:

## 2020-11-24 NOTE — PROGRESS NOTE ADULT - PROBLEM SELECTOR PLAN 1
Pt continues to experience labile HR at home but otherwise denies cp, SOB. CT chest 11/20 shows small-mod pericardial effusion, TTE from 11/22 shows moderate effusion with early tamponade physiology   - On Telemetry has been NSR 80s-90s, c/w tele  - IR guided drainage 11/23  - c/w colchicine 0.6mg q12hrs  - repeat TTE 11/22 with moderate effusion and early tamponade physiology  - will follow up any further cardiology recs  - if hypotensive, call CCU for urgent intervention Pt continues to experience labile HR at home but otherwise denies cp, SOB. CT chest 11/20 shows small-mod pericardial effusion, TTE from 11/22 shows moderate effusion with early tamponade physiology   - On Telemetry has been NSR 70s-90s, c/w tele  - IR guided drainage 11/23, not able to be done due to lack of adequate window  - colchicine 0.6mg q12hrs discontinued per cards, given less likely pericarditis   - repeat TTE 11/22 with moderate effusion and early tamponade physiology  - will follow up any further cardiology recs  - s/p 1L of fluids yesterday  - if hypotensive, call CCU for urgent intervention  - will need cardiology follow up in 1 week and repeat echo  - will need to be discharged on [employer] monitor

## 2020-11-24 NOTE — DISCHARGE NOTE NURSING/CASE MANAGEMENT/SOCIAL WORK - PATIENT PORTAL LINK FT
You can access the FollowMyHealth Patient Portal offered by [facility] by registering at the following website: [email or web address]. By joining [facility]’s FollowMyHealth portal, you will also be able to view your health information using other applications (apps) compatible with our system.

## 2020-11-24 NOTE — PROGRESS NOTE ADULT - SUBJECTIVE AND OBJECTIVE BOX
Subjective:     PAST MEDICAL & SURGICAL HISTORY:  Pericardial effusion    History of appendectomy    History of cholecystectomy                     MEDICATIONS  acetaminophen   Tablet .. 975 milliGRAM(s) Oral every 8 hours PRN  aluminum hydroxide/magnesium hydroxide/simethicone Suspension 30 milliLiter(s) Oral every 6 hours PRN  sodium chloride 0.9%. 1000 milliLiter(s) IV Continuous <Continuous>      Vital Signs Last 24 Hrs  T(C): 36.7 (24 Nov 2020 04:20), Max: 37.1 (23 Nov 2020 11:35)  T(F): 98.1 (24 Nov 2020 04:20), Max: 98.8 (23 Nov 2020 11:35)  HR: 75 (24 Nov 2020 04:20) (75 - 90)  BP: 106/71 (24 Nov 2020 04:20) (106/71 - 130/85)  BP(mean): --  RR: 18 (24 Nov 2020 04:20) (18 - 18)  SpO2: 98% (24 Nov 2020 04:20) (95% - 98%)      PHYSICAL EXAM:  Neurology: alert and oriented x 3, nonfocal, no gross deficits  CV :  Lungs:  Extremities:       CT:     CXR:    LABS  11-24    142  |  108  |  12  ----------------------------<  93  3.5   |  24  |  0.62    Ca    9.3      24 Nov 2020 05:51  Phos  4.6     11-24  Mg     2.2     11-24    TPro  6.4  /  Alb  4.2  /  TBili  0.3  /  DBili  x   /  AST  15  /  ALT  16  /  AlkPhos  53  11-24                                 14.3   7.41  )-----------( 294      ( 24 Nov 2020 05:51 )             41.6          PT/INR - ( 24 Nov 2020 09:17 )   PT: 12.0 sec;   INR: 1.02 ratio         PTT - ( 24 Nov 2020 09:17 )  PTT:25.5 sec                    PAST MEDICAL & SURGICAL HISTORY:  Pericardial effusion    History of appendectomy    History of cholecystectomy                Subjective:    PAST MEDICAL & SURGICAL HISTORY:  Pericardial effusion  History of appendectomy  History of cholecystectomy                  MEDICATIONS  acetaminophen Tablet 975 milliGRAM(s) Oral every 8 hours PRN  aluminum hydroxide/magnesium hydroxide/simethicone Suspension 30 milliLiter(s) Oral every 6 hours PRN  sodium chloride 0.9%. 1000 milliLiter(s) IV Continuous <Continuous>      Vital Signs Last 24 Hrs  T(C): 36.7 (24 Nov 2020 04:20), Max: 37.1 (23 Nov 2020 11:35)  T(F): 98.1 (24 Nov 2020 04:20), Max: 98.8 (23 Nov 2020 11:35)  HR: 75 (24 Nov 2020 04:20) (75 - 90)  BP: 106/71 (24 Nov 2020 04:20) (106/71 - 130/85)  RR: 18 (24 Nov 2020 04:20) (18 - 18)  SpO2: 98% (24 Nov 2020 04:20) (95% - 98%)      PHYSICAL EXAM:  Neurology: alert and oriented x 3, nonfocal, no gross deficits  CV: RRR, +S1/S2  Lungs: CTA b/l, no wheezes, rales or rhonchi. no increased work of breathing   Extremities: LEDESMA, warm and well perfused distally       CT Chest No Cont (11.20.20 @ 19:00): Small to moderate size pericardial effusion.      Transthoracic Echocardiogram (11.22.20 @ 11:44): 1. Normal left ventricular systolic function. No segmental wall motion abnormalities. 2. Normal right ventricular size and function. 3. Estimated pulmonary artery systolic pressure equals 26 mm Hg, assuming right atrial pressure equals 8  mm Hg, consistent with normal pulmonary pressures. 4. Moderate pericardial effusion 1.6cm at the distal RV. RA collapse noted, Impaired RV filling. IVC small ad collapsable. /85 Hr 80s. Similar to the previous studies there are signs of raised intrapericardial pressure/early tamponade. HR 80s. BP 120s      LABS  11-24    142  |  108  |  12  ----------------------------<  93  3.5   |  24  |  0.62    Ca    9.3      24 Nov 2020 05:51  Phos  4.6     11-24  Mg     2.2     11-24    TPro  6.4  /  Alb  4.2  /  TBili  0.3  /  DBili  x   /  AST  15  /  ALT  16  /  AlkPhos  53  11-24                                 14.3   7.41  )-----------( 294      ( 24 Nov 2020 05:51 )             41.6          PT/INR - ( 24 Nov 2020 09:17 )   PT: 12.0 sec;   INR: 1.02 ratio      PTT - ( 24 Nov 2020 09:17 )  PTT:25.5 sec

## 2020-11-24 NOTE — PROGRESS NOTE ADULT - PROBLEM SELECTOR PLAN 2
Complaining of dyspepsia likely in the setting of colchicine use  - has Maalox q6hrs PRN ordered  - will monitor Complaining of dyspepsia likely in the setting of colchicine use  - has Maalox q6hrs PRN ordered  - will monitor, now resolved since being off colchicine

## 2020-11-24 NOTE — PROGRESS NOTE ADULT - PROBLEM SELECTOR PLAN 1
IR unable to drain 11/23 as there was no safe window, but effusion is small   No need for acute surgical intervention at this time as per Dr. Brito   Dispo as per primary team - pt planned for d/c home today 11/24 to f/u with cardiology as outpatient for repeat TTE in 1 week   Pt may f/u w/ Dr. Brito as outpatient if referred by cardiology

## 2020-11-24 NOTE — PROGRESS NOTE ADULT - REASON FOR ADMISSION
pericardial effusion

## 2020-11-24 NOTE — PROGRESS NOTE ADULT - ASSESSMENT
50y F no significant PMHx presented for f/u of pericardial effusion. Patient continue to have pericardial effusion, IR unable to find window, but effusion is small.     11/24: VSS, NAD. Radiographic imaging reviewed by Dr. Brito - effusion appears small, no need for acute surgical intervention at this time. Per primary team, pt is scheduled for d/c home today to f/u w/ cardiology for repeat echo in 1 week as outpt. D/w Dr. Brito - pt may f/u with her as outpatient if referred by cardiology.

## 2020-11-24 NOTE — PROGRESS NOTE ADULT - ASSESSMENT
Ms. Raven Pickering is a 51 yo F with pericardial effusion and palpitations, unable to tap fluid last week given location of fluid. Currently HDS and asymptomatic. Etiology of effusion unclear.   - CT surgery recs appreciated  - case further discussed with IR; did not undergo pericardiocentesis  - please arrange cardiology follow up with primary cardiologist within 1 week w/ repeat echo   - please arrange for Biotel monitor prior to d/c    Bora Colorado MD  Cardiology Fellow PGY-4  [facility]    For all consults and questions:  [email or web address]   Login: [email or web address]! Stuff Ms. Raven Pickering is a 51 yo F with pericardial effusion and palpitations, unable to tap fluid last week given location of fluid. Currently HDS and asymptomatic. Etiology of effusion unclear.   - CT surgery recs appreciated  - case further discussed with IR; did not undergo pericardiocentesis  - please arrange cardiology follow up with primary cardiologist within 1 week w/ repeat echo   - please arrange for Biotel monitor prior to d/c    Bora Colorado MD  Cardiology Fellow PGY-4  [facility]    For all consults and questions:  [email or web address]   Login: [email or web address]    Case discussed with her outpatient cardiologist,   she is scheduled for fu ECHO at [facility] 12/7/2020 at 10:40am and fu with DR Mittal at [facility] the same day    Biotel monitor to be placed prior to dc home    Patient seen and examined with the fellow, the note above has been edited to reflect my independent history, physical exam, assessment and plan.      Zev Rushing MD, PhD  Cardiology Attending  [facility]    For day time coverage Mon-Fri see Non-Service Consult Attending on [email or web address], password: [email or web address]; daytime weekends covered by general cardiology consult service attending.)

## 2020-11-30 ENCOUNTER — OUTPATIENT (OUTPATIENT)
Dept: OUTPATIENT SERVICES | Facility: HOSPITAL | Age: 50
LOS: 1 days | End: 2020-11-30
Payer: COMMERCIAL

## 2020-11-30 ENCOUNTER — APPOINTMENT (OUTPATIENT)
Dept: CARDIOLOGY | Facility: CLINIC | Age: 50
End: 2020-11-30
Payer: COMMERCIAL

## 2020-11-30 ENCOUNTER — NON-APPOINTMENT (OUTPATIENT)
Age: 50
End: 2020-11-30

## 2020-11-30 ENCOUNTER — APPOINTMENT (OUTPATIENT)
Dept: CV DIAGNOSITCS | Facility: HOSPITAL | Age: 50
End: 2020-11-30

## 2020-11-30 VITALS
BODY MASS INDEX: 25.07 KG/M2 | HEART RATE: 96 BPM | OXYGEN SATURATION: 100 % | HEIGHT: 66 IN | DIASTOLIC BLOOD PRESSURE: 85 MMHG | SYSTOLIC BLOOD PRESSURE: 122 MMHG | WEIGHT: 156 LBS

## 2020-11-30 DIAGNOSIS — Z90.49 ACQUIRED ABSENCE OF OTHER SPECIFIED PARTS OF DIGESTIVE TRACT: Chronic | ICD-10-CM

## 2020-11-30 DIAGNOSIS — I25.10 ATHEROSCLEROTIC HEART DISEASE OF NATIVE CORONARY ARTERY WITHOUT ANGINA PECTORIS: ICD-10-CM

## 2020-11-30 PROCEDURE — 99214 OFFICE O/P EST MOD 30 MIN: CPT

## 2020-11-30 PROCEDURE — 93000 ELECTROCARDIOGRAM COMPLETE: CPT

## 2020-11-30 PROCEDURE — 99072 ADDL SUPL MATRL&STAF TM PHE: CPT

## 2020-11-30 PROCEDURE — 93306 TTE W/DOPPLER COMPLETE: CPT | Mod: 26

## 2020-11-30 PROCEDURE — 93306 TTE W/DOPPLER COMPLETE: CPT

## 2020-11-30 RX ORDER — COLCHICINE 0.6 MG/1
0.6 TABLET ORAL TWICE DAILY
Qty: 180 | Refills: 1 | Status: DISCONTINUED | COMMUNITY
Start: 2020-11-16 | End: 2020-11-30

## 2020-12-01 ENCOUNTER — NON-APPOINTMENT (OUTPATIENT)
Age: 50
End: 2020-12-01

## 2020-12-01 NOTE — HISTORY OF PRESENT ILLNESS
[FreeTextEntry1] : 50 year old odalis menopausal female with history of intermittent symptomatic palpitations that occur 1-2 times per month.\par \par She was last seen in the office in June of 2107 and was lost to follow up.. \par \par Palpitations are abrupt in onset that is not triggered by exertion or emotional distress. Of note, patient drinks little to no caffeine daily.\par \par This arrhythmia presents without provocation and can last between minutes to several hours in duration.\par Arrhythmia provokes the urge to cough, however,  patient has not tried to self terminate this heart rhythm with bearing down or vigorous coughing. \par \par She has an apple watch , which records heart rate 24 hours per day and she has graph results with sudden onset of rapid heart rate ranging in the high 100's to 120 bpm- as high as 160 bpm. \par \par Patient has undergone a stress echo in June of 2017. Testing revealed: \par Normal LV function\par LVEF 67%\par 11 METS of activity which was excellent for her age and gender. \par \par She returns today with an extensive record of her heart rate events. Most recent elevated heart rate at rest was on September 29, 2020.\par Max heart rate at 208 bpm recorded in August\par \par These elevated rates occur at most twice per month. Lasting seconds to a few minutes.\par She denies any dizziness, chest pain or shortness of breath.\par \par Patient presents today for a cardiac reevaluation. \par \par Interval Note\par November 16, 2020\par \par Patient's history is as noted above. She reports that she presented to the office for an echocardiogram \par on Friday, November 13, 2020. \par Echo demonstrated:  a large pericardial effusion: 1.3 cm posterior, 1.4 cm lateral, 2.15 cm at the RV free wall and apex. These findings were consistent with a cardiac tamponade. \par \par She was urgently taken to the cardiac cath lab for a pericardicentesis. However,  fluoroscopy was not consistent with the echocardiographic findings and the procedure was aborted. Patient was admitted overnight for evaluation.\par Inflammatory markers were drawn and a full CT scan with IV contrast was performed on her chest and abdomen. No underlying cause for pericardial effusion was identified. Patient was discharged and requested to have this cardiac follow up. \par \par On the previous week, November 6, 2020, patient had called to report palpitations and a labile pulse rate.\par She called and the decision was to place a Zio patch after she completed her echocardiogram. Since she went to the Cath lab, patch was never placed. \par \par To-date, patient has no further complaints of labile pulse, shortness of breath or chest discomfort.\par \par She has been placed on oral Colchicine .6 BID for treatment of pericarditis. \par \par Of note, patient underwent a dental post implant on  November 4, 2020.\par Additionally, she reported that she had a rash-like reaction to her flu shot on October 27 2020. \par \par Interval NOte\par November 30, 2020\par \par Patient presents today after an emergency visit to the ER on November 20, 2020. \par  \par She had reported that her heart rate was extremely labile the night before: running from as "low as 38 bpm and as high as 140- 150 bpm."\par \par  She had presented  to an outpatient cardiologist at her PCP's office, Dr. Tomás Beltrna. He performed an echocardiogram which demonstrated significant pericardial fluid with RV impingement. \par \par Decision was made to report to the [place] ER. Patient underwent an extensive work up which included: CT of Chest, telemetry and repeat echocardiograms.\par \par Images were evaluated by interventional radiology and CT surgery. Both teams felt that there was not sufficient fluid for safe access to drain or remove fluid cells for malignancy assessment.\par \par Of note, prior to her hospitalization this past weekend, patient underwent CT scans of her chest, abdomen and pelvis which were all negative for evidence of an occult malignancy. \par \par Clinically, patient reports feeling well. She was discharged from the hospital wearing a Hightail monitor. Patient reports, that since discharge, she has not experienced any palpitations or "rapid heart rates".\par \par Otherwise, reports feeling well with no issues of chest pain or shortness of breath.\par \par \par \par \par \par \par \par \par

## 2020-12-01 NOTE — ASSESSMENT
[FreeTextEntry1] : 50 year old female presented  to the office a few weeks ago with complaints of episodes of rapid HR seen on Apple watch.\par  Work up in past had been nl ( except prolonged monitoring was not done). She saw on new internist and then came 11/13 for echo. Hernán Mendez and Leland reported large effusion . She was admitted to ED , transferred to cath lab for drainage and was found o have less fluid ( not large enough to tap). She was admitted overnight for observation and d/patricia on colchicine.\par \par Readmitted to ER on November 20, 2020 for rapid heart rates and ongoing pericardial effusion of unknown etiology.\par Patient remained at [place] on telemetry from 11/20- 11/23. No evidence of tachycardia was identified.\par Images reviewed by IR and CT surgery. Both divisions felt that there was insufficient fluid to tap. \par \par Of note, she had undergone malignancy scans of her chest, abdomen and pelvis which were all negative. \par \par Currently, patient not taking colchicine due to GI intolerance. Negative evidence of an inflammatory markers.\par \par Will follow up after Biotel results.\par

## 2020-12-01 NOTE — PHYSICAL EXAM
[General Appearance - Well Developed] : well developed [Normal Appearance] : normal appearance [Well Groomed] : well groomed [General Appearance - Well Nourished] : well nourished [No Deformities] : no deformities [General Appearance - In No Acute Distress] : no acute distress [Normal Conjunctiva] : the conjunctiva exhibited no abnormalities [Eyelids - No Xanthelasma] : the eyelids demonstrated no xanthelasmas [Normal Oral Mucosa] : normal oral mucosa [No Oral Pallor] : no oral pallor [No Oral Cyanosis] : no oral cyanosis [Normal Jugular Venous A Waves Present] : normal jugular venous A waves present [Normal Jugular Venous V Waves Present] : normal jugular venous V waves present [No Jugular Venous Richardson A Waves] : no jugular venous richardson A waves [Respiration, Rhythm And Depth] : normal respiratory rhythm and effort [Exaggerated Use Of Accessory Muscles For Inspiration] : no accessory muscle use [Auscultation Breath Sounds / Voice Sounds] : lungs were clear to auscultation bilaterally [Normal] : normal [Rhythm Regular] : regular [Normal S1] : normal S1 [Normal S2] : normal S2 [2+] : left 2+ [No Pitting Edema] : no pitting edema present [Abdomen Soft] : soft [Abdomen Tenderness] : non-tender [Abdomen Mass (___ Cm)] : no abdominal mass palpated [Abnormal Walk] : normal gait [Gait - Sufficient For Exercise Testing] : the gait was sufficient for exercise testing [Nail Clubbing] : no clubbing of the fingernails [Cyanosis, Localized] : no localized cyanosis [Petechial Hemorrhages (___cm)] : no petechial hemorrhages [Skin Color & Pigmentation] : normal skin color and pigmentation [] : no rash [No Venous Stasis] : no venous stasis [Skin Lesions] : no skin lesions [No Skin Ulcers] : no skin ulcer [No Xanthoma] : no  xanthoma was observed [Oriented To Time, Place, And Person] : oriented to person, place, and time [Affect] : the affect was normal [Mood] : the mood was normal [No Anxiety] : not feeling anxious

## 2020-12-03 ENCOUNTER — LABORATORY RESULT (OUTPATIENT)
Age: 50
End: 2020-12-03

## 2020-12-03 ENCOUNTER — APPOINTMENT (OUTPATIENT)
Dept: INTERNAL MEDICINE | Facility: CLINIC | Age: 50
End: 2020-12-03

## 2020-12-03 ENCOUNTER — APPOINTMENT (OUTPATIENT)
Dept: RHEUMATOLOGY | Facility: CLINIC | Age: 50
End: 2020-12-03
Payer: COMMERCIAL

## 2020-12-03 VITALS
SYSTOLIC BLOOD PRESSURE: 143 MMHG | HEIGHT: 66 IN | BODY MASS INDEX: 25.07 KG/M2 | HEART RATE: 70 BPM | WEIGHT: 156 LBS | TEMPERATURE: 97.9 F | DIASTOLIC BLOOD PRESSURE: 91 MMHG | OXYGEN SATURATION: 95 %

## 2020-12-03 LAB
IGG SERPL-MCNC: 1002 MG/DL — SIGNIFICANT CHANGE UP (ref 586–1602)
IGG1 SER-MCNC: 505 MG/DL — SIGNIFICANT CHANGE UP (ref 248–810)
IGG2 SER-MCNC: 278 MG/DL — SIGNIFICANT CHANGE UP (ref 130–555)
IGG3 SER-MCNC: 59 MG/DL — SIGNIFICANT CHANGE UP (ref 15–102)
IGG4 SER-MCNC: 38 MG/DL — SIGNIFICANT CHANGE UP (ref 2–96)

## 2020-12-03 PROCEDURE — 99245 OFF/OP CONSLTJ NEW/EST HI 55: CPT

## 2020-12-03 PROCEDURE — 99072 ADDL SUPL MATRL&STAF TM PHE: CPT

## 2020-12-04 LAB
25(OH)D3 SERPL-MCNC: 30.6 NG/ML
ALBUMIN SERPL ELPH-MCNC: 5.1 G/DL
ALP BLD-CCNC: 64 U/L
ALT SERPL-CCNC: 18 U/L
ANION GAP SERPL CALC-SCNC: 12 MMOL/L
APPEARANCE: CLEAR
AST SERPL-CCNC: 13 U/L
B BURGDOR IGG+IGM SER QL IB: NORMAL
BASOPHILS # BLD AUTO: 0.04 K/UL
BASOPHILS NFR BLD AUTO: 0.6 %
BILIRUB SERPL-MCNC: 0.2 MG/DL
BILIRUBIN URINE: NEGATIVE
BLOOD URINE: NEGATIVE
BUN SERPL-MCNC: 7 MG/DL
C3 SERPL-MCNC: 128 MG/DL
C4 SERPL-MCNC: 25 MG/DL
CALCIUM SERPL-MCNC: 10.1 MG/DL
CHLORIDE SERPL-SCNC: 105 MMOL/L
CK SERPL-CCNC: 74 U/L
CO2 SERPL-SCNC: 27 MMOL/L
COLOR: NORMAL
CONFIRM: 30.8 SEC
CREAT SERPL-MCNC: 0.66 MG/DL
CREAT SPEC-SCNC: 27 MG/DL
CREAT/PROT UR: NORMAL RATIO
CRP SERPL-MCNC: <0.1 MG/DL
DEPRECATED KAPPA LC FREE/LAMBDA SER: 1.02 RATIO
DRVVT IMM 1:2 NP PPP: NORMAL
DRVVT SCREEN TO CONFIRM RATIO: 0.83 RATIO
EOSINOPHIL # BLD AUTO: 0.11 K/UL
EOSINOPHIL NFR BLD AUTO: 1.5 %
ERYTHROCYTE [SEDIMENTATION RATE] IN BLOOD BY WESTERGREN METHOD: 9 MM/HR
GLUCOSE QUALITATIVE U: NEGATIVE
GLUCOSE SERPL-MCNC: 107 MG/DL
HBV CORE IGG+IGM SER QL: NONREACTIVE
HBV SURFACE AB SER QL: NONREACTIVE
HBV SURFACE AG SER QL: NONREACTIVE
HCT VFR BLD CALC: 44.2 %
HCV AB SER QL: NONREACTIVE
HCV S/CO RATIO: 0.09 S/CO
HGB BLD-MCNC: 14 G/DL
IGA SER QL IEP: 215 MG/DL
IGG SER QL IEP: 924 MG/DL
IGM SER QL IEP: 71 MG/DL
IMM GRANULOCYTES NFR BLD AUTO: 0.3 %
KAPPA LC CSF-MCNC: 1.16 MG/DL
KAPPA LC SERPL-MCNC: 1.18 MG/DL
KETONES URINE: NEGATIVE
LDH SERPL-CCNC: 178 U/L
LEUKOCYTE ESTERASE URINE: NEGATIVE
LYMPHOCYTES # BLD AUTO: 1.52 K/UL
LYMPHOCYTES NFR BLD AUTO: 21.4 %
MAN DIFF?: NORMAL
MCHC RBC-ENTMCNC: 28.8 PG
MCHC RBC-ENTMCNC: 31.7 GM/DL
MCV RBC AUTO: 90.9 FL
MONOCYTES # BLD AUTO: 0.59 K/UL
MONOCYTES NFR BLD AUTO: 8.3 %
MPO AB + PR3 PNL SER: NORMAL
MYOGLOBIN SERPL-MCNC: <21 NG/ML
NEUTROPHILS # BLD AUTO: 4.82 K/UL
NEUTROPHILS NFR BLD AUTO: 67.9 %
NITRITE URINE: NEGATIVE
PH URINE: 7
PLATELET # BLD AUTO: 303 K/UL
POTASSIUM SERPL-SCNC: 4.1 MMOL/L
PROT SERPL-MCNC: 7.6 G/DL
PROT UR-MCNC: <4 MG/DL
PROTEIN URINE: NEGATIVE
RBC # BLD: 4.86 M/UL
RBC # FLD: 12.8 %
SARS-COV-2 IGG SERPL IA-ACNC: 0.11 INDEX
SARS-COV-2 IGG SERPL QL IA: NEGATIVE
SCREEN DRVVT: 28.3 SEC
SILICA CLOTTING TIME INTERPRETATION: NORMAL
SILICA CLOTTING TIME S/C: 0.78 RATIO
SODIUM SERPL-SCNC: 144 MMOL/L
SPECIFIC GRAVITY URINE: 1.01
T PALLIDUM AB SER QL IA: NEGATIVE
THYROGLOB AB SERPL-ACNC: <20 IU/ML
THYROPEROXIDASE AB SERPL IA-ACNC: 16.6 IU/ML
TSH SERPL-ACNC: 1.61 UIU/ML
UROBILINOGEN URINE: NORMAL
WBC # FLD AUTO: 7.1 K/UL

## 2020-12-07 ENCOUNTER — APPOINTMENT (OUTPATIENT)
Dept: CV DIAGNOSITCS | Facility: HOSPITAL | Age: 50
End: 2020-12-07

## 2020-12-07 LAB
24R-OH-CALCIDIOL SERPL-MCNC: 53.1 PG/ML
ACE BLD-CCNC: 37 U/L
ALDOLASE SERPL-CCNC: 4.3 U/L
ANA SER IF-ACNC: NEGATIVE
B BURGDOR AB SER-IMP: NEGATIVE
B BURGDOR IGM PATRN SER IB-IMP: NEGATIVE
B BURGDOR18KD IGG SER QL IB: NORMAL
B BURGDOR23KD IGG SER QL IB: NORMAL
B BURGDOR23KD IGM SER QL IB: NORMAL
B BURGDOR28KD IGG SER QL IB: NORMAL
B BURGDOR30KD IGG SER QL IB: NORMAL
B BURGDOR31KD IGG SER QL IB: PRESENT
B BURGDOR39KD IGG SER QL IB: NORMAL
B BURGDOR39KD IGM SER QL IB: NORMAL
B BURGDOR41KD IGG SER QL IB: NORMAL
B BURGDOR41KD IGM SER QL IB: NORMAL
B BURGDOR45KD IGG SER QL IB: NORMAL
B BURGDOR58KD IGG SER QL IB: NORMAL
B BURGDOR66KD IGG SER QL IB: NORMAL
B BURGDOR93KD IGG SER QL IB: NORMAL
B2 GLYCOPROT1 IGA SERPL IA-ACNC: <5 SAU
B2 GLYCOPROT1 IGG SER-ACNC: 10.8 SGU
B2 GLYCOPROT1 IGM SER-ACNC: <5 SMU
CARDIOLIPIN IGM SER-MCNC: 9 MPL
CARDIOLIPIN IGM SER-MCNC: <5 GPL
CCP AB SER IA-ACNC: <8 UNITS
CENTROMERE IGG SER-ACNC: <0.2 CD:130001892
DEPRECATED CARDIOLIPIN IGA SER: <5 APL
DSDNA AB SER-ACNC: <12 IU/ML
ENA JO1 AB SER IA-ACNC: <0.2 AL
ENA RNP AB SER IA-ACNC: <0.2 AL
ENA SCL70 IGG SER IA-ACNC: <0.2 AL
ENA SM AB SER IA-ACNC: <0.2 AL
ENA SS-A AB SER IA-ACNC: <0.2 AL
ENA SS-B AB SER IA-ACNC: <0.2 AL
M TB IFN-G BLD-IMP: NEGATIVE
PS IGA SER QL: <20 U/ML
PS IGG SER QL: <10 U/ML
PS IGM SER QL: <25 U/ML
QUANTIFERON TB PLUS MITOGEN MINUS NIL: 5.83 IU/ML
QUANTIFERON TB PLUS NIL: 0.01 IU/ML
QUANTIFERON TB PLUS TB1 MINUS NIL: 0 IU/ML
QUANTIFERON TB PLUS TB2 MINUS NIL: 0 IU/ML
RF+CCP IGG SER-IMP: NEGATIVE

## 2020-12-08 LAB
ALBUMIN MFR SERPL ELPH: 60.1 %
ALBUMIN SERPL-MCNC: 4.6 G/DL
ALBUMIN/GLOB SERPL: 1.5 RATIO
ALPHA1 GLOB MFR SERPL ELPH: 3.7 %
ALPHA1 GLOB SERPL ELPH-MCNC: 0.3 G/DL
ALPHA2 GLOB MFR SERPL ELPH: 10.3 %
ALPHA2 GLOB SERPL ELPH-MCNC: 0.8 G/DL
B-GLOBULIN MFR SERPL ELPH: 12.6 %
B-GLOBULIN SERPL ELPH-MCNC: 1 G/DL
G6PD SER-CCNC: 17.5 U/G HGB
GAMMA GLOB FLD ELPH-MCNC: 1 G/DL
GAMMA GLOB MFR SERPL ELPH: 13.3 %
IL6 SERPL-MCNC: <2.5 PG/ML
INTERPRETATION SERPL IEP-IMP: NORMAL
PROT SERPL-MCNC: 7.6 G/DL
PROT SERPL-MCNC: 7.6 G/DL

## 2020-12-11 LAB — RNA POLYMERASE III IGG: 4 UNITS

## 2020-12-15 ENCOUNTER — NON-APPOINTMENT (OUTPATIENT)
Age: 50
End: 2020-12-15

## 2020-12-15 LAB
EJ AB SER QL: NEGATIVE
ENA JO1 AB SER IA-ACNC: <20 UNITS
ENA PM/SCL AB SER-ACNC: <20 UNITS
ENA SM+RNP AB SER IA-ACNC: <20 UNITS
ENA SS-A IGG SER QL: <20 UNITS
FIBRILLARIN AB SER QL: NEGATIVE
KU AB SER QL: NEGATIVE
MDA-5 (P140)(CADM-140): <20 UNITS
MI2 AB SER QL: NEGATIVE
NXP-2 (P140): <20 UNITS
OJ AB SER QL: NEGATIVE
PL12 AB SER QL: NEGATIVE
PL7 AB SER QL: NEGATIVE
SRP AB SERPL QL: NEGATIVE
TIF GAMMA (P155/140): <20 UNITS
U2 SNRNP AB SER QL: NEGATIVE

## 2020-12-22 ENCOUNTER — APPOINTMENT (OUTPATIENT)
Dept: CARDIOLOGY | Facility: CLINIC | Age: 50
End: 2020-12-22
Payer: COMMERCIAL

## 2020-12-22 ENCOUNTER — OUTPATIENT (OUTPATIENT)
Dept: OUTPATIENT SERVICES | Facility: HOSPITAL | Age: 50
LOS: 1 days | End: 2020-12-22

## 2020-12-22 ENCOUNTER — APPOINTMENT (OUTPATIENT)
Dept: CV DIAGNOSITCS | Facility: HOSPITAL | Age: 50
End: 2020-12-22
Payer: COMMERCIAL

## 2020-12-22 VITALS
WEIGHT: 156 LBS | HEIGHT: 66 IN | TEMPERATURE: 96.6 F | DIASTOLIC BLOOD PRESSURE: 91 MMHG | BODY MASS INDEX: 25.07 KG/M2 | OXYGEN SATURATION: 100 % | HEART RATE: 74 BPM | SYSTOLIC BLOOD PRESSURE: 156 MMHG

## 2020-12-22 DIAGNOSIS — I30.0 ACUTE NONSPECIFIC IDIOPATHIC PERICARDITIS: ICD-10-CM

## 2020-12-22 DIAGNOSIS — Z90.49 ACQUIRED ABSENCE OF OTHER SPECIFIED PARTS OF DIGESTIVE TRACT: Chronic | ICD-10-CM

## 2020-12-22 PROCEDURE — 93000 ELECTROCARDIOGRAM COMPLETE: CPT

## 2020-12-22 PROCEDURE — 93306 TTE W/DOPPLER COMPLETE: CPT | Mod: 26

## 2020-12-22 PROCEDURE — 99072 ADDL SUPL MATRL&STAF TM PHE: CPT

## 2020-12-22 PROCEDURE — 99214 OFFICE O/P EST MOD 30 MIN: CPT

## 2020-12-31 ENCOUNTER — APPOINTMENT (OUTPATIENT)
Dept: CARDIOLOGY | Facility: CLINIC | Age: 50
End: 2020-12-31

## 2021-01-11 ENCOUNTER — NON-APPOINTMENT (OUTPATIENT)
Age: 51
End: 2021-01-11

## 2021-01-11 ENCOUNTER — APPOINTMENT (OUTPATIENT)
Dept: INTERNAL MEDICINE | Facility: CLINIC | Age: 51
End: 2021-01-11
Payer: COMMERCIAL

## 2021-01-11 VITALS
WEIGHT: 153 LBS | HEART RATE: 82 BPM | RESPIRATION RATE: 17 BRPM | OXYGEN SATURATION: 98 % | DIASTOLIC BLOOD PRESSURE: 91 MMHG | HEIGHT: 66 IN | SYSTOLIC BLOOD PRESSURE: 166 MMHG | TEMPERATURE: 97.2 F | BODY MASS INDEX: 24.59 KG/M2

## 2021-01-11 VITALS — DIASTOLIC BLOOD PRESSURE: 80 MMHG | SYSTOLIC BLOOD PRESSURE: 124 MMHG

## 2021-01-11 VITALS — DIASTOLIC BLOOD PRESSURE: 94 MMHG | SYSTOLIC BLOOD PRESSURE: 150 MMHG

## 2021-01-11 DIAGNOSIS — M25.60 STIFFNESS OF UNSPECIFIED JOINT, NOT ELSEWHERE CLASSIFIED: ICD-10-CM

## 2021-01-11 PROCEDURE — 99072 ADDL SUPL MATRL&STAF TM PHE: CPT

## 2021-01-11 PROCEDURE — 99214 OFFICE O/P EST MOD 30 MIN: CPT | Mod: 25

## 2021-01-11 PROCEDURE — 93000 ELECTROCARDIOGRAM COMPLETE: CPT

## 2021-01-11 RX ORDER — METOPROLOL SUCCINATE 25 MG/1
25 TABLET, EXTENDED RELEASE ORAL
Qty: 30 | Refills: 5 | Status: DISCONTINUED | COMMUNITY
Start: 2020-12-16 | End: 2021-01-11

## 2021-01-12 NOTE — PHYSICAL EXAM
[General Appearance - Well Developed] : well developed [Normal Appearance] : normal appearance [Well Groomed] : well groomed [General Appearance - Well Nourished] : well nourished [No Deformities] : no deformities [General Appearance - In No Acute Distress] : no acute distress [Normal Conjunctiva] : the conjunctiva exhibited no abnormalities [Eyelids - No Xanthelasma] : the eyelids demonstrated no xanthelasmas [Normal Oral Mucosa] : normal oral mucosa [No Oral Pallor] : no oral pallor [No Oral Cyanosis] : no oral cyanosis [Normal Jugular Venous A Waves Present] : normal jugular venous A waves present [Normal Jugular Venous V Waves Present] : normal jugular venous V waves present [No Jugular Venous Richardson A Waves] : no jugular venous richardson A waves [Respiration, Rhythm And Depth] : normal respiratory rhythm and effort [Exaggerated Use Of Accessory Muscles For Inspiration] : no accessory muscle use [Auscultation Breath Sounds / Voice Sounds] : lungs were clear to auscultation bilaterally [Normal Rate] : normal [Rhythm Regular] : regular [Normal S1] : normal S1 [Normal S2] : normal S2 [No Murmur] : no murmurs heard [Abdomen Soft] : soft [Abdomen Tenderness] : non-tender [Abdomen Mass (___ Cm)] : no abdominal mass palpated [Abnormal Walk] : normal gait [Gait - Sufficient For Exercise Testing] : the gait was sufficient for exercise testing [Nail Clubbing] : no clubbing of the fingernails [Cyanosis, Localized] : no localized cyanosis [Petechial Hemorrhages (___cm)] : no petechial hemorrhages [Skin Color & Pigmentation] : normal skin color and pigmentation [] : no rash [No Venous Stasis] : no venous stasis [Skin Lesions] : no skin lesions [No Skin Ulcers] : no skin ulcer [No Xanthoma] : no  xanthoma was observed [Oriented To Time, Place, And Person] : oriented to person, place, and time [Affect] : the affect was normal [Mood] : the mood was normal [No Anxiety] : not feeling anxious

## 2021-02-08 ENCOUNTER — NON-APPOINTMENT (OUTPATIENT)
Age: 51
End: 2021-02-08

## 2021-02-08 ENCOUNTER — APPOINTMENT (OUTPATIENT)
Dept: CARDIOLOGY | Facility: CLINIC | Age: 51
End: 2021-02-08
Payer: COMMERCIAL

## 2021-02-08 ENCOUNTER — APPOINTMENT (OUTPATIENT)
Dept: CV DIAGNOSITCS | Facility: HOSPITAL | Age: 51
End: 2021-02-08

## 2021-02-08 ENCOUNTER — OUTPATIENT (OUTPATIENT)
Dept: OUTPATIENT SERVICES | Facility: HOSPITAL | Age: 51
LOS: 1 days | End: 2021-02-08
Payer: COMMERCIAL

## 2021-02-08 VITALS — SYSTOLIC BLOOD PRESSURE: 150 MMHG | DIASTOLIC BLOOD PRESSURE: 85 MMHG | OXYGEN SATURATION: 100 % | HEART RATE: 91 BPM

## 2021-02-08 DIAGNOSIS — R00.2 PALPITATIONS: ICD-10-CM

## 2021-02-08 DIAGNOSIS — Z90.49 ACQUIRED ABSENCE OF OTHER SPECIFIED PARTS OF DIGESTIVE TRACT: Chronic | ICD-10-CM

## 2021-02-08 PROCEDURE — 99072 ADDL SUPL MATRL&STAF TM PHE: CPT

## 2021-02-08 PROCEDURE — 93306 TTE W/DOPPLER COMPLETE: CPT | Mod: 26

## 2021-02-08 PROCEDURE — 93000 ELECTROCARDIOGRAM COMPLETE: CPT

## 2021-02-08 PROCEDURE — 99214 OFFICE O/P EST MOD 30 MIN: CPT

## 2021-02-08 PROCEDURE — 93306 TTE W/DOPPLER COMPLETE: CPT

## 2021-02-08 NOTE — PHYSICAL EXAM
[General Appearance - Well Developed] : well developed [Normal Appearance] : normal appearance [Well Groomed] : well groomed [General Appearance - Well Nourished] : well nourished [No Deformities] : no deformities [General Appearance - In No Acute Distress] : no acute distress [Normal Conjunctiva] : the conjunctiva exhibited no abnormalities [Eyelids - No Xanthelasma] : the eyelids demonstrated no xanthelasmas [Normal Oral Mucosa] : normal oral mucosa [No Oral Pallor] : no oral pallor [No Oral Cyanosis] : no oral cyanosis [Normal Jugular Venous A Waves Present] : normal jugular venous A waves present [Normal Jugular Venous V Waves Present] : normal jugular venous V waves present [No Jugular Venous Richardson A Waves] : no jugular venous richardson A waves [Respiration, Rhythm And Depth] : normal respiratory rhythm and effort [Exaggerated Use Of Accessory Muscles For Inspiration] : no accessory muscle use [Auscultation Breath Sounds / Voice Sounds] : lungs were clear to auscultation bilaterally [Abdomen Soft] : soft [Abdomen Tenderness] : non-tender [Abdomen Mass (___ Cm)] : no abdominal mass palpated [Abnormal Walk] : normal gait [Gait - Sufficient For Exercise Testing] : the gait was sufficient for exercise testing [Nail Clubbing] : no clubbing of the fingernails [Cyanosis, Localized] : no localized cyanosis [Petechial Hemorrhages (___cm)] : no petechial hemorrhages [Skin Color & Pigmentation] : normal skin color and pigmentation [] : no rash [No Venous Stasis] : no venous stasis [Skin Lesions] : no skin lesions [No Skin Ulcers] : no skin ulcer [No Xanthoma] : no  xanthoma was observed [Oriented To Time, Place, And Person] : oriented to person, place, and time [Affect] : the affect was normal [Mood] : the mood was normal [No Anxiety] : not feeling anxious [Normal Rate] : normal [Rhythm Regular] : regular [Normal S1] : normal S1 [No Murmur] : no murmurs heard [Normal S2] : normal S2

## 2021-02-08 NOTE — PHYSICAL EXAM
[General Appearance - Well Developed] : well developed [Normal Appearance] : normal appearance [Well Groomed] : well groomed [General Appearance - Well Nourished] : well nourished [No Deformities] : no deformities [General Appearance - In No Acute Distress] : no acute distress [Normal Conjunctiva] : the conjunctiva exhibited no abnormalities [Eyelids - No Xanthelasma] : the eyelids demonstrated no xanthelasmas [Normal Oral Mucosa] : normal oral mucosa [No Oral Pallor] : no oral pallor [No Oral Cyanosis] : no oral cyanosis [Normal Jugular Venous A Waves Present] : normal jugular venous A waves present [Normal Jugular Venous V Waves Present] : normal jugular venous V waves present [No Jugular Venous Richardson A Waves] : no jugular venous richardson A waves [Respiration, Rhythm And Depth] : normal respiratory rhythm and effort [Exaggerated Use Of Accessory Muscles For Inspiration] : no accessory muscle use [Auscultation Breath Sounds / Voice Sounds] : lungs were clear to auscultation bilaterally [Abdomen Soft] : soft [Abdomen Tenderness] : non-tender [Abdomen Mass (___ Cm)] : no abdominal mass palpated [Abnormal Walk] : normal gait [Gait - Sufficient For Exercise Testing] : the gait was sufficient for exercise testing [Nail Clubbing] : no clubbing of the fingernails [Cyanosis, Localized] : no localized cyanosis [Petechial Hemorrhages (___cm)] : no petechial hemorrhages [Skin Color & Pigmentation] : normal skin color and pigmentation [] : no rash [No Venous Stasis] : no venous stasis [Skin Lesions] : no skin lesions [No Skin Ulcers] : no skin ulcer [No Xanthoma] : no  xanthoma was observed [Oriented To Time, Place, And Person] : oriented to person, place, and time [Affect] : the affect was normal [Mood] : the mood was normal [No Anxiety] : not feeling anxious [Normal Rate] : normal [Rhythm Regular] : regular [Normal S1] : normal S1 [Normal S2] : normal S2 [No Murmur] : no murmurs heard

## 2021-02-09 ENCOUNTER — APPOINTMENT (OUTPATIENT)
Dept: ELECTROPHYSIOLOGY | Facility: CLINIC | Age: 51
End: 2021-02-09
Payer: COMMERCIAL

## 2021-02-09 VITALS
SYSTOLIC BLOOD PRESSURE: 146 MMHG | OXYGEN SATURATION: 100 % | HEART RATE: 87 BPM | HEIGHT: 66 IN | WEIGHT: 153 LBS | DIASTOLIC BLOOD PRESSURE: 88 MMHG | BODY MASS INDEX: 24.59 KG/M2

## 2021-02-09 PROCEDURE — 99072 ADDL SUPL MATRL&STAF TM PHE: CPT

## 2021-02-09 PROCEDURE — 93000 ELECTROCARDIOGRAM COMPLETE: CPT

## 2021-02-09 PROCEDURE — 99215 OFFICE O/P EST HI 40 MIN: CPT

## 2021-02-09 PROCEDURE — 99205 OFFICE O/P NEW HI 60 MIN: CPT

## 2021-02-09 RX ORDER — CHLORHEXIDINE GLUCONATE, 0.12% ORAL RINSE 1.2 MG/ML
0.12 SOLUTION DENTAL
Qty: 473 | Refills: 0 | Status: DISCONTINUED | COMMUNITY
Start: 2020-11-04

## 2021-02-09 RX ORDER — IBUPROFEN 600 MG/1
600 TABLET, FILM COATED ORAL
Qty: 30 | Refills: 0 | Status: DISCONTINUED | COMMUNITY
Start: 2020-11-04

## 2021-02-09 RX ORDER — METOPROLOL TARTRATE 25 MG/1
25 TABLET, FILM COATED ORAL TWICE DAILY
Qty: 30 | Refills: 3 | Status: DISCONTINUED | COMMUNITY
Start: 2020-12-16 | End: 2021-02-09

## 2021-02-09 RX ORDER — AMOXICILLIN 875 MG/1
875 TABLET, FILM COATED ORAL
Qty: 14 | Refills: 0 | Status: DISCONTINUED | COMMUNITY
Start: 2020-11-04

## 2021-02-09 RX ORDER — ERGOCALCIFEROL 1.25 MG/1
1.25 MG CAPSULE, LIQUID FILLED ORAL
Qty: 12 | Refills: 0 | Status: DISCONTINUED | COMMUNITY
Start: 2020-10-28 | End: 2021-02-09

## 2021-02-09 RX ORDER — ACETAMINOPHEN AND CODEINE 300; 30 MG/1; MG/1
300-30 TABLET ORAL
Qty: 12 | Refills: 0 | Status: DISCONTINUED | COMMUNITY
Start: 2020-11-04

## 2021-02-09 RX ORDER — CYCLOBENZAPRINE HYDROCHLORIDE 10 MG/1
10 TABLET, FILM COATED ORAL
Qty: 7 | Refills: 0 | Status: DISCONTINUED | COMMUNITY
Start: 2020-11-04

## 2021-02-09 NOTE — ASSESSMENT
[FreeTextEntry1] : 50 year old female with idiopathic pericardial effusion -\par \par  Today's echo shows continued decrease in size of effusion. No clear etiology and not able to tolerate colchicine.pleased with continued resolution.\par \par SVT - documented on monitor. Discussed the fact that these symptoms preceded her effusion and scheduled consult with Dr Nithin Desai,\par \par Livedo - follow up w Dr Weaver. Will consider cosult with Dr Berlin Chacon , as well.

## 2021-02-09 NOTE — HISTORY OF PRESENT ILLNESS
[FreeTextEntry1] :  Ms. Pickering is a pleasant 50 year old female presented for follow up of large pericardial effusion associated with palpitations.  that was drained a few weeks ago. \par  On Nov 13th 2020, based on ecbho findings per both Lalita and Leland reported large effusion. She was admitted to ED,  transferred to cath lab for drainage and was found o have less fluid ( not large enough to tap). She was admitted overnight for observation and d/patricia on colchicine.\par She was readmitted to ER on November 20, 2020 for rapid heart rates and ongoing pericardial effusion of unknown etiology. Patient remained at [place] [facility] on telemetry from 11/20- 11/23. No evidence of tachycardia was identified. Images reviewed by IR and CT surgery. Both divisions felt that there was insufficient fluid to tap. \par \par Of note, she had undergone malignancy scans of her chest, abdomen and pelvis which were all negative including Rheum evaluation so far. \par \par Discussed biotel finding of SVT ( spoke w dr james) he believes likely PV tach. Suggested BBl or consideration for ablation. We discussed different options and she would like to try " pill in pocket" with short acting BBL only when needed.\par \par Patient seen and examined. feels well. \par \par Interval Note\par February 8, 2021\par \par Patient returns for a follow up visit. Blood pressure today is elevated 150/85. EKG- sinus rhythm. Repeat echo today.\par \par She has been monitoring her BP at home and the only elevated readings were at doctor appts! \par \par  She continues to have brief episodes of elevated HR on APPLE watch but no prolonged very rapid episodes. Saw IM for check up. Has not yet returned to Rheum.\par \par Repeat echocardiogram today which demonstrated:\par LVEF 63%\par Normal LV function\par Moderate pericardial effusion\par (1 ) cm posteriorly to left ventricle\par (2) cm adjacent to the right atrium\par Trace effusion adjacent to the right ventricle\par No evidence of pericardial tamponade\par \par \par This echo has been compared to prior studies on December 22, 2020 and November 30, 2020\par \par Effusion appears stable and some mild decrease in size.

## 2021-02-09 NOTE — HISTORY OF PRESENT ILLNESS
[FreeTextEntry1] :  Ms. Pickering is a pleasant 50 year old female presented for follow up of large pericardial effusion associated with palpitations.  that was drained a few weeks ago. \par  On Nov 13th 2020, based on ecbho findings per both Lalita and Leland reported large effusion. She was admitted to ED,  transferred to cath lab for drainage and was found o have less fluid ( not large enough to tap). She was admitted overnight for observation and d/patricia on colchicine.\par She was readmitted to ER on November 20, 2020 for rapid heart rates and ongoing pericardial effusion of unknown etiology. Patient remained at [facility] on telemetry from 11/20- 11/23. No evidence of tachycardia was identified. Images reviewed by IR and CT surgery. Both divisions felt that there was insufficient fluid to tap. \par \par Of note, she had undergone malignancy scans of her chest, abdomen and pelvis which were all negative including Rheum evaluation so far. \par \par Discussed biotel finding of SVT ( spoke w dr james) he believes likely PV tach. Suggested BBl or consideration for ablation. We discussed different options and she would like to try " pill in pocket" with short acting BBL only when needed.\par \par Patient seen and examined. feels well. \par \par Interval Note\par February 8, 2021\par \par Patient returns for a follow up visit. Blood pressure today is elevated 150/85. EKG- sinus rhythm. Repeat echo today.\par \par She has been monitoring her BP at home and the only elevated readings were at doctor appts! \par \par  She continues to have brief episodes of elevated HR on APPLE watch but no prolonged very rapid episodes. Saw IM for check up. Has not yet returned to Rheum.\par \par Repeat echocardiogram today which demonstrated:\par LVEF 63%\par Normal LV function\par Moderate pericardial effusion\par (1 ) cm posteriorly to left ventricle\par (2) cm adjacent to the right atrium\par Trace effusion adjacent to the right ventricle\par No evidence of pericardial tamponade\par \par \par This echo has been compared to prior studies on December 22, 2020 and November 30, 2020\par \par Effusion appears stable and some mild decrease in size.

## 2021-02-11 NOTE — DISCUSSION/SUMMARY
[FreeTextEntry1] : Ms. Pickering is a 50 year old female with long history of palpitations in the setting of structurally normal heart. Recently diagnosed with idiopathic pericardial effusion, which is resolving. The event monitor revealed atrial tachycardia. We discussed the option regarding management, including medical therapy such as BB or the option of an EP studies with an aim at ablation. She is rather in favor of trying a low dose BB and would like to reconsider for ablation pending burden. We also discussed lifestyle modifications, including increased fluid and salt intake and avoidance of caffeine. We will re-evaluate in 3 months. \par

## 2021-02-11 NOTE — PHYSICAL EXAM
[General Appearance - Well Developed] : well developed [Normal Appearance] : normal appearance [Well Groomed] : well groomed [General Appearance - Well Nourished] : well nourished [No Deformities] : no deformities [General Appearance - In No Acute Distress] : no acute distress [Normal Conjunctiva] : the conjunctiva exhibited no abnormalities [Eyelids - No Xanthelasma] : the eyelids demonstrated no xanthelasmas [Normal Oral Mucosa] : normal oral mucosa [No Oral Pallor] : no oral pallor [No Oral Cyanosis] : no oral cyanosis [Normal Jugular Venous A Waves Present] : normal jugular venous A waves present [Normal Jugular Venous V Waves Present] : normal jugular venous V waves present [No Jugular Venous Richardson A Waves] : no jugular venous richardson A waves [Heart Rate And Rhythm] : heart rate and rhythm were normal [Heart Sounds] : normal S1 and S2 [Murmurs] : no murmurs present [Respiration, Rhythm And Depth] : normal respiratory rhythm and effort [Exaggerated Use Of Accessory Muscles For Inspiration] : no accessory muscle use [Auscultation Breath Sounds / Voice Sounds] : lungs were clear to auscultation bilaterally [Abdomen Soft] : soft [Abdomen Tenderness] : non-tender [Abdomen Mass (___ Cm)] : no abdominal mass palpated [Abnormal Walk] : normal gait [Gait - Sufficient For Exercise Testing] : the gait was sufficient for exercise testing [Nail Clubbing] : no clubbing of the fingernails [Cyanosis, Localized] : no localized cyanosis [Petechial Hemorrhages (___cm)] : no petechial hemorrhages [Skin Color & Pigmentation] : normal skin color and pigmentation [] : no rash [No Venous Stasis] : no venous stasis [Skin Lesions] : no skin lesions [No Skin Ulcers] : no skin ulcer [No Xanthoma] : no  xanthoma was observed [Oriented To Time, Place, And Person] : oriented to person, place, and time [Affect] : the affect was normal [Mood] : the mood was normal [No Anxiety] : not feeling anxious

## 2021-02-11 NOTE — HISTORY OF PRESENT ILLNESS
[FreeTextEntry1] : Ms. Pickering is a pleasant 50 year old female presenting today for evaluation of palpitations (SVT). The patient reports that she has been having palpitations for the last 8 years on and off. The episodes were very infrequent and each episode lasted few minutes. However, since August 2020, the palpitations have increased in frequency, duration and intensity. Each episode could last for minutes to an hour at times. She was seen by Dr. Mittal for evaluation and underwent stress ECHO on Nov 13th, 2020. The echo finding reported large effusion and was admitted to ED, transferred to cath lab for drainage and was found to have less fluid (not large enough to tap). She was admitted overnight and discharged on colchicine. \par \par She was readmitted to ER on November 20, 2020 for rapid heart rates and ongoing pericardial effusion of unknown etiology. Patient remained at [place] on telemetry from 11/20- 11/23. No evidence of tachycardia was identified. Images reviewed by IR and CT surgery. Both divisions felt that there was insufficient fluid to tap. She also underwent malignancy scans of her chest, abdomen and pelvis, which were all negative including rheumatic evaluation. \par \par Post hospitalization she wore a 14 days event monitor (StyleSeat), which revealed episodes of SVT/AT, likely PV tachycardia and was suggested low dose BB for symptom relief, which she deferred. She wore another event monitor in December 2020, which didn’t reveal any SVT and had a APC burden of 1%. \par \par Overall she feels well now. She reports having rapid heart beat at night as indicated by her Apple watch but doesn’t disrupt her sleep. She has also noted that bending over triggers her palpitations. Relaxing and drinking water alleviates her symptoms. She has also noted her heart rate lowering to 40s bpm. Denies any c/o chest pain, SOB, lightheadedness, dizziness or syncope. She admits that her hydration is not adequate. Minimal caffeine intake and denies any alcohol. She stays fairly active and has been able to tolerate her activities without any limitations. \par

## 2021-02-25 ENCOUNTER — APPOINTMENT (OUTPATIENT)
Dept: CARDIOLOGY | Facility: CLINIC | Age: 51
End: 2021-02-25
Payer: COMMERCIAL

## 2021-02-25 VITALS
SYSTOLIC BLOOD PRESSURE: 133 MMHG | RESPIRATION RATE: 16 BRPM | OXYGEN SATURATION: 100 % | HEART RATE: 79 BPM | DIASTOLIC BLOOD PRESSURE: 86 MMHG | WEIGHT: 161 LBS | BODY MASS INDEX: 25.88 KG/M2 | HEIGHT: 66 IN

## 2021-02-25 DIAGNOSIS — R23.1 PALLOR: ICD-10-CM

## 2021-02-25 PROCEDURE — 99072 ADDL SUPL MATRL&STAF TM PHE: CPT

## 2021-02-25 PROCEDURE — 99215 OFFICE O/P EST HI 40 MIN: CPT

## 2021-02-25 PROCEDURE — 99205 OFFICE O/P NEW HI 60 MIN: CPT

## 2021-03-02 ENCOUNTER — NON-APPOINTMENT (OUTPATIENT)
Age: 51
End: 2021-03-02

## 2021-03-09 PROBLEM — R23.1 LIVEDO RETICULARIS: Status: ACTIVE | Noted: 2020-12-03

## 2021-03-09 NOTE — PHYSICAL EXAM
[General Appearance - Well Developed] : well developed [Normal Appearance] : normal appearance [Well Groomed] : well groomed [General Appearance - Well Nourished] : well nourished [No Deformities] : no deformities [General Appearance - In No Acute Distress] : no acute distress [Normal Conjunctiva] : the conjunctiva exhibited no abnormalities [Eyelids - No Xanthelasma] : the eyelids demonstrated no xanthelasmas [Normal Oral Mucosa] : normal oral mucosa [No Oral Pallor] : no oral pallor [No Oral Cyanosis] : no oral cyanosis [Normal Jugular Venous A Waves Present] : normal jugular venous A waves present [Normal Jugular Venous V Waves Present] : normal jugular venous V waves present [No Jugular Venous Richardson A Waves] : no jugular venous richardson A waves [Heart Rate And Rhythm] : heart rate and rhythm were normal [Heart Sounds] : normal S1 and S2 [Murmurs] : no murmurs present [Respiration, Rhythm And Depth] : normal respiratory rhythm and effort [Exaggerated Use Of Accessory Muscles For Inspiration] : no accessory muscle use [Auscultation Breath Sounds / Voice Sounds] : lungs were clear to auscultation bilaterally [Abdomen Soft] : soft [Abdomen Tenderness] : non-tender [Abdomen Mass (___ Cm)] : no abdominal mass palpated [Abnormal Walk] : normal gait [Gait - Sufficient For Exercise Testing] : the gait was sufficient for exercise testing [Nail Clubbing] : no clubbing of the fingernails [] : no rash [Skin Lesions] : no skin lesions [No Skin Ulcers] : no skin ulcer [Oriented To Time, Place, And Person] : oriented to person, place, and time [Affect] : the affect was normal [Mood] : the mood was normal [No Anxiety] : not feeling anxious [FreeTextEntry1] : As above

## 2021-03-17 ENCOUNTER — OUTPATIENT (OUTPATIENT)
Dept: OUTPATIENT SERVICES | Facility: HOSPITAL | Age: 51
LOS: 1 days | End: 2021-03-17
Payer: COMMERCIAL

## 2021-03-17 ENCOUNTER — APPOINTMENT (OUTPATIENT)
Dept: ULTRASOUND IMAGING | Facility: IMAGING CENTER | Age: 51
End: 2021-03-17
Payer: COMMERCIAL

## 2021-03-17 ENCOUNTER — APPOINTMENT (OUTPATIENT)
Dept: MAMMOGRAPHY | Facility: IMAGING CENTER | Age: 51
End: 2021-03-17
Payer: COMMERCIAL

## 2021-03-17 ENCOUNTER — APPOINTMENT (OUTPATIENT)
Dept: RADIOLOGY | Facility: IMAGING CENTER | Age: 51
End: 2021-03-17
Payer: COMMERCIAL

## 2021-03-17 DIAGNOSIS — Z90.49 ACQUIRED ABSENCE OF OTHER SPECIFIED PARTS OF DIGESTIVE TRACT: Chronic | ICD-10-CM

## 2021-03-17 DIAGNOSIS — Z00.8 ENCOUNTER FOR OTHER GENERAL EXAMINATION: ICD-10-CM

## 2021-03-17 PROCEDURE — 76641 ULTRASOUND BREAST COMPLETE: CPT | Mod: 26,50

## 2021-03-17 PROCEDURE — 77063 BREAST TOMOSYNTHESIS BI: CPT

## 2021-03-17 PROCEDURE — 77067 SCR MAMMO BI INCL CAD: CPT | Mod: 26

## 2021-03-17 PROCEDURE — 77063 BREAST TOMOSYNTHESIS BI: CPT | Mod: 26

## 2021-03-17 PROCEDURE — 77080 DXA BONE DENSITY AXIAL: CPT | Mod: 26

## 2021-03-17 PROCEDURE — 77067 SCR MAMMO BI INCL CAD: CPT

## 2021-03-17 PROCEDURE — 77080 DXA BONE DENSITY AXIAL: CPT

## 2021-03-17 PROCEDURE — 76641 ULTRASOUND BREAST COMPLETE: CPT

## 2021-04-08 ENCOUNTER — NON-APPOINTMENT (OUTPATIENT)
Age: 51
End: 2021-04-08

## 2021-04-28 ENCOUNTER — OUTPATIENT (OUTPATIENT)
Dept: OUTPATIENT SERVICES | Facility: HOSPITAL | Age: 51
LOS: 1 days | End: 2021-04-28
Payer: COMMERCIAL

## 2021-04-28 ENCOUNTER — APPOINTMENT (OUTPATIENT)
Dept: CV DIAGNOSITCS | Facility: HOSPITAL | Age: 51
End: 2021-04-28

## 2021-04-28 DIAGNOSIS — Z90.49 ACQUIRED ABSENCE OF OTHER SPECIFIED PARTS OF DIGESTIVE TRACT: Chronic | ICD-10-CM

## 2021-04-28 DIAGNOSIS — I25.10 ATHEROSCLEROTIC HEART DISEASE OF NATIVE CORONARY ARTERY WITHOUT ANGINA PECTORIS: ICD-10-CM

## 2021-04-28 PROCEDURE — 93306 TTE W/DOPPLER COMPLETE: CPT | Mod: 26

## 2021-04-28 PROCEDURE — 93306 TTE W/DOPPLER COMPLETE: CPT

## 2021-05-03 ENCOUNTER — APPOINTMENT (OUTPATIENT)
Dept: CARDIOLOGY | Facility: CLINIC | Age: 51
End: 2021-05-03
Payer: COMMERCIAL

## 2021-05-03 VITALS
BODY MASS INDEX: 25.07 KG/M2 | OXYGEN SATURATION: 99 % | SYSTOLIC BLOOD PRESSURE: 134 MMHG | HEART RATE: 86 BPM | DIASTOLIC BLOOD PRESSURE: 86 MMHG | WEIGHT: 156 LBS | HEIGHT: 66 IN

## 2021-05-03 DIAGNOSIS — I73.9 PERIPHERAL VASCULAR DISEASE, UNSPECIFIED: ICD-10-CM

## 2021-05-03 PROCEDURE — 93000 ELECTROCARDIOGRAM COMPLETE: CPT

## 2021-05-03 PROCEDURE — 99215 OFFICE O/P EST HI 40 MIN: CPT

## 2021-05-03 PROCEDURE — 99072 ADDL SUPL MATRL&STAF TM PHE: CPT

## 2021-05-03 NOTE — PHYSICAL EXAM
[General Appearance - Well Developed] : well developed [Normal Appearance] : normal appearance [Well Groomed] : well groomed [General Appearance - Well Nourished] : well nourished [No Deformities] : no deformities [General Appearance - In No Acute Distress] : no acute distress [Normal Conjunctiva] : the conjunctiva exhibited no abnormalities [Eyelids - No Xanthelasma] : the eyelids demonstrated no xanthelasmas [Normal Oral Mucosa] : normal oral mucosa [No Oral Pallor] : no oral pallor [No Oral Cyanosis] : no oral cyanosis [Normal Jugular Venous A Waves Present] : normal jugular venous A waves present [Normal Jugular Venous V Waves Present] : normal jugular venous V waves present [No Jugular Venous Richardson A Waves] : no jugular venous richardson A waves [Respiration, Rhythm And Depth] : normal respiratory rhythm and effort [Exaggerated Use Of Accessory Muscles For Inspiration] : no accessory muscle use [Auscultation Breath Sounds / Voice Sounds] : lungs were clear to auscultation bilaterally [Abdomen Soft] : soft [Abdomen Tenderness] : non-tender [Abdomen Mass (___ Cm)] : no abdominal mass palpated [Abnormal Walk] : normal gait [Gait - Sufficient For Exercise Testing] : the gait was sufficient for exercise testing [Nail Clubbing] : no clubbing of the fingernails [Cyanosis, Localized] : no localized cyanosis [Petechial Hemorrhages (___cm)] : no petechial hemorrhages [] : no rash [Skin Color & Pigmentation] : normal skin color and pigmentation [No Venous Stasis] : no venous stasis [Skin Lesions] : no skin lesions [No Skin Ulcers] : no skin ulcer [No Xanthoma] : no  xanthoma was observed [Oriented To Time, Place, And Person] : oriented to person, place, and time [Affect] : the affect was normal [Mood] : the mood was normal [No Anxiety] : not feeling anxious [Normal Rate] : normal [Rhythm Regular] : regular [Normal S1] : normal S1 [Normal S2] : normal S2 [No Murmur] : no murmurs heard

## 2021-05-04 PROBLEM — I73.9 PERIPHERAL ARTERY VASOSPASM: Status: ACTIVE | Noted: 2021-03-09

## 2021-05-04 NOTE — ASSESSMENT
[FreeTextEntry1] : 50 year old female with working diagnosis of idiopathic pericardial effusion and possible Raynaud's syndrome.\par \par  Today's echo shows stable, but unchanged moderate pericardial effusion.\par Patient has been evaluated by rheumatology, primary care, vascular, CT surgery and interventional radiology to \par assess for an underlying cause of her chronic effusion.\par \par To-date, no diagnosis has been declared.\par \par Reviewed Dr. Tomás Chacon's office note regarding patient's peripheral vascular issues.\par Will follow\par \par Spent 40 minutes, answering patient questions and concerns. Discussed several options with regards to next steps for analysis and intervention.\par \par PLAN\par Requesting a reassessment by CT surgeon, Dr. Elizabeth Pringle to determine if a small aspiration of effusion cells can be drawn to evaluate and rule out an oncological issue.\par \par Likely will need to repeat CT of the Chest without contrast for the most up-to-date assessment.\par \par Will follow up after Dr. Pringle's reevaluation.

## 2021-05-04 NOTE — HISTORY OF PRESENT ILLNESS
[FreeTextEntry1] :  Ms. Pickering is a pleasant 50 year old female presented for follow up of large pericardial effusion associated with palpitations.  that was drained a few weeks ago. \par  On Nov 13th 2020, based on echo findings per both [facility] and [facility] reported large effusion. She was admitted to ED,  transferred to cath lab for drainage and was found o have less fluid ( not large enough to tap). She was admitted overnight for observation and d/patricia on colchicine.\par She was readmitted to ER on November 20, 2020 for rapid heart rates and ongoing pericardial effusion of unknown etiology. Patient remained at [place] [facility] on telemetry from 11/20- 11/23. No evidence of tachycardia was identified. Images reviewed by IR and CT surgery. Both divisions felt that there was insufficient fluid to tap. \par \par Of note, she had undergone malignancy scans of her chest, abdomen and pelvis which were all negative including Rheum evaluation so far. \par \par Discussed biotel finding of SVT ( spoke w dr Desai) he believes likely PV tach. Suggested BBl or consideration for ablation. We discussed different options and she would like to try " pill in pocket" with short acting BBL only when needed.\par \par Patient seen and examined. feels well. \par \par Interval Note\par February 8, 2021\par \par Patient returns for a follow up visit. Blood pressure today is elevated 150/85. EKG- sinus rhythm. Repeat echo today.\par \par She has been monitoring her BP at home and the only elevated readings were at doctor appts! \par \par  She continues to have brief episodes of elevated HR on APPLE watch but no prolonged very rapid episodes. Saw IM for check up. Has not yet returned to Rheum.\par \par Repeat echocardiogram today which demonstrated:\par LVEF 63%\par Normal LV function\par Moderate pericardial effusion\par (1 ) cm posteriorly to left ventricle\par (2) cm adjacent to the right atrium\par Trace effusion adjacent to the right ventricle\par No evidence of pericardial tamponade\par \par \par This echo has been compared to prior studies on December 22, 2020 and November 30, 2020\par \par Effusion appears stable and some mild decrease in size.\par \par Interval Note\par May 3, 2021\par \par Patient returns for follow up. Blood pressure today is normotensive. EKG sinus rhythm with very low voltage @ 80 bpm on BB.\par \par Repeat echocardiogram today, May 3, 2021. Results reported:\par \par Normal LV systolic function\par No segmental wall motion abnormalities\par Normal RA\par Inferior vena cava measures 1.3 cm and collapses completely\par Normal RV size and function\par Moderate pericardial effusion. The effusion measure 1.4 cm posterolateral to the left ventricle, the effusion measures 1.3-1.5 cm adjacent to the RA and Ventricle. Trace pericardial effusion adjacent to the RV as seen in the subcostal view.\par \par No changes compared to February 8, 2021. \par \par Patient reports that she is feeling "ok" . Palpitations have decreased since being maintained on a beta blocker.\par \par \par

## 2021-05-06 ENCOUNTER — OUTPATIENT (OUTPATIENT)
Dept: OUTPATIENT SERVICES | Facility: HOSPITAL | Age: 51
LOS: 1 days | End: 2021-05-06
Payer: COMMERCIAL

## 2021-05-06 ENCOUNTER — APPOINTMENT (OUTPATIENT)
Dept: CT IMAGING | Facility: IMAGING CENTER | Age: 51
End: 2021-05-06
Payer: COMMERCIAL

## 2021-05-06 DIAGNOSIS — Z00.8 ENCOUNTER FOR OTHER GENERAL EXAMINATION: ICD-10-CM

## 2021-05-06 DIAGNOSIS — Z90.49 ACQUIRED ABSENCE OF OTHER SPECIFIED PARTS OF DIGESTIVE TRACT: Chronic | ICD-10-CM

## 2021-05-06 DIAGNOSIS — I31.3 PERICARDIAL EFFUSION (NONINFLAMMATORY): ICD-10-CM

## 2021-05-06 PROCEDURE — 71250 CT THORAX DX C-: CPT

## 2021-05-06 PROCEDURE — 71250 CT THORAX DX C-: CPT | Mod: 26

## 2021-05-07 ENCOUNTER — NON-APPOINTMENT (OUTPATIENT)
Age: 51
End: 2021-05-07

## 2021-05-11 ENCOUNTER — NON-APPOINTMENT (OUTPATIENT)
Age: 51
End: 2021-05-11

## 2021-05-11 ENCOUNTER — APPOINTMENT (OUTPATIENT)
Dept: ELECTROPHYSIOLOGY | Facility: CLINIC | Age: 51
End: 2021-05-11
Payer: COMMERCIAL

## 2021-05-11 VITALS
DIASTOLIC BLOOD PRESSURE: 96 MMHG | HEIGHT: 66 IN | WEIGHT: 156 LBS | BODY MASS INDEX: 25.07 KG/M2 | SYSTOLIC BLOOD PRESSURE: 143 MMHG | OXYGEN SATURATION: 99 %

## 2021-05-11 VITALS — HEART RATE: 109 BPM | OXYGEN SATURATION: 99 %

## 2021-05-11 VITALS — SYSTOLIC BLOOD PRESSURE: 152 MMHG | DIASTOLIC BLOOD PRESSURE: 89 MMHG

## 2021-05-11 PROCEDURE — 93000 ELECTROCARDIOGRAM COMPLETE: CPT

## 2021-05-11 PROCEDURE — 99213 OFFICE O/P EST LOW 20 MIN: CPT

## 2021-05-11 PROCEDURE — 99072 ADDL SUPL MATRL&STAF TM PHE: CPT

## 2021-05-11 PROCEDURE — 93005 ELECTROCARDIOGRAM TRACING: CPT

## 2021-05-12 NOTE — PHYSICAL EXAM
[Well Developed] : well developed [Well Nourished] : well nourished [No Acute Distress] : no acute distress [Normal] : normal venous pressure, no carotid bruit [Normal S1, S2] : normal S1, S2 [No Murmur] : no murmur [No Rub] : no rub [No Gallop] : no gallop [Clear Lung Fields] : clear lung fields [Good Air Entry] : good air entry [No Respiratory Distress] : no respiratory distress  [Soft] : abdomen soft [Non Tender] : non-tender [Normal Gait] : normal gait [No Edema] : no edema [No Rash] : no rash [No Focal Deficits] : no focal deficits [Alert and Oriented] : alert and oriented [Normal memory] : normal memory

## 2021-05-13 NOTE — DISCUSSION/SUMMARY
[FreeTextEntry1] : In summary, Ms. Pickering is a 51 year old woman with a history of symptomatic paroxysmal atrial tachycardia and persistent idiopathic small pericardial effusion. We discussed the pathophysiology of atrial arrhythmias and management strategies. We discussed medication versus ablation therapy. Risks and benefits were discussed with each option. The procedures, outcomes and risks of ablation were reviewed. Risks discussed included but were not limited to bleeding, vascular damage, cardiac perforation, tamponade, phrenic nerve injury, stroke, pulmonary vein stenosis and atrial esophageal fistula. At this time, we will continue the metoprolol and see if any intervention is planned for her idiopathic pericardial effusion. We added to her regimen an extra prn dose of metoprolol 12.5mg if she has persistent palpitations and can take up to 2 extra doses for an episode. She would not like to increase her maintenance dose of metoprolol as she reports feeling tired on metoprolol. She will continue monitoring her heart rhythm on the Apple watch and record any episodes where she feels like she is having atrial fibrillation. At this time we have not yet seen any documentation of AF. She will follow up in 3 months and rediscuss ablation. \par

## 2021-05-13 NOTE — CARDIOLOGY SUMMARY
[de-identified] : today: sinus rhythm at 78 bpm [de-identified] : 4/28/2021:\par Normal LV internal dimensions and wall thicknesses.\par Normal LV systolic function.\par Normal RA.\par Normal RV size and function.\par Moderate pericardial effusion. [de-identified] : Chest CT (5/6/2021):\par Unchanged small pericardial effusion relative to 11/20/2020 examination

## 2021-05-13 NOTE — HISTORY OF PRESENT ILLNESS
[FreeTextEntry1] : I had the pleasure of seeing Raven Pickering today in the arrhythmia clinic at [facility]. As you well know, she is a pleasant 51 year old woman with a history of palpitations in which a Biotel monitor showing episodes of a paroxysmal pulmonary vein atrial tachycardia. Last fall she was diagnosed with a pericardial effusion but at that time was felt not large enough for drain. Since her last visit she reports that she has had infrequent palpitations up until about the last week when she has had brief palpitations mostly in the evening lasting seconds. She does state in past with her palpitations her heart rates fluctuate. She monitors her heart rate via her Apple watch. She is maintained on metoprolol 12.5mg daily. She reports that the low dose metoprolol does make her tired.\par \par She had a recent CT scan which showed a persistent small pericardial effusion. There are discussions with CT surgery whether a pericardial window is feasible and should be done to assess this effusion of unknown etiology.\par

## 2021-06-11 ENCOUNTER — NON-APPOINTMENT (OUTPATIENT)
Age: 51
End: 2021-06-11

## 2021-06-14 ENCOUNTER — NON-APPOINTMENT (OUTPATIENT)
Age: 51
End: 2021-06-14

## 2021-07-06 ENCOUNTER — RX CHANGE (OUTPATIENT)
Age: 51
End: 2021-07-06

## 2021-07-07 ENCOUNTER — RX CHANGE (OUTPATIENT)
Age: 51
End: 2021-07-07

## 2021-08-24 ENCOUNTER — APPOINTMENT (OUTPATIENT)
Dept: ELECTROPHYSIOLOGY | Facility: CLINIC | Age: 51
End: 2021-08-24
Payer: COMMERCIAL

## 2021-08-24 ENCOUNTER — NON-APPOINTMENT (OUTPATIENT)
Age: 51
End: 2021-08-24

## 2021-08-24 VITALS
HEIGHT: 66 IN | HEART RATE: 94 BPM | WEIGHT: 154 LBS | DIASTOLIC BLOOD PRESSURE: 82 MMHG | SYSTOLIC BLOOD PRESSURE: 123 MMHG | BODY MASS INDEX: 24.75 KG/M2 | OXYGEN SATURATION: 98 %

## 2021-08-24 PROCEDURE — 93000 ELECTROCARDIOGRAM COMPLETE: CPT

## 2021-08-24 PROCEDURE — 99215 OFFICE O/P EST HI 40 MIN: CPT

## 2021-08-25 NOTE — CARDIOLOGY SUMMARY
[de-identified] : Apple watch strips with PAF [de-identified] : today: SR with low voltage\par \par  [de-identified] : 4/28/2021:\par Normal LV internal dimensions and wall thicknesses.\par Normal LV systolic function.\par Normal RA.\par Normal RV size and function.\par Moderate pericardial effusion. [de-identified] : Chest CT (5/6/2021):\par Unchanged small pericardial effusion relative to 11/20/2020 examination

## 2021-08-25 NOTE — PHYSICAL EXAM
[Well Developed] : well developed [Well Nourished] : well nourished [No Acute Distress] : no acute distress [Normal Conjunctiva] : normal conjunctiva [No Carotid Bruit] : no carotid bruit [Normal Venous Pressure] : normal venous pressure [Normal S1, S2] : normal S1, S2 [No Murmur] : no murmur [No Rub] : no rub [No Gallop] : no gallop [Clear Lung Fields] : clear lung fields [Good Air Entry] : good air entry [No Respiratory Distress] : no respiratory distress  [Soft] : abdomen soft [No Masses/organomegaly] : no masses/organomegaly [Non Tender] : non-tender [Normal Bowel Sounds] : normal bowel sounds [Normal Gait] : normal gait [No Edema] : no edema [No Cyanosis] : no cyanosis [No Clubbing] : no clubbing [No Varicosities] : no varicosities [No Rash] : no rash [Moves all extremities] : moves all extremities [No Skin Lesions] : no skin lesions [No Focal Deficits] : no focal deficits [Normal Speech] : normal speech [Alert and Oriented] : alert and oriented [Normal memory] : normal memory

## 2021-08-25 NOTE — HISTORY OF PRESENT ILLNESS
[FreeTextEntry1] : Continues to have episodes.  May last up to 5 hours. Frequency has been about 4x/month. The longer episodes seem to occur when she is sleeping (based on her watch).  No lightheadedness/dizziness.  Pounding is her main complaint.  No shortness of breath.  She did some exercise after her last visit with Dr Mittal, but has fallen off a bit.  SHe was trying to push herself.  She had been improving her stamina.  However she still notes an increase in heart rate with little activity.  There has been some improvement with the metoprolol.

## 2021-08-25 NOTE — DISCUSSION/SUMMARY
[FreeTextEntry1] : 51 year old woman with paroxysmal atrial arrhythmia; history of atrial tachycardia, but now with nonlooping monitor showing atrial fibrillation.  Symptoms long predate the observation of her idiopathic pericardial effusion, although may be of a similar etiology?  That is, she is yet to get a unifying diagnosis.  She feels "dismissed" by her Rheum consultant in the setting of negative serologies.  We discussed possible second opinion in this young female with idiopathic pericardial effusion, Raynaud's, and lace appearing lower extremities.  For now I suggested a repeat echo.  \par \par We discussed options for her arrhythmia (fib and PAT).  Despite breakthrough, she is doing better on low dose beta blocker with a decreased frequency and slower ventricular response.  We discussed a plan that likely includes EP testing with an aim at ablation; pending the results of the echo.  While her [id number] is low (1 for female), she will still need to take oral AC around the time of the procedure.

## 2021-09-21 ENCOUNTER — APPOINTMENT (OUTPATIENT)
Dept: CV DIAGNOSITCS | Facility: HOSPITAL | Age: 51
End: 2021-09-21

## 2021-09-21 ENCOUNTER — OUTPATIENT (OUTPATIENT)
Dept: OUTPATIENT SERVICES | Facility: HOSPITAL | Age: 51
LOS: 1 days | End: 2021-09-21
Payer: COMMERCIAL

## 2021-09-21 DIAGNOSIS — I31.3 PERICARDIAL EFFUSION (NONINFLAMMATORY): ICD-10-CM

## 2021-09-21 DIAGNOSIS — Z90.49 ACQUIRED ABSENCE OF OTHER SPECIFIED PARTS OF DIGESTIVE TRACT: Chronic | ICD-10-CM

## 2021-09-21 DIAGNOSIS — I47.1 SUPRAVENTRICULAR TACHYCARDIA: ICD-10-CM

## 2021-09-21 PROCEDURE — 93306 TTE W/DOPPLER COMPLETE: CPT

## 2021-09-21 PROCEDURE — 93306 TTE W/DOPPLER COMPLETE: CPT | Mod: 26

## 2021-10-18 ENCOUNTER — TRANSCRIPTION ENCOUNTER (OUTPATIENT)
Age: 51
End: 2021-10-18

## 2021-11-17 ENCOUNTER — APPOINTMENT (OUTPATIENT)
Dept: INTERNAL MEDICINE | Facility: CLINIC | Age: 51
End: 2021-11-17
Payer: COMMERCIAL

## 2021-11-17 PROCEDURE — G0008: CPT

## 2021-11-17 PROCEDURE — 90686 IIV4 VACC NO PRSV 0.5 ML IM: CPT

## 2021-12-10 ENCOUNTER — APPOINTMENT (OUTPATIENT)
Dept: INTERNAL MEDICINE | Facility: CLINIC | Age: 51
End: 2021-12-10
Payer: COMMERCIAL

## 2021-12-10 VITALS
BODY MASS INDEX: 24.91 KG/M2 | WEIGHT: 155 LBS | OXYGEN SATURATION: 97 % | DIASTOLIC BLOOD PRESSURE: 87 MMHG | HEART RATE: 89 BPM | SYSTOLIC BLOOD PRESSURE: 130 MMHG | RESPIRATION RATE: 17 BRPM | HEIGHT: 66 IN | TEMPERATURE: 97.9 F

## 2021-12-10 DIAGNOSIS — Z12.4 ENCOUNTER FOR SCREENING FOR MALIGNANT NEOPLASM OF CERVIX: ICD-10-CM

## 2021-12-10 DIAGNOSIS — Z12.83 ENCOUNTER FOR SCREENING FOR MALIGNANT NEOPLASM OF SKIN: ICD-10-CM

## 2021-12-10 DIAGNOSIS — Z85.828 PERSONAL HISTORY OF OTHER MALIGNANT NEOPLASM OF SKIN: ICD-10-CM

## 2021-12-10 DIAGNOSIS — I73.00 RAYNAUD'S SYNDROME W/OUT GANGRENE: ICD-10-CM

## 2021-12-10 DIAGNOSIS — R32 UNSPECIFIED URINARY INCONTINENCE: ICD-10-CM

## 2021-12-10 DIAGNOSIS — Z12.39 ENCOUNTER FOR OTHER SCREENING FOR MALIGNANT NEOPLASM OF BREAST: ICD-10-CM

## 2021-12-10 DIAGNOSIS — N95.1 MENOPAUSAL AND FEMALE CLIMACTERIC STATES: ICD-10-CM

## 2021-12-10 DIAGNOSIS — Z23 ENCOUNTER FOR IMMUNIZATION: ICD-10-CM

## 2021-12-10 DIAGNOSIS — H91.93 UNSPECIFIED HEARING LOSS, BILATERAL: ICD-10-CM

## 2021-12-10 DIAGNOSIS — R92.2 INCONCLUSIVE MAMMOGRAM: ICD-10-CM

## 2021-12-10 DIAGNOSIS — H43.399 OTHER VITREOUS OPACITIES, UNSPECIFIED EYE: ICD-10-CM

## 2021-12-10 PROCEDURE — 99396 PREV VISIT EST AGE 40-64: CPT

## 2021-12-10 RX ORDER — ZOSTER VACCINE RECOMBINANT, ADJUVANTED 50 MCG/0.5
50 KIT INTRAMUSCULAR
Qty: 1 | Refills: 1 | Status: ACTIVE | COMMUNITY
Start: 2021-12-10 | End: 1900-01-01

## 2021-12-12 LAB
25(OH)D3 SERPL-MCNC: 61 NG/ML
ALBUMIN SERPL ELPH-MCNC: 5 G/DL
ALP BLD-CCNC: 72 U/L
ALT SERPL-CCNC: 17 U/L
ANION GAP SERPL CALC-SCNC: 15 MMOL/L
APPEARANCE: CLEAR
AST SERPL-CCNC: 19 U/L
BACTERIA: NEGATIVE
BASOPHILS # BLD AUTO: 0.04 K/UL
BASOPHILS NFR BLD AUTO: 0.6 %
BILIRUB SERPL-MCNC: 0.5 MG/DL
BILIRUBIN URINE: NEGATIVE
BLOOD URINE: NEGATIVE
BUN SERPL-MCNC: 16 MG/DL
CALCIUM SERPL-MCNC: 10.2 MG/DL
CHLORIDE SERPL-SCNC: 102 MMOL/L
CHOLEST SERPL-MCNC: 204 MG/DL
CO2 SERPL-SCNC: 25 MMOL/L
COLOR: COLORLESS
CREAT SERPL-MCNC: 0.74 MG/DL
EOSINOPHIL # BLD AUTO: 0.08 K/UL
EOSINOPHIL NFR BLD AUTO: 1.2 %
ESTIMATED AVERAGE GLUCOSE: 117 MG/DL
GLUCOSE QUALITATIVE U: NEGATIVE
GLUCOSE SERPL-MCNC: 102 MG/DL
HBA1C MFR BLD HPLC: 5.7 %
HCT VFR BLD CALC: 44.5 %
HDLC SERPL-MCNC: 67 MG/DL
HGB BLD-MCNC: 14.6 G/DL
HYALINE CASTS: 0 /LPF
IMM GRANULOCYTES NFR BLD AUTO: 0.2 %
KETONES URINE: NEGATIVE
LDLC SERPL CALC-MCNC: 124 MG/DL
LEUKOCYTE ESTERASE URINE: NEGATIVE
LYMPHOCYTES # BLD AUTO: 1.87 K/UL
LYMPHOCYTES NFR BLD AUTO: 28.5 %
MAN DIFF?: NORMAL
MCHC RBC-ENTMCNC: 29.1 PG
MCHC RBC-ENTMCNC: 32.8 GM/DL
MCV RBC AUTO: 88.6 FL
MICROSCOPIC-UA: NORMAL
MONOCYTES # BLD AUTO: 0.55 K/UL
MONOCYTES NFR BLD AUTO: 8.4 %
NEUTROPHILS # BLD AUTO: 4.02 K/UL
NEUTROPHILS NFR BLD AUTO: 61.1 %
NITRITE URINE: NEGATIVE
NONHDLC SERPL-MCNC: 137 MG/DL
PH URINE: 6.5
PLATELET # BLD AUTO: 316 K/UL
POTASSIUM SERPL-SCNC: 3.8 MMOL/L
PROT SERPL-MCNC: 7.5 G/DL
PROTEIN URINE: NEGATIVE
RBC # BLD: 5.02 M/UL
RBC # FLD: 12.8 %
RED BLOOD CELLS URINE: 1 /HPF
SODIUM SERPL-SCNC: 142 MMOL/L
SPECIFIC GRAVITY URINE: 1
SQUAMOUS EPITHELIAL CELLS: 0 /HPF
TRIGL SERPL-MCNC: 62 MG/DL
TSH SERPL-ACNC: 1.69 UIU/ML
UROBILINOGEN URINE: NORMAL
WBC # FLD AUTO: 6.57 K/UL
WHITE BLOOD CELLS URINE: 0 /HPF

## 2021-12-27 DIAGNOSIS — M25.562 PAIN IN LEFT KNEE: ICD-10-CM

## 2022-01-04 ENCOUNTER — NON-APPOINTMENT (OUTPATIENT)
Age: 52
End: 2022-01-04

## 2022-01-06 NOTE — PATIENT PROFILE ADULT - DEAF OR HARD OF HEARING?
airway patent/breath sounds equal/good air movement/respirations non-labored/clear to auscultation bilaterally
no

## 2022-01-07 ENCOUNTER — APPOINTMENT (OUTPATIENT)
Dept: GASTROENTEROLOGY | Facility: CLINIC | Age: 52
End: 2022-01-07
Payer: COMMERCIAL

## 2022-01-07 VITALS
WEIGHT: 153 LBS | BODY MASS INDEX: 24.59 KG/M2 | HEIGHT: 66 IN | TEMPERATURE: 97.5 F | DIASTOLIC BLOOD PRESSURE: 78 MMHG | SYSTOLIC BLOOD PRESSURE: 122 MMHG

## 2022-01-07 DIAGNOSIS — Z01.818 ENCOUNTER FOR OTHER PREPROCEDURAL EXAMINATION: ICD-10-CM

## 2022-01-07 DIAGNOSIS — Z12.11 ENCOUNTER FOR SCREENING FOR MALIGNANT NEOPLASM OF COLON: ICD-10-CM

## 2022-01-07 DIAGNOSIS — Z12.12 ENCOUNTER FOR SCREENING FOR MALIGNANT NEOPLASM OF COLON: ICD-10-CM

## 2022-01-07 PROCEDURE — 99204 OFFICE O/P NEW MOD 45 MIN: CPT

## 2022-01-07 RX ORDER — SODIUM SULFATE, POTASSIUM SULFATE, MAGNESIUM SULFATE 17.5; 3.13; 1.6 G/ML; G/ML; G/ML
17.5-3.13-1.6 SOLUTION, CONCENTRATE ORAL
Qty: 1 | Refills: 0 | Status: ACTIVE | COMMUNITY
Start: 2022-01-07 | End: 1900-01-01

## 2022-01-07 NOTE — HISTORY OF PRESENT ILLNESS
[de-identified] : Jan 07, 2022 \par \par DAVID MARROQUIN MD \par \par Tree of idiopathic pericarditis, apparently doing well and follows with cardiologist on a regular basis\par \par Patient tells me that it takes a long time for her to wake up from anesthetics in the past\par \par Ms. KESHA FIGUEROA 51 year is referred for colon cancer screening.  The patient denies any change in bowel movements, blood per rectum, abdominal, pelvic or rectal discomfort.   \par \par There is no family history of colon cancer or other gastrointestinal cancers.\par \par The patient denies any unexplained weight loss, fever chills or night sweats.\par \par There is no significant cardiac or pulmonary history.\par \par No complaints of chest pain, shortness of breath, palpitations, cough.\par \par The patient is feeling quite well.\par \par The patient is on no significant anticoagulant therapy or anti platelet therapy. \par \par No adverse reaction to anesthesia in the past.\par \par

## 2022-01-07 NOTE — ASSESSMENT
[FreeTextEntry1] : Impression\par \par She of pericardial effusion, doing well\par \par Follow-up with cardiologist on regular basis\par \par Request for colon cancer screening\par \par Suggest\par \par Cardiology clearance\par \par Anesthesia clearance\par \par Agree with colonoscopy\par \par Suprep\par \par The laxative, or its risks benefits and alternatives have been thoroughly reviewed with the patient in great detail. The laxative instructions have been reviewed in great detail with the patient.\par \par Risks/benefits:\par The procedure, the risks and benefits and alternatives have been reviewed in great detail with the patient.  Risks including, but not limited to sedation such as cardiac and pulmonary compromise, the procedure itself such as bleeding requiring hospitalization, transfusion, surgery, temporary or permanent colostomy.  Perforation or puncture of the requiring hospitalization, surgery, temporary colostomy.\par It has been explained to the patient that though colonoscopy is thought to be the best screening exam for colon cancer and polyps, no screening exam can find all colon polyps or cancers.  \par The patient expresses understanding of the procedure and consents to undergoing the procedure.\par

## 2022-01-07 NOTE — REASON FOR VISIT
[Initial Evaluation] : an initial evaluation [Spouse] : spouse [FreeTextEntry1] : Colon cancer screening

## 2022-01-13 ENCOUNTER — RESULT REVIEW (OUTPATIENT)
Age: 52
End: 2022-01-13

## 2022-01-18 ENCOUNTER — TRANSCRIPTION ENCOUNTER (OUTPATIENT)
Age: 52
End: 2022-01-18

## 2022-02-07 ENCOUNTER — APPOINTMENT (OUTPATIENT)
Dept: CARDIOLOGY | Facility: CLINIC | Age: 52
End: 2022-02-07
Payer: COMMERCIAL

## 2022-02-07 ENCOUNTER — NON-APPOINTMENT (OUTPATIENT)
Age: 52
End: 2022-02-07

## 2022-02-07 VITALS
DIASTOLIC BLOOD PRESSURE: 74 MMHG | HEIGHT: 66 IN | HEART RATE: 83 BPM | WEIGHT: 154 LBS | OXYGEN SATURATION: 100 % | BODY MASS INDEX: 24.75 KG/M2 | SYSTOLIC BLOOD PRESSURE: 108 MMHG

## 2022-02-07 DIAGNOSIS — Z12.11 ENCOUNTER FOR SCREENING FOR MALIGNANT NEOPLASM OF COLON: ICD-10-CM

## 2022-02-07 PROCEDURE — 93000 ELECTROCARDIOGRAM COMPLETE: CPT

## 2022-02-07 PROCEDURE — 99214 OFFICE O/P EST MOD 30 MIN: CPT

## 2022-02-08 PROBLEM — Z12.11 COLON CANCER SCREENING: Status: ACTIVE | Noted: 2020-10-27

## 2022-02-11 NOTE — PHYSICAL EXAM
[Normal S1, S2] : normal S1, S2 [No Rub] : no rub [No Gallop] : no gallop [Normal] : alert and oriented, normal memory [de-identified] : soft HSM

## 2022-02-11 NOTE — REASON FOR VISIT
[FreeTextEntry1] : 50 yo returns for evaluation of pericardial effusion, PAT prior to scheduled colonoscopy. Saw Dr Desai is the fall and reports that she is having far fewer episodes of palpitations.\par \par SHe is more active and feeling no CP, RUVALCABA or palpitations. SOmetimes she is anxious about medical issues but overall doing well.\par \par Last echo 9/21 showed unchanged pericardial effusion, no other abnl.\par \par Scheduled for colonoscopy 2/24.

## 2022-02-11 NOTE — ASSESSMENT
[FreeTextEntry1] : 51 year old female with history of pericardial effusion and symptomatic paroxysmal atrial tachycardia.\par \par #PAT\par Patient has undergone a consultations with electrophysiologist, Dr. Dion Mason\par  She is not interested in a cardiac ablation.to treat her PAT\par  Tolerating Metoprolol tartrate 25 mg BIDd and has significant improvement in palpitations.\par  No long episodes. \par \par #Pericardial effusion\par   Stable and unchanged based on last echocardiogram performed on  9/22. \par   No evidence of hemodynamic impact of fluid. \par   Reviewed with patient  again regarding the challenge of draining effusion due to its location and size\par   Reviewed our consideration for options with interventional radiology and CT surgery. \par   WIll continue to monitor.\par \par #Scheduled colonoscopy with Dr. Alan Ralph\par  She reports having issues with anaesthesia with prior procedures - she sleeps for hours afterwards\par  Will notify Dr. Ralph\par * With regard to colonoscopy  prep-> will discuss concern regarding dehydration with GI and to consider IV  rehydration during procedure.\par \par ****There are no cardiovascular contraindications to proceed with colonoscopy procedure on February 24, 2022 with  Dr. Alan Ralph.

## 2022-02-14 ENCOUNTER — NON-APPOINTMENT (OUTPATIENT)
Age: 52
End: 2022-02-14

## 2022-02-24 ENCOUNTER — APPOINTMENT (OUTPATIENT)
Dept: GASTROENTEROLOGY | Facility: AMBULATORY MEDICAL SERVICES | Age: 52
End: 2022-02-24
Payer: COMMERCIAL

## 2022-02-24 PROCEDURE — 45378 DIAGNOSTIC COLONOSCOPY: CPT | Mod: 33

## 2022-03-18 ENCOUNTER — APPOINTMENT (OUTPATIENT)
Dept: MAMMOGRAPHY | Facility: IMAGING CENTER | Age: 52
End: 2022-03-18
Payer: COMMERCIAL

## 2022-03-18 ENCOUNTER — OUTPATIENT (OUTPATIENT)
Dept: OUTPATIENT SERVICES | Facility: HOSPITAL | Age: 52
LOS: 1 days | End: 2022-03-18
Payer: COMMERCIAL

## 2022-03-18 ENCOUNTER — RESULT REVIEW (OUTPATIENT)
Age: 52
End: 2022-03-18

## 2022-03-18 ENCOUNTER — APPOINTMENT (OUTPATIENT)
Dept: ULTRASOUND IMAGING | Facility: IMAGING CENTER | Age: 52
End: 2022-03-18
Payer: COMMERCIAL

## 2022-03-18 DIAGNOSIS — Z12.31 ENCOUNTER FOR SCREENING MAMMOGRAM FOR MALIGNANT NEOPLASM OF BREAST: ICD-10-CM

## 2022-03-18 DIAGNOSIS — R92.2 INCONCLUSIVE MAMMOGRAM: ICD-10-CM

## 2022-03-18 DIAGNOSIS — Z00.8 ENCOUNTER FOR OTHER GENERAL EXAMINATION: ICD-10-CM

## 2022-03-18 DIAGNOSIS — Z12.39 ENCOUNTER FOR OTHER SCREENING FOR MALIGNANT NEOPLASM OF BREAST: ICD-10-CM

## 2022-03-18 DIAGNOSIS — Z90.49 ACQUIRED ABSENCE OF OTHER SPECIFIED PARTS OF DIGESTIVE TRACT: Chronic | ICD-10-CM

## 2022-03-18 PROCEDURE — 77063 BREAST TOMOSYNTHESIS BI: CPT | Mod: 26

## 2022-03-18 PROCEDURE — 77067 SCR MAMMO BI INCL CAD: CPT | Mod: 26

## 2022-03-18 PROCEDURE — 76641 ULTRASOUND BREAST COMPLETE: CPT | Mod: 26,50

## 2022-03-18 PROCEDURE — 77063 BREAST TOMOSYNTHESIS BI: CPT

## 2022-03-18 PROCEDURE — 77067 SCR MAMMO BI INCL CAD: CPT

## 2022-03-18 PROCEDURE — 76641 ULTRASOUND BREAST COMPLETE: CPT

## 2022-05-09 ENCOUNTER — APPOINTMENT (OUTPATIENT)
Dept: CARDIOLOGY | Facility: CLINIC | Age: 52
End: 2022-05-09
Payer: COMMERCIAL

## 2022-05-09 ENCOUNTER — NON-APPOINTMENT (OUTPATIENT)
Age: 52
End: 2022-05-09

## 2022-05-09 VITALS
DIASTOLIC BLOOD PRESSURE: 86 MMHG | WEIGHT: 154 LBS | BODY MASS INDEX: 24.75 KG/M2 | OXYGEN SATURATION: 99 % | HEIGHT: 66 IN | SYSTOLIC BLOOD PRESSURE: 134 MMHG | HEART RATE: 89 BPM

## 2022-05-09 DIAGNOSIS — R00.2 PALPITATIONS: ICD-10-CM

## 2022-05-09 PROCEDURE — 99214 OFFICE O/P EST MOD 30 MIN: CPT

## 2022-05-09 PROCEDURE — 93000 ELECTROCARDIOGRAM COMPLETE: CPT

## 2022-05-09 NOTE — ASSESSMENT
[FreeTextEntry1] : 53 yo w \par small , chronic idiopathic pericardial effusion. Last echo a few months ago - unachnaged  will repeat in fall. W/up not revealing for etiology\par SVT - relatively asympotmatic on small dose of BBL

## 2022-05-09 NOTE — PHYSICAL EXAM
[Normal S1, S2] : normal S1, S2 [No Rub] : no rub [No Gallop] : no gallop [Normal] : alert and oriented, normal memory [de-identified] : soft HSM

## 2022-05-09 NOTE — REASON FOR VISIT
[FreeTextEntry1] : 53 yo w hx of small, chronic pericardial effusion of umknown origin. and PV SVT.\par \par Since last visit she has been feeling well - she walks many flights of stairs and feels fine. Infrequently she will look at apple watch and HR will be fastbut she is not symtpomatic.\par \par COntinues on BBL\par \par No RUVALCABA , CP, PND, fevers or chills.\par \par Had recent colonoscopy which went well.  tolearted consiouc sedation wo difficulty.

## 2022-06-09 NOTE — ED PROVIDER NOTE - NO PERTINENT FAMILY HISTORY IN FIRST DEGREE RELATIVES OF:
autoimmune diseases, pericardial effusion, heart diseases Zygomaticofacial Flap Text: Given the location of the defect, shape of the defect and the proximity to free margins a zygomaticofacial flap was deemed most appropriate for repair.  Using a sterile surgical marker, the appropriate flap was drawn incorporating the defect and placing the expected incisions within the relaxed skin tension lines where possible. The area thus outlined was incised deep to adipose tissue with a #15 scalpel blade with preservation of a vascular pedicle.  The skin margins were undermined to an appropriate distance in all directions utilizing iris scissors.  The flap was then placed into the defect and anchored with interrupted buried subcutaneous sutures.

## 2022-07-06 NOTE — DISCUSSION/SUMMARY
[FreeTextEntry1] :  Ms. Pickering is a pleasant 50 year old female presented for follow up of large pericardial effusion  that was drained a few weeks ago. associated with palpitations. \par  Repeat echo showed pericardial effusion improved. All work up including Rheum - in conclusive of the etiology of the pericardial effusion. \par Patient advised to follow up in 4 weeks \par Repeat echo to evaluate the effusion.  Include Location In Plan?: No Detail Level: Zone

## 2022-07-18 ENCOUNTER — TRANSCRIPTION ENCOUNTER (OUTPATIENT)
Age: 52
End: 2022-07-18

## 2022-07-18 DIAGNOSIS — Z11.52 ENCOUNTER FOR SCREENING FOR COVID-19: ICD-10-CM

## 2022-07-18 LAB
25(OH)D3 SERPL-MCNC: 89.1 NG/ML
ALBUMIN SERPL ELPH-MCNC: 4.9 G/DL
ALP BLD-CCNC: 66 U/L
ALT SERPL-CCNC: 15 U/L
ANION GAP SERPL CALC-SCNC: 12 MMOL/L
AST SERPL-CCNC: 18 U/L
BILIRUB SERPL-MCNC: 0.5 MG/DL
BUN SERPL-MCNC: 13 MG/DL
CALCIUM SERPL-MCNC: 10 MG/DL
CHLORIDE SERPL-SCNC: 104 MMOL/L
CHOLEST SERPL-MCNC: 169 MG/DL
CO2 SERPL-SCNC: 26 MMOL/L
CREAT SERPL-MCNC: 0.68 MG/DL
EGFR: 103 ML/MIN/1.73M2
ESTIMATED AVERAGE GLUCOSE: 117 MG/DL
GLUCOSE SERPL-MCNC: 94 MG/DL
HBA1C MFR BLD HPLC: 5.7 %
HDLC SERPL-MCNC: 52 MG/DL
LDLC SERPL CALC-MCNC: 108 MG/DL
NONHDLC SERPL-MCNC: 117 MG/DL
POTASSIUM SERPL-SCNC: 4.9 MMOL/L
PROT SERPL-MCNC: 7 G/DL
SODIUM SERPL-SCNC: 141 MMOL/L
TRIGL SERPL-MCNC: 43 MG/DL

## 2022-07-19 ENCOUNTER — TRANSCRIPTION ENCOUNTER (OUTPATIENT)
Age: 52
End: 2022-07-19

## 2022-07-19 LAB
COVID-19 NUCLEOCAPSID  GAM ANTIBODY INTERPRETATION: POSITIVE
COVID-19 SPIKE DOMAIN ANTIBODY INTERPRETATION: POSITIVE
SARS-COV-2 AB SERPL IA-ACNC: >250 U/ML
SARS-COV-2 AB SERPL QL IA: 65.8 INDEX

## 2022-07-21 ENCOUNTER — APPOINTMENT (OUTPATIENT)
Dept: INTERNAL MEDICINE | Facility: CLINIC | Age: 52
End: 2022-07-21

## 2022-09-12 ENCOUNTER — APPOINTMENT (OUTPATIENT)
Dept: CARDIOLOGY | Facility: CLINIC | Age: 52
End: 2022-09-12

## 2022-09-12 ENCOUNTER — OUTPATIENT (OUTPATIENT)
Dept: OUTPATIENT SERVICES | Facility: HOSPITAL | Age: 52
LOS: 1 days | End: 2022-09-12
Payer: COMMERCIAL

## 2022-09-12 ENCOUNTER — APPOINTMENT (OUTPATIENT)
Dept: CV DIAGNOSITCS | Facility: HOSPITAL | Age: 52
End: 2022-09-12

## 2022-09-12 ENCOUNTER — NON-APPOINTMENT (OUTPATIENT)
Age: 52
End: 2022-09-12

## 2022-09-12 VITALS
SYSTOLIC BLOOD PRESSURE: 128 MMHG | WEIGHT: 154 LBS | BODY MASS INDEX: 24.75 KG/M2 | OXYGEN SATURATION: 100 % | HEIGHT: 66 IN | HEART RATE: 87 BPM | DIASTOLIC BLOOD PRESSURE: 82 MMHG

## 2022-09-12 DIAGNOSIS — Z90.49 ACQUIRED ABSENCE OF OTHER SPECIFIED PARTS OF DIGESTIVE TRACT: Chronic | ICD-10-CM

## 2022-09-12 DIAGNOSIS — I47.1 SUPRAVENTRICULAR TACHYCARDIA: ICD-10-CM

## 2022-09-12 PROCEDURE — 93306 TTE W/DOPPLER COMPLETE: CPT

## 2022-09-12 PROCEDURE — 93306 TTE W/DOPPLER COMPLETE: CPT | Mod: 26

## 2022-09-12 PROCEDURE — 93000 ELECTROCARDIOGRAM COMPLETE: CPT

## 2022-10-04 LAB
24R-OH-CALCIDIOL SERPL-MCNC: 66.8 PG/ML
25(OH)D3 SERPL-MCNC: 59.1 NG/ML

## 2023-01-04 ENCOUNTER — NON-APPOINTMENT (OUTPATIENT)
Age: 53
End: 2023-01-04

## 2023-01-05 ENCOUNTER — NON-APPOINTMENT (OUTPATIENT)
Age: 53
End: 2023-01-05

## 2023-01-06 ENCOUNTER — NON-APPOINTMENT (OUTPATIENT)
Age: 53
End: 2023-01-06

## 2023-01-09 ENCOUNTER — NON-APPOINTMENT (OUTPATIENT)
Age: 53
End: 2023-01-09

## 2023-03-11 ENCOUNTER — NON-APPOINTMENT (OUTPATIENT)
Age: 53
End: 2023-03-11

## 2023-03-13 ENCOUNTER — OUTPATIENT (OUTPATIENT)
Dept: OUTPATIENT SERVICES | Facility: HOSPITAL | Age: 53
LOS: 1 days | End: 2023-03-13
Payer: COMMERCIAL

## 2023-03-13 ENCOUNTER — APPOINTMENT (OUTPATIENT)
Dept: CARDIOLOGY | Facility: CLINIC | Age: 53
End: 2023-03-13
Payer: COMMERCIAL

## 2023-03-13 ENCOUNTER — APPOINTMENT (OUTPATIENT)
Dept: CV DIAGNOSITCS | Facility: HOSPITAL | Age: 53
End: 2023-03-13

## 2023-03-13 ENCOUNTER — NON-APPOINTMENT (OUTPATIENT)
Age: 53
End: 2023-03-13

## 2023-03-13 VITALS
DIASTOLIC BLOOD PRESSURE: 77 MMHG | SYSTOLIC BLOOD PRESSURE: 117 MMHG | OXYGEN SATURATION: 99 % | HEIGHT: 66 IN | BODY MASS INDEX: 24.43 KG/M2 | WEIGHT: 152 LBS | HEART RATE: 74 BPM

## 2023-03-13 DIAGNOSIS — R73.03 PREDIABETES.: ICD-10-CM

## 2023-03-13 DIAGNOSIS — Z00.00 ENCOUNTER FOR GENERAL ADULT MEDICAL EXAMINATION W/OUT ABNORMAL FINDINGS: ICD-10-CM

## 2023-03-13 DIAGNOSIS — Z90.49 ACQUIRED ABSENCE OF OTHER SPECIFIED PARTS OF DIGESTIVE TRACT: Chronic | ICD-10-CM

## 2023-03-13 DIAGNOSIS — I31.39 OTHER PERICARDIAL EFFUSION (NONINFLAMMATORY): ICD-10-CM

## 2023-03-13 PROCEDURE — 93000 ELECTROCARDIOGRAM COMPLETE: CPT

## 2023-03-13 PROCEDURE — 76376 3D RENDER W/INTRP POSTPROCES: CPT | Mod: 26

## 2023-03-13 PROCEDURE — 99215 OFFICE O/P EST HI 40 MIN: CPT | Mod: 25

## 2023-03-13 PROCEDURE — 93306 TTE W/DOPPLER COMPLETE: CPT | Mod: 26

## 2023-03-13 PROCEDURE — 76376 3D RENDER W/INTRP POSTPROCES: CPT

## 2023-03-13 PROCEDURE — 93306 TTE W/DOPPLER COMPLETE: CPT

## 2023-03-19 PROBLEM — R73.03 PREDIABETES: Status: RESOLVED | Noted: 2020-10-27 | Resolved: 2023-03-19

## 2023-03-19 NOTE — CARDIOLOGY SUMMARY
[de-identified] : 3/13/23\par Sinus rhythm, low voltage @ 79 bpm  [de-identified] : Repeat echo today: March 13 ,2023\par Conclusions: \par 1. Normal mitral valve. Minimal mitral regurgitation.\par 2. Mildly calcified trileaflet aortic valve with normal\par opening. No aortic valve regurgitation seen.\par 3. Normal left ventricular systolic function. No segmental\par wall motion abnormalities.\par 4. Normal right ventricular size and function.\par 5. Small-moderate circumferential pericardial effusion. The\par effusion measures approximately 0.9 cm adjacent to the RV\par apex and 1.2 cm posterior to the LV in its largest\par dimension. The IVC is small and collapses with inspiration\par consistent with low right atrial pressure (estimated RA\par pressure 3 mmHg). No echocardiographic evidence of\par pericardial tamponade.\par *** Compared with echocardiogram of 9/12/2022, the\par pericardial effusion has decreased in size. There was\par evidence of decreased expansion of the right ventricle in\par early diastole on the prior study but not present today.

## 2023-03-19 NOTE — PHYSICAL EXAM
[Normal S1, S2] : normal S1, S2 [No Rub] : no rub [No Gallop] : no gallop [Normal] : alert and oriented, normal memory [de-identified] : soft HSM

## 2023-03-19 NOTE — ASSESSMENT
[FreeTextEntry1] : Ms. Pickering is now 52 years old, former smoker  and returns for a cardiovascular follow up.\par She carries a medical history as outlined below:\par -Hx of idiopathic pericarditis\par -Hx of PAT\par -Hx of symptomatic palpitations\par -Hx of prediabetes\par -Hx of pericardial effusion\par \par #Hx of pericardial effusion\par   Echo results today:\par   *** Compared with echocardiogram of 9/12/2022, the\par pericardial effusion has decreased in size. There was\par evidence of decreased expansion of the right ventricle in\par early diastole on the prior study but not present today.\par  Will continue serial echo assessment annually\par \par #Symptomatic palpitations\par    Continue on Metoprolol Succinate ER 25 mg QD\par \par #Hx of prediabetes\par   Repeat full blood work panel for review and assessment\par   CBC. CMP, Hgb A1C, TSH, Lipid panel\par \par - Encouraged patient to participate in  healthy exercise and eating habits, focusing on a Mediterranean style of eating and aiming for the recommended 150 minutes per week of moderate physical activity.\par \par - Encouraged the patient to find healthy outlets and coping mechanisms to help manage stress, such as physical activity/exercise, reducing workload if possible, spending time with family and friends, engaging in an enjoyable hobby, or using meditation or mindfulness techniques.\par \par \par \par

## 2023-03-19 NOTE — REASON FOR VISIT
[FreeTextEntry1] : 51 yo w hx of small, chronic pericardial effusion of unknown origin. and PV SVT.\par \par Since last visit she has been feeling well - she walks many flights of stairs and feels fine. Infrequently she will look at apple watch and HR will be fast but she is not symptomatic.\par \par COntinues on BBL\par \par No RUVALCABA , CP, PND, fevers or chills.\par \par Had recent colonoscopy which went well.  tolerated conscious sedation w/o difficulty.\par \par Interval Note\par March 13, 2023\par \par Ms. Pickering is now 52 years old, former smoker  and returns for a cardiovascular follow up.\par She carries a medical history as outlined below:\par -Hx of idiopathic pericarditis\par -Hx of PAT\par -Hx of symptomatic palpitations\par -Hx of prediabetes\par -Hx of pericardial effusion\par \par Blood pressure today:  117/77\par EKG- Sinus rhythm, low voltage @ 79 bpm \par \par Repeat echo today: March 13 ,2023\par Conclusions: \par 1. Normal mitral valve. Minimal mitral regurgitation.\par 2. Mildly calcified trileaflet aortic valve with normal\par opening. No aortic valve regurgitation seen.\par 3. Normal left ventricular systolic function. No segmental\par wall motion abnormalities.\par 4. Normal right ventricular size and function.\par 5. Small-moderate circumferential pericardial effusion. The\par effusion measures approximately 0.9 cm adjacent to the RV\par apex and 1.2 cm posterior to the LV in its largest\par dimension. The IVC is small and collapses with inspiration\par consistent with low right atrial pressure (estimated RA\par pressure 3 mmHg). No echocardiographic evidence of\par pericardial tamponade.\par *** Compared with echocardiogram of 9/12/2022, the\par pericardial effusion has decreased in size. There was\par evidence of decreased expansion of the right ventricle in\par early diastole on the prior study but not present today.\par \par Patient continues to feel well - she is able to climb flights of stairs without difficulty.\par She has noticed some intermittent elevated heart rate that is nicely controlled on BB\par She does report that she has not yet  returned to rheumatology to evaluate possible underlying cause of her pericardial effusion.\par \par

## 2023-03-24 ENCOUNTER — RX RENEWAL (OUTPATIENT)
Age: 53
End: 2023-03-24

## 2023-04-19 ENCOUNTER — APPOINTMENT (OUTPATIENT)
Dept: RADIOLOGY | Facility: IMAGING CENTER | Age: 53
End: 2023-04-19
Payer: COMMERCIAL

## 2023-04-19 ENCOUNTER — OUTPATIENT (OUTPATIENT)
Dept: OUTPATIENT SERVICES | Facility: HOSPITAL | Age: 53
LOS: 1 days | End: 2023-04-19
Payer: COMMERCIAL

## 2023-04-19 ENCOUNTER — APPOINTMENT (OUTPATIENT)
Dept: MAMMOGRAPHY | Facility: IMAGING CENTER | Age: 53
End: 2023-04-19
Payer: COMMERCIAL

## 2023-04-19 ENCOUNTER — APPOINTMENT (OUTPATIENT)
Dept: ULTRASOUND IMAGING | Facility: IMAGING CENTER | Age: 53
End: 2023-04-19
Payer: COMMERCIAL

## 2023-04-19 DIAGNOSIS — Z90.49 ACQUIRED ABSENCE OF OTHER SPECIFIED PARTS OF DIGESTIVE TRACT: Chronic | ICD-10-CM

## 2023-04-19 DIAGNOSIS — Z00.8 ENCOUNTER FOR OTHER GENERAL EXAMINATION: ICD-10-CM

## 2023-04-19 PROCEDURE — 76641 ULTRASOUND BREAST COMPLETE: CPT | Mod: 26,50

## 2023-04-19 PROCEDURE — 77067 SCR MAMMO BI INCL CAD: CPT | Mod: 26

## 2023-04-19 PROCEDURE — 76641 ULTRASOUND BREAST COMPLETE: CPT

## 2023-04-19 PROCEDURE — 77080 DXA BONE DENSITY AXIAL: CPT

## 2023-04-19 PROCEDURE — 77063 BREAST TOMOSYNTHESIS BI: CPT

## 2023-04-19 PROCEDURE — 77067 SCR MAMMO BI INCL CAD: CPT

## 2023-04-19 PROCEDURE — 77080 DXA BONE DENSITY AXIAL: CPT | Mod: 26

## 2023-04-19 PROCEDURE — 77063 BREAST TOMOSYNTHESIS BI: CPT | Mod: 26

## 2023-05-17 ENCOUNTER — NON-APPOINTMENT (OUTPATIENT)
Age: 53
End: 2023-05-17

## 2023-05-17 LAB
ALBUMIN SERPL ELPH-MCNC: 4.5 G/DL
ALP BLD-CCNC: 55 U/L
ALT SERPL-CCNC: 14 U/L
ANION GAP SERPL CALC-SCNC: 11 MMOL/L
AST SERPL-CCNC: 16 U/L
BASOPHILS # BLD AUTO: 0.05 K/UL
BASOPHILS NFR BLD AUTO: 0.8 %
BILIRUB SERPL-MCNC: 0.2 MG/DL
BUN SERPL-MCNC: 23 MG/DL
CALCIUM SERPL-MCNC: 10.1 MG/DL
CHLORIDE SERPL-SCNC: 109 MMOL/L
CHOLEST SERPL-MCNC: 160 MG/DL
CO2 SERPL-SCNC: 26 MMOL/L
CREAT SERPL-MCNC: 0.67 MG/DL
EGFR: 103 ML/MIN/1.73M2
EOSINOPHIL # BLD AUTO: 0.13 K/UL
EOSINOPHIL NFR BLD AUTO: 2 %
ESTIMATED AVERAGE GLUCOSE: 114 MG/DL
GLUCOSE SERPL-MCNC: 109 MG/DL
HBA1C MFR BLD HPLC: 5.6 %
HCT VFR BLD CALC: 42 %
HDLC SERPL-MCNC: 54 MG/DL
HGB BLD-MCNC: 13.7 G/DL
IMM GRANULOCYTES NFR BLD AUTO: 0.2 %
LDLC SERPL CALC-MCNC: 94 MG/DL
LYMPHOCYTES # BLD AUTO: 1.63 K/UL
LYMPHOCYTES NFR BLD AUTO: 25 %
MAN DIFF?: NORMAL
MCHC RBC-ENTMCNC: 29.6 PG
MCHC RBC-ENTMCNC: 32.6 GM/DL
MCV RBC AUTO: 90.7 FL
MONOCYTES # BLD AUTO: 0.77 K/UL
MONOCYTES NFR BLD AUTO: 11.8 %
NEUTROPHILS # BLD AUTO: 3.94 K/UL
NEUTROPHILS NFR BLD AUTO: 60.2 %
NONHDLC SERPL-MCNC: 106 MG/DL
PLATELET # BLD AUTO: 258 K/UL
POTASSIUM SERPL-SCNC: 4.5 MMOL/L
PROT SERPL-MCNC: 6.7 G/DL
RBC # BLD: 4.63 M/UL
RBC # FLD: 12.8 %
SODIUM SERPL-SCNC: 146 MMOL/L
TRIGL SERPL-MCNC: 58 MG/DL
TSH SERPL-ACNC: 1.84 UIU/ML
WBC # FLD AUTO: 6.53 K/UL

## 2023-10-09 ENCOUNTER — NON-APPOINTMENT (OUTPATIENT)
Age: 53
End: 2023-10-09

## 2023-10-09 ENCOUNTER — APPOINTMENT (OUTPATIENT)
Dept: CARDIOLOGY | Facility: CLINIC | Age: 53
End: 2023-10-09
Payer: COMMERCIAL

## 2023-10-09 VITALS
HEART RATE: 68 BPM | OXYGEN SATURATION: 100 % | BODY MASS INDEX: 24.59 KG/M2 | SYSTOLIC BLOOD PRESSURE: 134 MMHG | WEIGHT: 153 LBS | HEIGHT: 66 IN | DIASTOLIC BLOOD PRESSURE: 82 MMHG

## 2023-10-09 VITALS — DIASTOLIC BLOOD PRESSURE: 78 MMHG | SYSTOLIC BLOOD PRESSURE: 128 MMHG

## 2023-10-09 DIAGNOSIS — I30.0 ACUTE NONSPECIFIC IDIOPATHIC PERICARDITIS: ICD-10-CM

## 2023-10-09 DIAGNOSIS — R07.9 CHEST PAIN, UNSPECIFIED: ICD-10-CM

## 2023-10-09 DIAGNOSIS — R00.2 PALPITATIONS: ICD-10-CM

## 2023-10-09 PROCEDURE — 93000 ELECTROCARDIOGRAM COMPLETE: CPT

## 2023-10-09 PROCEDURE — 99215 OFFICE O/P EST HI 40 MIN: CPT | Mod: 25

## 2023-10-09 RX ORDER — METOPROLOL TARTRATE 25 MG/1
25 TABLET, FILM COATED ORAL TWICE DAILY
Qty: 30 | Refills: 0 | Status: DISCONTINUED | COMMUNITY
Start: 2021-05-11 | End: 2023-10-09

## 2023-10-16 PROBLEM — R00.2 INTERMITTENT PALPITATIONS: Status: ACTIVE | Noted: 2017-06-05

## 2023-10-16 PROBLEM — I30.0 IDIOPATHIC PERICARDITIS, UNSPECIFIED CHRONICITY: Status: ACTIVE | Noted: 2020-11-16

## 2023-10-26 ENCOUNTER — OUTPATIENT (OUTPATIENT)
Dept: OUTPATIENT SERVICES | Facility: HOSPITAL | Age: 53
LOS: 1 days | End: 2023-10-26
Payer: COMMERCIAL

## 2023-10-26 ENCOUNTER — APPOINTMENT (OUTPATIENT)
Dept: CT IMAGING | Facility: IMAGING CENTER | Age: 53
End: 2023-10-26
Payer: COMMERCIAL

## 2023-10-26 DIAGNOSIS — Z90.49 ACQUIRED ABSENCE OF OTHER SPECIFIED PARTS OF DIGESTIVE TRACT: Chronic | ICD-10-CM

## 2023-10-26 DIAGNOSIS — I30.0 ACUTE NONSPECIFIC IDIOPATHIC PERICARDITIS: ICD-10-CM

## 2023-10-26 PROCEDURE — 71250 CT THORAX DX C-: CPT

## 2023-10-26 PROCEDURE — 71250 CT THORAX DX C-: CPT | Mod: 26

## 2023-10-31 ENCOUNTER — RX CHANGE (OUTPATIENT)
Age: 53
End: 2023-10-31

## 2023-11-05 ENCOUNTER — NON-APPOINTMENT (OUTPATIENT)
Age: 53
End: 2023-11-05

## 2023-11-06 ENCOUNTER — RX RENEWAL (OUTPATIENT)
Age: 53
End: 2023-11-06

## 2023-11-09 NOTE — REASON FOR VISIT
ANTICOAGULATION     Selvin Rockwell is overdue for an INR check.     Spoke with Alicia and patient will have labs checked at next office visit on 11-10-23 at [facility].    Ale Vasquez RN    [FreeTextEntry1] : Dear Dr. Mittal:\par \par I had the pleasure of evaluating your patient, Ms. Raven Pickering for initial vascular medicine evaluation.\par \par She is a 50 year old woman who was noted to have a large pericardial effusion associated with palpitations.  As per provided history and chart notes, in November 2020, echocardiograms noted large pericardial effusion. An attempt was made in the cath lab to drain the effusion, but there was apparently not enough fluid to perform a pericardiocentesis. She was admitted overnight for observation and d/patricia on colchicine.\par \par She was readmitted to ER on November 20, 2020 for rapid heart rates (thought to be PV tachycardia by EP/Giselle) and ongoing pericardial effusion of unknown etiology. Patient remained at [facility] on telemetry from 11/20- 11/23. No evidence of tachycardia was identified. Images reviewed by IR and CT surgery. Both divisions felt that there was insufficient fluid to tap. \par \par Of note, she had undergone malignancy scans of her chest, abdomen and pelvis which were all negative.  I have reviewed these images again with Radiology myself and we have the same conclusions with different set of eyes.\par \par An in-depth serologic evaluation was also performed by Rheumatology and has been unremarkable.  She does not know of any significant FH of autoimmune or other rheumatologic/inflammatory conditions except for a niece with juvenile arthritis.  She reports having no other constitutional complaints, no unexplained weight loss, and denies prior toxic habits.  Has been ruled out for COVID on several occasions.  \par \par From the vascular standpoint, the patient was noted to have a lace-like livedo pattern most prominent on the dorsal aspect of her thighs and shins.  She mentioned that she has had this for most of her life (at least the last ~25 years of her life), and had attributed the finding to having superficial vessels that were more readily visible because she has lighter complexion.  Also of note, she has toes in both feet that become dusky in appearance, which become more blue in cold or ambient temperatures.  She has similar findings on her fingers but to a lesser extent.  She also notes that her feet can feel as if they are "burning" particularly after taking a shower.\par \par From my examination and evaluation of all prior test results, I cannot identify an immediate unifying diagnosis:\par \par 1. Pericardial effusion -- probably inflammatory, although unclear from what at this point.  A sample of the fluid would be useful, but the effusion was not accessible in November.\par \par 2. Lower extremity findings -- Raynaud's or a related vasospastic phenomenon would be the likely cause for the exam findings.  Can have primary or secondary Raynaud's--as she has had this for some time, likely primary.  Similar findings can be noted in patients with chillblains/pernio, erythromelalgia (which shares the same symptom of burning), or dysautonomia.  Confirmatory testing can include Nick/PVR with toe PPG waveforms in both ambient and warm temperatures (ie hot pack applied to toes) to see if there is a difference in toe perfusion based on temperature changes).  \par \par As she has had these findings for ~25 years, she has otherwise not been bothered by them.  She was counseled to keep her extremities warm as much as possible, and if symptomatic, can consider the use of a CCB ie nifedipine or a nitrate-based topical if necessary.\par \par 3. While certainly can be noted in autoimmune diseases, livedo reticularis can also be a benign finding.  I do agree with the rheumatologist that all these findings in one patient may suggest the presence of an underlying condition not yet known.  May consider repeating rheumatologic panel in March 2021 -- three months from last panel to see if some of these serologies become positive.  I will review with rheumatologist.\par \par Erica, thank you for involving me in her care.\par \par Warmest regards,\par [place] Chacon\par

## 2023-11-20 NOTE — HISTORY OF PRESENT ILLNESS
----- Message from Mary Crowder [address]     
Refills sent on 09/19/23 with 5 additional refills. Verified with the pharmacy.    Patient notified.  
[FreeTextEntry1] :  Ms. Pickering is a pleasant 50 year old female presented for follow up of large pericardial effusion associated with palpitations.  that was drained a few weeks ago. \par  On Nov 13th 2020, based on ecbho findings per both Lalita and Leland reported large effusion. She was admitted to ED,  transferred to cath lab for drainage and was found o have less fluid ( not large enough to tap). She was admitted overnight for observation and d/patricia on colchicine.\par She was readmitted to ER on November 20, 2020 for rapid heart rates and ongoing pericardial effusion of unknown etiology. Patient remained at [facility] on telemetry from 11/20- 11/23. No evidence of tachycardia was identified. Images reviewed by IR and CT surgery. Both divisions felt that there was insufficient fluid to tap. \par \par Of note, she had undergone malignancy scans of her chest, abdomen and pelvis which were all negative including Rheum evaluation so far. \par \par Discussed biotel finding of SVT ( spoke w dr james) he believes likely PV tach. Suggested BBl or consideration for ablation. We discussed different options and she would like to try " pill in pocket" with short acting BBL only when needed.\par \par Patient seen and examined. feels well.

## 2023-11-30 NOTE — ED ADULT NURSE NOTE - NS ED NURSE LEVEL OF CONSCIOUSNESS AFFECT
[address] [facility], [facility] [address]  Phone: [phone]          11/30/2023  Tu Khan  1956  [id number]           Tu Khan is a 69year old female who has returned to my clinic for a follow-up visit; she was initially referred to me by Dr. Edgar Dash. In 2/2017 she was diagnosed with Iron Deficiency Anemia, etiology uncertain; her EGD, Colonoscopy and capsule video endoscopy revealed a hiatus hernia and Diverticulosis, she received parenteral Iron infusions in 4/2017, 5/2018, 11/2018, 4/2019, 1/2020 and 9/2020. ALLERGIES: Penicillin and Codeine. FAMILY HISTORY: No hematologic disorders. SOCIAL HISTORY: She is  and lives with her . She used to work for [employer]. She denies ever using any tobacco products. PAST MEDICAL HISTORY: Asthma, GERD, Osteoporosis, Irritable Bowel Syndrome, Hypertension, Depression, Hyperlipidemia, functional Vitamin B-12 deficiency, Anxiety Disorder, renal insufficiency, Vitamin D deficiency, Carpal Tunnel Syndrome, Fibromyalgia, Diverticulosis, Hypothyroidism and Iron Deficiency Anemia. ROS: The patient complained of fatigue and myagias; all other systems negative. PHYSICAL EXAM: The patient was alert, awake and oriented, no acute distress was noted. Oral examination did not reveal any mucosal lesions. Lymph node examination did not reveal any adenopathy. Neck examination revealed a supple neck, no thyromegaly or masses were noted. Chest examination revealed normal vesicular breath sounds. Heart examination revealed S-1 and S-2 without any murmurs. Abdominal examination revealed a non-tender abdomen, bowel sounds were positive, no organomegaly could be appreciated. Examination of the extremities did not reveal any tenderness or erythema. Examination of the skin did not reveal any lesions. Medical problems and test results were reviewed with the patient today. Calm

## 2024-03-05 NOTE — ED ADULT NURSE NOTE - NURSING MUSC JOINTS
Brief Operative Note    Patient: Marvin Valdivia 78 year old male    MRN: [id number]    Surgeon(s): Nirav Hylton MD  Phone Number: [phone]                       Surgeon(s) and Role:     * Nirav Hylton MD - Primary    Assistant(s): Keya Matias    Pre-Op Diagnosis: tracheal mass     Post-Op Diagnosis: No tracheal mass.  Diffuse hemorrhage     Procedure: Procedure(s):  BRONCHOSCOPY, TREAT BLOCKAGE (LASER, ELECTROCAUTERY, APC, CRYOTHERAPY    Anesthesia Type: General                                   Complications: None    Description: See op note    Findings: See op note    Specimens Removed:   ID Type Source Tests Collected by Time   1 :  Tissue Lung, Right Middle Lobe  Nirav Hylton MD 3/4/2024 2002   A : RML Bx(s). Tissue Lung, Right Middle Lobe SURGICAL PATHOLOGY Nirav Hylton MD 3/4/2024 2000        Estimated Blood Loss: removal of about 200 ml of blood     Assistant Tasks: Removing tissue     Implants: * No implants in log *      I was present for the key portions of the procedure and was immediately available for the non-key portions        
no pain, swelling or deformity of joints

## 2024-04-01 ENCOUNTER — APPOINTMENT (OUTPATIENT)
Dept: CARDIOLOGY | Facility: CLINIC | Age: 54
End: 2024-04-01
Payer: COMMERCIAL

## 2024-04-01 ENCOUNTER — APPOINTMENT (OUTPATIENT)
Dept: ELECTROPHYSIOLOGY | Facility: CLINIC | Age: 54
End: 2024-04-01
Payer: COMMERCIAL

## 2024-04-01 ENCOUNTER — NON-APPOINTMENT (OUTPATIENT)
Age: 54
End: 2024-04-01

## 2024-04-01 ENCOUNTER — RX RENEWAL (OUTPATIENT)
Age: 54
End: 2024-04-01

## 2024-04-01 VITALS
BODY MASS INDEX: 24.11 KG/M2 | SYSTOLIC BLOOD PRESSURE: 137 MMHG | WEIGHT: 150 LBS | HEIGHT: 66 IN | DIASTOLIC BLOOD PRESSURE: 84 MMHG | OXYGEN SATURATION: 100 % | HEART RATE: 73 BPM

## 2024-04-01 DIAGNOSIS — Z87.891 PERSONAL HISTORY OF NICOTINE DEPENDENCE: ICD-10-CM

## 2024-04-01 DIAGNOSIS — I48.0 PAROXYSMAL ATRIAL FIBRILLATION: ICD-10-CM

## 2024-04-01 DIAGNOSIS — I47.19 OTHER SUPRAVENTRICULAR TACHYCARDIA: ICD-10-CM

## 2024-04-01 DIAGNOSIS — I31.39 OTHER PERICARDIAL EFFUSION (NONINFLAMMATORY): ICD-10-CM

## 2024-04-01 PROCEDURE — 93000 ELECTROCARDIOGRAM COMPLETE: CPT

## 2024-04-01 PROCEDURE — 99215 OFFICE O/P EST HI 40 MIN: CPT | Mod: 25

## 2024-04-02 PROBLEM — I47.19 PAT (PAROXYSMAL ATRIAL TACHYCARDIA): Status: ACTIVE | Noted: 2020-12-16

## 2024-04-02 PROBLEM — I31.39 PERICARDIAL EFFUSION: Status: ACTIVE | Noted: 2020-11-16

## 2024-04-02 LAB
CRP SERPL HS-MCNC: 0.4 MG/L
HCT VFR BLD CALC: 44.4 %
HGB BLD-MCNC: 13.9 G/DL
MCHC RBC-ENTMCNC: 29.5 PG
MCHC RBC-ENTMCNC: 31.3 GM/DL
MCV RBC AUTO: 94.3 FL
PLATELET # BLD AUTO: 278 K/UL
RBC # BLD: 4.71 M/UL
RBC # FLD: 13.2 %
TSH SERPL-ACNC: 1.4 UIU/ML
WBC # FLD AUTO: 8.72 K/UL

## 2024-04-02 NOTE — CARDIOLOGY SUMMARY
[de-identified] : 4/1/2023 Sinus rhythm, low voltage @ 79 bpm  [de-identified] : Repeat echo today: March 13 ,2023\par  Conclusions: \par  1. Normal mitral valve. Minimal mitral regurgitation.\par  2. Mildly calcified trileaflet aortic valve with normal\par  opening. No aortic valve regurgitation seen.\par  3. Normal left ventricular systolic function. No segmental\par  wall motion abnormalities.\par  4. Normal right ventricular size and function.\par  5. Small-moderate circumferential pericardial effusion. The\par  effusion measures approximately 0.9 cm adjacent to the RV\par  apex and 1.2 cm posterior to the LV in its largest\par  dimension. The IVC is small and collapses with inspiration\par  consistent with low right atrial pressure (estimated RA\par  pressure 3 mmHg). No echocardiographic evidence of\par  pericardial tamponade.\par  *** Compared with echocardiogram of 9/12/2022, the\par  pericardial effusion has decreased in size. There was\par  evidence of decreased expansion of the right ventricle in\par  early diastole on the prior study but not present today. [de-identified] : CT CHEST November 6, 2023 Normal heart +Atherosclerotic calcifications of the coronary arteries Moderate pericardial effusion is unchanged.

## 2024-04-02 NOTE — REASON FOR VISIT
[FreeTextEntry1] : 51 yo w hx of small, chronic pericardial effusion of unknown origin. and PV SVT.  Since last visit she has been feeling well - she walks many flights of stairs and feels fine. Infrequently she will look at apple watch and HR will be fast but she is not symptomatic.  COntinues on BBL  No RUVALCABA , CP, PND, fevers or chills.  Had recent colonoscopy which went well.  tolerated conscious sedation w/o difficulty.  Interval Note March 13, 2023  Ms. Pickering is now 52 years old, former smoker  and returns for a cardiovascular follow up. She carries a medical history as outlined below: -Hx of idiopathic pericarditis -Hx of PAT -Hx of symptomatic palpitations -Hx of prediabetes -Hx of pericardial effusion  Blood pressure today:  117/77 EKG- Sinus rhythm, low voltage @ 79 bpm   Repeat echo today: March 13 ,2023 Conclusions:  1. Normal mitral valve. Minimal mitral regurgitation. 2. Mildly calcified trileaflet aortic valve with normal opening. No aortic valve regurgitation seen. 3. Normal left ventricular systolic function. No segmental wall motion abnormalities. 4. Normal right ventricular size and function. 5. Small-moderate circumferential pericardial effusion. The effusion measures approximately 0.9 cm adjacent to the RV apex and 1.2 cm posterior to the LV in its largest dimension. The IVC is small and collapses with inspiration consistent with low right atrial pressure (estimated RA pressure 3 mmHg). No echocardiographic evidence of pericardial tamponade. *** Compared with echocardiogram of 9/12/2022, the pericardial effusion has decreased in size. There was evidence of decreased expansion of the right ventricle in early diastole on the prior study but not present today.  Patient continues to feel well - she is able to climb flights of stairs without difficulty. She has noticed some intermittent elevated heart rate that is nicely controlled on BB She does report that she has not yet  returned to rheumatology to evaluate possible underlying cause of her pericardial effusion.  Interval Note October 9, 2023  Ms. Pickering is now 53 years old, former smoker and returns for a cardiovascular follow up. She carries a medical history as outlined below: -Hx of idiopathic pericarditis -Hx of PAT -Hx of symptomatic palpitations -Hx of prediabetes -Hx of pericardial effusion  Blood pressure today:  134/82 EKG- Sinus rhythm, low voltage @ 68 bpm   Interval Note April 1, 2024  Ms. Pickering is now 53 years old and returns for a cardiovascular follow up. She continues to carry a medical history as outlined below:  -Hx of idiopathic pericarditis -Hx of PAT -Hx of symptomatic palpitations -Hx of prediabetes -Hx of pericardial effusion  Blood pressure today:   137/ 84 EKG- Sinus rhythm, diffuse, low voltage @ 76 bpm  Reviewed CT of the Chest performed on November 6, 2023 Normal heart +Atherosclerotic calcifications of the coronary arteries Moderate pericardial effusion is unchanged.  Patient continues to experience symptomatic palpitations. She printed multiple episodes recorded on her apple watch today which identified paroxysmal atrial fibrillation-> heart rates running between 130s-150's.   Ms. Pickering has been evaluated by electrophysiology in the past and was started on a beta blocker for symptomatic relief. Drainage of pericardial fluid has been assessed by IR and CT surgery in the past. Both division felt that there was insufficient fluid to tap. She also underwent malignancy scans of her chest, abdomen and pelvis, which were all negative including a rheumatic evaluation.  However, palpitations have now been identified as PAF in addition to PAT, consideration for possible antiarrhythmic medication can be considered.

## 2024-04-02 NOTE — PHYSICAL EXAM
[No Rub] : no rub [No Gallop] : no gallop [Normal S1, S2] : normal S1, S2 [Normal] : alert and oriented, normal memory [de-identified] : soft HSM

## 2024-04-02 NOTE — ASSESSMENT
[FreeTextEntry1] : Ms. Pickering is now 53 years old, former smoker  and returns for a cardiovascular follow up. She carries a medical history as outlined below: -Hx of idiopathic pericarditis -Hx of PAT -Hx of symptomatic palpitations -Hx of prediabetes -Hx of pericardial effusion Newly identified PAF  #Hx of pericardial effusion   Echo -> March 13, 2023   *** Compared with echocardiogram of 9/12/2022, the pericardial effusion has decreased in size. There was evidence of decreased expansion of the right ventricle in early diastole on the prior study but not present today.  Will continue serial echo assessment annually.  #Symptomatic palpitations    Continue on Metoprolol Succinate ER 25 mg-> 1.2 pill  QD     Confirmed PAF     Will place a 2 week Biotel patch to assess frequency and duration of arrhythmia     SHAMA Vasc Score- 1     Patient does not meet criteria for anticoagulation,     Additionally, anticoagulation ->  there is concern for bleeding complications in the setting of existing pericardial     effusion.     Can consider adding Mutlaq 400 mg BID to her medical regimen to maintain sinus rhythm.   Will repeat blood work:  CBC, C-reactive protein, TSH, Sedimentation rate  Follow up post testing.  - Encouraged patient to continue with healthy walking and eating habits, focusing on a Mediterranean style of eating. - Encouraged the patient to find healthy outlets and coping mechanisms to help manage stress, such as reducing workload if possible, spending time with family and friends, engaging in an enjoyable hobby, or using meditation or mindfulness techniques.

## 2024-04-09 LAB — ERYTHROCYTE [SEDIMENTATION RATE] IN BLOOD BY WESTERGREN METHOD: 2 MM/HR

## 2024-04-16 PROCEDURE — 93228 REMOTE 30 DAY ECG REV/REPORT: CPT

## 2024-05-02 ENCOUNTER — APPOINTMENT (OUTPATIENT)
Dept: ULTRASOUND IMAGING | Facility: IMAGING CENTER | Age: 54
End: 2024-05-02
Payer: COMMERCIAL

## 2024-05-02 ENCOUNTER — OUTPATIENT (OUTPATIENT)
Dept: OUTPATIENT SERVICES | Facility: HOSPITAL | Age: 54
LOS: 1 days | End: 2024-05-02
Payer: COMMERCIAL

## 2024-05-02 ENCOUNTER — APPOINTMENT (OUTPATIENT)
Dept: MAMMOGRAPHY | Facility: IMAGING CENTER | Age: 54
End: 2024-05-02
Payer: COMMERCIAL

## 2024-05-02 DIAGNOSIS — Z00.8 ENCOUNTER FOR OTHER GENERAL EXAMINATION: ICD-10-CM

## 2024-05-02 DIAGNOSIS — Z90.49 ACQUIRED ABSENCE OF OTHER SPECIFIED PARTS OF DIGESTIVE TRACT: Chronic | ICD-10-CM

## 2024-05-02 PROCEDURE — 77067 SCR MAMMO BI INCL CAD: CPT | Mod: 26

## 2024-05-02 PROCEDURE — 77067 SCR MAMMO BI INCL CAD: CPT

## 2024-05-02 PROCEDURE — 76641 ULTRASOUND BREAST COMPLETE: CPT

## 2024-05-02 PROCEDURE — 77063 BREAST TOMOSYNTHESIS BI: CPT | Mod: 26

## 2024-05-02 PROCEDURE — 76641 ULTRASOUND BREAST COMPLETE: CPT | Mod: 26,50

## 2024-05-02 PROCEDURE — 77063 BREAST TOMOSYNTHESIS BI: CPT

## 2024-05-21 RX ORDER — METOPROLOL SUCCINATE 25 MG/1
25 TABLET, EXTENDED RELEASE ORAL
Qty: 90 | Refills: 1 | Status: ACTIVE | COMMUNITY
Start: 2021-02-09 | End: 1900-01-01

## 2024-05-21 RX ORDER — METOPROLOL TARTRATE 25 MG/1
25 TABLET, FILM COATED ORAL
Qty: 90 | Refills: 3 | Status: DISCONTINUED | COMMUNITY
Start: 2023-10-09 | End: 2024-05-21

## 2024-05-30 ENCOUNTER — APPOINTMENT (OUTPATIENT)
Dept: MAMMOGRAPHY | Facility: IMAGING CENTER | Age: 54
End: 2024-05-30

## 2024-05-30 ENCOUNTER — OUTPATIENT (OUTPATIENT)
Dept: OUTPATIENT SERVICES | Facility: HOSPITAL | Age: 54
LOS: 1 days | End: 2024-05-30

## 2024-05-30 DIAGNOSIS — Z90.49 ACQUIRED ABSENCE OF OTHER SPECIFIED PARTS OF DIGESTIVE TRACT: Chronic | ICD-10-CM

## 2024-05-30 DIAGNOSIS — Z00.8 ENCOUNTER FOR OTHER GENERAL EXAMINATION: ICD-10-CM

## 2024-06-24 ENCOUNTER — APPOINTMENT (OUTPATIENT)
Dept: GASTROENTEROLOGY | Facility: CLINIC | Age: 54
End: 2024-06-24
Payer: COMMERCIAL

## 2024-06-24 VITALS
HEIGHT: 66 IN | HEART RATE: 71 BPM | BODY MASS INDEX: 24.59 KG/M2 | WEIGHT: 153 LBS | OXYGEN SATURATION: 98 % | DIASTOLIC BLOOD PRESSURE: 81 MMHG | RESPIRATION RATE: 16 BRPM | SYSTOLIC BLOOD PRESSURE: 134 MMHG

## 2024-06-24 DIAGNOSIS — R19.5 OTHER FECAL ABNORMALITIES: ICD-10-CM

## 2024-06-24 PROCEDURE — 99213 OFFICE O/P EST LOW 20 MIN: CPT

## 2024-07-11 ENCOUNTER — NON-APPOINTMENT (OUTPATIENT)
Age: 54
End: 2024-07-11

## 2024-07-12 ENCOUNTER — APPOINTMENT (OUTPATIENT)
Dept: OTOLARYNGOLOGY | Facility: CLINIC | Age: 54
End: 2024-07-12
Payer: COMMERCIAL

## 2024-07-12 VITALS
HEIGHT: 66 IN | DIASTOLIC BLOOD PRESSURE: 84 MMHG | BODY MASS INDEX: 24.75 KG/M2 | SYSTOLIC BLOOD PRESSURE: 126 MMHG | WEIGHT: 154 LBS

## 2024-07-12 DIAGNOSIS — J34.2 DEVIATED NASAL SEPTUM: ICD-10-CM

## 2024-07-12 DIAGNOSIS — H93.293 OTHER ABNORMAL AUDITORY PERCEPTIONS, BILATERAL: ICD-10-CM

## 2024-07-12 DIAGNOSIS — R09.81 NASAL CONGESTION: ICD-10-CM

## 2024-07-12 PROCEDURE — 99204 OFFICE O/P NEW MOD 45 MIN: CPT | Mod: 25

## 2024-07-12 PROCEDURE — 92557 COMPREHENSIVE HEARING TEST: CPT

## 2024-07-12 PROCEDURE — 92567 TYMPANOMETRY: CPT

## 2024-07-12 PROCEDURE — 31231 NASAL ENDOSCOPY DX: CPT

## 2024-07-12 RX ORDER — SOD CHLOR,BICARB/SQUEEZ BOTTLE
PACKET, WITH RINSE DEVICE NASAL
Qty: 100 | Refills: 1 | Status: ACTIVE | COMMUNITY
Start: 2024-07-12 | End: 1900-01-01

## 2024-07-12 RX ORDER — TRIAMCINOLONE ACETONIDE 55 UG/1
55 SPRAY, METERED NASAL
Qty: 1 | Refills: 0 | Status: ACTIVE | COMMUNITY
Start: 2024-07-12 | End: 1900-01-01

## 2024-09-16 NOTE — DISCHARGE NOTE PROVIDER - NSDCQMCOGNITION_NEU_ALL_CORE
Preventive Care Visit  [facility]  Yvonne YUNG MD, Pediatrics  Sep 16, 2024    Assessment & Plan   16 month old, here for preventive care.    Encounter for routine child health examination w/o abnormal findings    - Lead Capillary; Future  - Hemoglobin; Future    Behind on immunizations- will update 12 month vaccines today Plan for next vaccine update at 18 months of age  Recommend 2% milk instead of whole milk  Agree with no juice as parents are already doing  Stop bottle    Growth      OFC: Normal, Length:Normal , Weight: Abnormal: high weight for length- improving.     Immunizations   Appropriate vaccinations were ordered.  I provided face to face vaccine counseling, answered questions, and explained the benefits and risks of the vaccine components ordered today including:  MMR, Pneumococcal 20- valent Conjugate (Prevnar 20), and Varicella (Chicken Pox)  Immunizations Administered       Name Date Dose VIS Date Route    MMR 9/16/24  4:10 PM 0.5 mL 08/06/2021, Given Today Subcutaneous    Pneumococcal 20 valent Conjugate (Prevnar 20) 9/16/24  4:11 PM 0.5 mL 2023, Given Today Intramuscular    Varicella 9/16/24  4:11 PM 0.5 mL 08/06/2021, Given Today Subcutaneous          Lead Screening:  Lead level ordered  Anticipatory Guidance    Reviewed age appropriate anticipatory guidance.       Referrals/Ongoing Specialty Care  None  Verbal Dental Referral: Patient has established dental home  Dental Fluoride Varnish: No, parent/guardian declines fluoride varnish.  Reason for decline: Recent/Upcoming dental appointment      Subjective   Juana is presenting for the following:  Well Child (16 month [facility])      Family has decreased milk for Juana. No juice. Still gets bottle in am and before bed  Grandparents are on board with limiting milk for Juana to help decrease speed of weight gain  Doing whole milk at this time.         9/16/2024     3:41 PM   Additional Questions   Accompanied by  mother   Questions for today's visit No   Surgery, major illness, or injury since last physical No           9/13/2024   Social   Lives with Parent(s)   Who takes care of your child? Grandparent(s)   Recent potential stressors None   History of trauma No   Family Hx mental health challenges No   Lack of transportation has limited access to appts/meds No   Do you have housing? (Housing is defined as stable permanent housing and does not include staying ouside in a car, in a tent, in an abandoned building, in an overnight shelter, or couch-surfing.) Yes   Are you worried about losing your housing? No            9/13/2024    10:05 AM   Health Risks/Safety   What type of car seat does your child use?  Car seat with harness   Is your child's car seat forward or rear facing? (!) FORWARD FACING   Where does your child sit in the car?  Back seat   Do you use space heaters, wood stove, or a fireplace in your home? (!) YES   Are poisons/cleaning supplies and medications kept out of reach? Yes   Do you have guns/firearms in the home? No         9/13/2024    10:05 AM   TB Screening   Was your child born outside of the [place]? No         9/13/2024    10:05 AM   TB Screening: Consider immunosuppression as a risk factor for TB   Recent TB infection or positive TB test in family/close contacts No   Recent travel outside [place] (child/family/close contacts) No   Recent residence in high-risk group setting (correctional facility/health care facility/homeless shelter/refugee camp) No          9/13/2024    10:05 AM   Dental Screening   Has your child had cavities in the last 2 years? No   Have parents/caregivers/siblings had cavities in the last 2 years? (!) YES, IN THE LAST 7-23 MONTHS- MODERATE RISK         9/13/2024   Diet   Questions about feeding? No   How does your child eat?  (!) BOTTLE   What does your child regularly drink? Water    Cow's Milk    (!) JUICE   What type of milk? Whole   What type of water? (!) BOTTLED    (!)  "FILTERED   Vitamin or supplement use Multi-vitamin with Iron   How often does your family eat meals together? Every day   How many snacks does your child eat per day 2   Are there types of foods your child won't eat? No   In past 12 months, concerned food might run out No   In past 12 months, food has run out/couldn't afford more No       Multiple values from one day are sorted in reverse-chronological order         9/13/2024    10:05 AM   Elimination   Bowel or bladder concerns? No concerns         9/13/2024    10:05 AM   Media Use   Hours per day of screen time (for entertainment) 1-2         9/13/2024    10:05 AM   Sleep   Do you have any concerns about your child's sleep? No concerns, regular bedtime routine and sleeps well through the night         9/13/2024    10:05 AM   Vision/Hearing   Vision or hearing concerns No concerns         9/13/2024    10:05 AM   Development/ Social-Emotional Screen   Developmental concerns No   Does your child receive any special services? No     Development    Screening tool used, reviewed with parent/guardian: No screening tool used  Milestones (by observation/exam/report) 75-90% ile  SOCIAL/EMOTIONAL:   Copies other children while playing, like taking toys out of a container when another child does   Shows you an object they like   Claps when excited   Hugs stuffed doll or other toy   Shows you affection (Hugs, cuddles or kisses you)  LANGUAGE/COMMUNICATION:   Tries to say one or two words besides \"mama\" or \"eduardo\" like \"ba\" for ball or \"da\" for dog   Looks at familiar object when you name it   Follows directions with both a gesture and words.  For example,  will give you a toy when you hold out your hand and say, \"Give me the toy\".   Points to ask for something or to get help  COGNITIVE (LEARNING, THINKING, PROBLEM-SOLVING):   Tries to use things the right way, like phone cup or book   Stacks at least two small objects, like blocks   Climbs up on chair  MOVEMENT/PHYSICAL " "DEVELOPMENT:   Takes a few steps on their own   Uses fingers to feed self some food         Objective     Exam  Pulse 124   Temp 98.1  F (36.7  C)   Ht 2' 10\" (0.864 m)   Wt 33 lb 12.5 oz (15.3 kg)   HC 19.09\" (48.5 cm)   BMI 20.55 kg/m    97 %ile (Z= 1.84) based on WHO (Girls, 0-2 years) head circumference-for-age based on Head Circumference recorded on 9/16/2024.  >99 %ile (Z= 3.35) based on WHO (Girls, 0-2 years) weight-for-age data using vitals from 9/16/2024.  >99 %ile (Z= 2.56) based on WHO (Girls, 0-2 years) Length-for-age data based on Length recorded on 9/16/2024.  >99 %ile (Z= 3.00) based on WHO (Girls, 0-2 years) weight-for-recumbent length data based on body measurements available as of 9/16/2024.    Physical Exam  GENERAL: Alert, well appearing, no distress  SKIN: diffuse dermal melanocytosis  HEAD: Normocephalic.  EYES:  Symmetric light reflex and no eye movement on cover/uncover test. Normal conjunctivae.  EARS: Normal canals. Tympanic membranes are normal; gray and translucent.  NOSE: Normal without discharge.  MOUTH/THROAT: Clear. No oral lesions. Teeth without obvious abnormalities.  NECK: Supple, no masses.  No thyromegaly.  LYMPH NODES: No adenopathy  LUNGS: Clear. No rales, rhonchi, wheezing or retractions  HEART: Regular rhythm. Normal S1/S2. No murmurs. Normal pulses.  ABDOMEN: Soft, non-tender, not distended, no masses or hepatosplenomegaly. Bowel sounds normal.   GENITALIA: Normal female external genitalia. Tom stage I,  No inguinal herniae are present.  EXTREMITIES: Full range of motion, no deformities  NEUROLOGIC: No focal findings. Cranial nerves grossly intact: DTR's normal. Normal gait, strength and tone      Prior to immunization administration, verified patients identity using patient s name and date of birth. Please see Immunization Activity for additional information.     Screening Questionnaire for Pediatric Immunization    Is the child sick today?   No   Does the child " have allergies to medications, food, a vaccine component, or latex?   No   Has the child had a serious reaction to a vaccine in the past?   No   Does the child have a long-term health problem with lung, heart, kidney or metabolic disease (e.g., diabetes), asthma, a blood disorder, no spleen, complement component deficiency, a cochlear implant, or a spinal fluid leak?  Is he/she on long-term aspirin therapy?   No   If the child to be vaccinated is 2 through 4 years of age, has a healthcare provider told you that the child had wheezing or asthma in the  past 12 months?   No   If your child is a baby, have you ever been told he or she has had intussusception?   No   Has the child, sibling or parent had a seizure, has the child had brain or other nervous system problems?   No   Does the child have cancer, leukemia, AIDS, or any immune system         problem?   No   Does the child have a parent, brother, or sister with an immune system problem?   No   In the past 3 months, has the child taken medications that affect the immune system such as prednisone, other steroids, or anticancer drugs; drugs for the treatment of rheumatoid arthritis, Crohn s disease, or psoriasis; or had radiation treatments?   No   In the past year, has the child received a transfusion of blood or blood products, or been given immune (gamma) globulin or an antiviral drug?   No   Is the child/teen pregnant or is there a chance that she could become       pregnant during the next month?   No   Has the child received any vaccinations in the past 4 weeks?   No               Immunization questionnaire answers were all negative.      Patient instructed to remain in clinic for 15 minutes afterwards, and to report any adverse reactions.     Screening performed by PAKO HERNANDEZ on 9/16/2024 at 3:46 PM.  Signed Electronically by: Yvonne YUNG MD     No difficulties

## 2024-09-26 ENCOUNTER — EMERGENCY (EMERGENCY)
Facility: HOSPITAL | Age: 54
LOS: 1 days | Discharge: HOME | End: 2024-09-26
Attending: EMERGENCY MEDICINE
Payer: COMMERCIAL

## 2024-09-26 VITALS
SYSTOLIC BLOOD PRESSURE: 109 MMHG | HEART RATE: 95 BPM | DIASTOLIC BLOOD PRESSURE: 71 MMHG | OXYGEN SATURATION: 98 % | RESPIRATION RATE: 18 BRPM

## 2024-09-26 VITALS
SYSTOLIC BLOOD PRESSURE: 148 MMHG | RESPIRATION RATE: 20 BRPM | OXYGEN SATURATION: 97 % | DIASTOLIC BLOOD PRESSURE: 92 MMHG | TEMPERATURE: 100 F | HEART RATE: 118 BPM | HEIGHT: 66 IN | WEIGHT: 143.08 LBS

## 2024-09-26 DIAGNOSIS — Z90.49 ACQUIRED ABSENCE OF OTHER SPECIFIED PARTS OF DIGESTIVE TRACT: Chronic | ICD-10-CM

## 2024-09-26 LAB
ALBUMIN SERPL ELPH-MCNC: 4.3 G/DL — SIGNIFICANT CHANGE UP (ref 3.3–5)
ALP SERPL-CCNC: 60 U/L — SIGNIFICANT CHANGE UP (ref 40–120)
ALT FLD-CCNC: 16 U/L — SIGNIFICANT CHANGE UP (ref 10–45)
ANION GAP SERPL CALC-SCNC: 17 MMOL/L — SIGNIFICANT CHANGE UP (ref 5–17)
APPEARANCE UR: CLEAR — SIGNIFICANT CHANGE UP
APTT BLD: 23.6 SEC — LOW (ref 24.5–35.6)
AST SERPL-CCNC: 22 U/L — SIGNIFICANT CHANGE UP (ref 10–40)
BACTERIA # UR AUTO: NEGATIVE /HPF — SIGNIFICANT CHANGE UP
BASOPHILS # BLD AUTO: 0.01 K/UL — SIGNIFICANT CHANGE UP (ref 0–0.2)
BASOPHILS NFR BLD AUTO: 0.1 % — SIGNIFICANT CHANGE UP (ref 0–2)
BILIRUB SERPL-MCNC: 0.4 MG/DL — SIGNIFICANT CHANGE UP (ref 0.2–1.2)
BILIRUB UR-MCNC: NEGATIVE — SIGNIFICANT CHANGE UP
BUN SERPL-MCNC: 14 MG/DL — SIGNIFICANT CHANGE UP (ref 7–23)
CALCIUM SERPL-MCNC: 9.3 MG/DL — SIGNIFICANT CHANGE UP (ref 8.4–10.5)
CAST: 0 /LPF — SIGNIFICANT CHANGE UP (ref 0–4)
CHLORIDE SERPL-SCNC: 99 MMOL/L — SIGNIFICANT CHANGE UP (ref 96–108)
CO2 SERPL-SCNC: 21 MMOL/L — LOW (ref 22–31)
COLOR SPEC: YELLOW — SIGNIFICANT CHANGE UP
CREAT SERPL-MCNC: 0.63 MG/DL — SIGNIFICANT CHANGE UP (ref 0.5–1.3)
DIFF PNL FLD: ABNORMAL
EGFR: 104 ML/MIN/1.73M2 — SIGNIFICANT CHANGE UP
EOSINOPHIL # BLD AUTO: 0 K/UL — SIGNIFICANT CHANGE UP (ref 0–0.5)
EOSINOPHIL NFR BLD AUTO: 0 % — SIGNIFICANT CHANGE UP (ref 0–6)
GAS PNL BLDV: SIGNIFICANT CHANGE UP
GI PCR PANEL: DETECTED
GLUCOSE SERPL-MCNC: 120 MG/DL — HIGH (ref 70–99)
GLUCOSE UR QL: NEGATIVE MG/DL — SIGNIFICANT CHANGE UP
HCG SERPL-ACNC: <2 MIU/ML — SIGNIFICANT CHANGE UP
HCT VFR BLD CALC: 43.3 % — SIGNIFICANT CHANGE UP (ref 34.5–45)
HGB BLD-MCNC: 14.7 G/DL — SIGNIFICANT CHANGE UP (ref 11.5–15.5)
IMM GRANULOCYTES NFR BLD AUTO: 0.2 % — SIGNIFICANT CHANGE UP (ref 0–0.9)
INR BLD: 1.26 RATIO — HIGH (ref 0.85–1.16)
KETONES UR-MCNC: 80 MG/DL
LEUKOCYTE ESTERASE UR-ACNC: NEGATIVE — SIGNIFICANT CHANGE UP
LIDOCAIN IGE QN: 13 U/L — SIGNIFICANT CHANGE UP (ref 7–60)
LYMPHOCYTES # BLD AUTO: 0.67 K/UL — LOW (ref 1–3.3)
LYMPHOCYTES # BLD AUTO: 7.6 % — LOW (ref 13–44)
MCHC RBC-ENTMCNC: 29.9 PG — SIGNIFICANT CHANGE UP (ref 27–34)
MCHC RBC-ENTMCNC: 33.9 GM/DL — SIGNIFICANT CHANGE UP (ref 32–36)
MCV RBC AUTO: 88 FL — SIGNIFICANT CHANGE UP (ref 80–100)
MONOCYTES # BLD AUTO: 0.43 K/UL — SIGNIFICANT CHANGE UP (ref 0–0.9)
MONOCYTES NFR BLD AUTO: 4.9 % — SIGNIFICANT CHANGE UP (ref 2–14)
NEUTROPHILS # BLD AUTO: 7.64 K/UL — HIGH (ref 1.8–7.4)
NEUTROPHILS NFR BLD AUTO: 87.2 % — HIGH (ref 43–77)
NITRITE UR-MCNC: NEGATIVE — SIGNIFICANT CHANGE UP
NRBC # BLD: 0 /100 WBCS — SIGNIFICANT CHANGE UP (ref 0–0)
PH UR: 6.5 — SIGNIFICANT CHANGE UP (ref 5–8)
PLATELET # BLD AUTO: 241 K/UL — SIGNIFICANT CHANGE UP (ref 150–400)
POTASSIUM SERPL-MCNC: 3.5 MMOL/L — SIGNIFICANT CHANGE UP (ref 3.5–5.3)
POTASSIUM SERPL-SCNC: 3.5 MMOL/L — SIGNIFICANT CHANGE UP (ref 3.5–5.3)
PROT SERPL-MCNC: 7.5 G/DL — SIGNIFICANT CHANGE UP (ref 6–8.3)
PROT UR-MCNC: 100 MG/DL
PROTHROM AB SERPL-ACNC: 14.3 SEC — HIGH (ref 9.9–13.4)
RBC # BLD: 4.92 M/UL — SIGNIFICANT CHANGE UP (ref 3.8–5.2)
RBC # FLD: 12.6 % — SIGNIFICANT CHANGE UP (ref 10.3–14.5)
RBC CASTS # UR COMP ASSIST: 5 /HPF — HIGH (ref 0–4)
SALMONELLA DNA STL QL NAA+PROBE: DETECTED
SODIUM SERPL-SCNC: 137 MMOL/L — SIGNIFICANT CHANGE UP (ref 135–145)
SP GR SPEC: >1.03 — HIGH (ref 1–1.03)
SQUAMOUS # UR AUTO: 3 /HPF — SIGNIFICANT CHANGE UP (ref 0–5)
UROBILINOGEN FLD QL: 1 MG/DL — SIGNIFICANT CHANGE UP (ref 0.2–1)
WBC # BLD: 8.77 K/UL — SIGNIFICANT CHANGE UP (ref 3.8–10.5)
WBC # FLD AUTO: 8.77 K/UL — SIGNIFICANT CHANGE UP (ref 3.8–10.5)
WBC UR QL: 1 /HPF — SIGNIFICANT CHANGE UP (ref 0–5)

## 2024-09-26 PROCEDURE — 36415 COLL VENOUS BLD VENIPUNCTURE: CPT

## 2024-09-26 PROCEDURE — 87077 CULTURE AEROBIC IDENTIFY: CPT

## 2024-09-26 PROCEDURE — 85610 PROTHROMBIN TIME: CPT

## 2024-09-26 PROCEDURE — 74177 CT ABD & PELVIS W/CONTRAST: CPT | Mod: 26,MC

## 2024-09-26 PROCEDURE — 96374 THER/PROPH/DIAG INJ IV PUSH: CPT | Mod: XU

## 2024-09-26 PROCEDURE — 83690 ASSAY OF LIPASE: CPT

## 2024-09-26 PROCEDURE — 82803 BLOOD GASES ANY COMBINATION: CPT

## 2024-09-26 PROCEDURE — 74177 CT ABD & PELVIS W/CONTRAST: CPT | Mod: MC

## 2024-09-26 PROCEDURE — 85025 COMPLETE CBC W/AUTO DIFF WBC: CPT

## 2024-09-26 PROCEDURE — 83605 ASSAY OF LACTIC ACID: CPT

## 2024-09-26 PROCEDURE — 86140 C-REACTIVE PROTEIN: CPT

## 2024-09-26 PROCEDURE — 87086 URINE CULTURE/COLONY COUNT: CPT

## 2024-09-26 PROCEDURE — 96375 TX/PRO/DX INJ NEW DRUG ADDON: CPT

## 2024-09-26 PROCEDURE — 85014 HEMATOCRIT: CPT

## 2024-09-26 PROCEDURE — 85018 HEMOGLOBIN: CPT

## 2024-09-26 PROCEDURE — 85652 RBC SED RATE AUTOMATED: CPT

## 2024-09-26 PROCEDURE — 84702 CHORIONIC GONADOTROPIN TEST: CPT

## 2024-09-26 PROCEDURE — 81001 URINALYSIS AUTO W/SCOPE: CPT

## 2024-09-26 PROCEDURE — 84443 ASSAY THYROID STIM HORMONE: CPT

## 2024-09-26 PROCEDURE — 82330 ASSAY OF CALCIUM: CPT

## 2024-09-26 PROCEDURE — 87045 FECES CULTURE AEROBIC BACT: CPT

## 2024-09-26 PROCEDURE — 84132 ASSAY OF SERUM POTASSIUM: CPT

## 2024-09-26 PROCEDURE — 99285 EMERGENCY DEPT VISIT HI MDM: CPT

## 2024-09-26 PROCEDURE — 82947 ASSAY GLUCOSE BLOOD QUANT: CPT

## 2024-09-26 PROCEDURE — 87507 IADNA-DNA/RNA PROBE TQ 12-25: CPT

## 2024-09-26 PROCEDURE — 84295 ASSAY OF SERUM SODIUM: CPT

## 2024-09-26 PROCEDURE — 82435 ASSAY OF BLOOD CHLORIDE: CPT

## 2024-09-26 PROCEDURE — 99285 EMERGENCY DEPT VISIT HI MDM: CPT | Mod: 25

## 2024-09-26 PROCEDURE — 85730 THROMBOPLASTIN TIME PARTIAL: CPT

## 2024-09-26 PROCEDURE — 93005 ELECTROCARDIOGRAM TRACING: CPT

## 2024-09-26 PROCEDURE — 80053 COMPREHEN METABOLIC PANEL: CPT

## 2024-09-26 RX ORDER — SODIUM CHLORIDE 9 MG/ML
1000 INJECTION INTRAMUSCULAR; INTRAVENOUS; SUBCUTANEOUS ONCE
Refills: 0 | Status: COMPLETED | OUTPATIENT
Start: 2024-09-26 | End: 2024-09-26

## 2024-09-26 RX ORDER — DICYCLOMINE HCL 20 MG
2 TABLET ORAL
Qty: 10 | Refills: 0
Start: 2024-09-26 | End: 2024-09-30

## 2024-09-26 RX ORDER — FAMOTIDINE 10 MG/ML
20 INJECTION INTRAVENOUS ONCE
Refills: 0 | Status: COMPLETED | OUTPATIENT
Start: 2024-09-26 | End: 2024-09-26

## 2024-09-26 RX ORDER — AMOXICILLIN AND CLAVULANATE POTASSIUM 250; 125 MG/1; MG/1
1 TABLET, FILM COATED ORAL ONCE
Refills: 0 | Status: COMPLETED | OUTPATIENT
Start: 2024-09-26 | End: 2024-09-26

## 2024-09-26 RX ORDER — AMOXICILLIN AND CLAVULANATE POTASSIUM 250; 125 MG/1; MG/1
1 TABLET, FILM COATED ORAL
Qty: 10 | Refills: 0
Start: 2024-09-26 | End: 2024-09-30

## 2024-09-26 RX ORDER — ACETAMINOPHEN 325 MG/1
1000 TABLET ORAL ONCE
Refills: 0 | Status: COMPLETED | OUTPATIENT
Start: 2024-09-26 | End: 2024-09-26

## 2024-09-26 RX ORDER — METOCLOPRAMIDE HCL 5 MG
10 TABLET ORAL ONCE
Refills: 0 | Status: COMPLETED | OUTPATIENT
Start: 2024-09-26 | End: 2024-09-26

## 2024-09-26 RX ORDER — ONDANSETRON 2 MG/ML
1 INJECTION, SOLUTION INTRAMUSCULAR; INTRAVENOUS
Qty: 1 | Refills: 0
Start: 2024-09-26

## 2024-09-26 RX ORDER — DIPHENHYDRAMINE HCL 50 MG
25 CAPSULE ORAL ONCE
Refills: 0 | Status: COMPLETED | OUTPATIENT
Start: 2024-09-26 | End: 2024-09-26

## 2024-09-26 RX ORDER — ONDANSETRON 2 MG/ML
4 INJECTION, SOLUTION INTRAMUSCULAR; INTRAVENOUS ONCE
Refills: 0 | Status: COMPLETED | OUTPATIENT
Start: 2024-09-26 | End: 2024-09-26

## 2024-09-26 RX ORDER — POTASSIUM CHLORIDE 10 MEQ
10 TABLET, EXT RELEASE, PARTICLES/CRYSTALS ORAL ONCE
Refills: 0 | Status: COMPLETED | OUTPATIENT
Start: 2024-09-26 | End: 2024-09-26

## 2024-09-26 RX ORDER — DICYCLOMINE HCL 20 MG
10 TABLET ORAL ONCE
Refills: 0 | Status: COMPLETED | OUTPATIENT
Start: 2024-09-26 | End: 2024-09-26

## 2024-09-26 RX ADMIN — ACETAMINOPHEN 400 MILLIGRAM(S): 325 TABLET ORAL at 14:27

## 2024-09-26 RX ADMIN — SODIUM CHLORIDE 1000 MILLILITER(S): 9 INJECTION INTRAMUSCULAR; INTRAVENOUS; SUBCUTANEOUS at 14:27

## 2024-09-26 RX ADMIN — FAMOTIDINE 20 MILLIGRAM(S): 10 INJECTION INTRAVENOUS at 14:27

## 2024-09-26 RX ADMIN — SODIUM CHLORIDE 1000 MILLILITER(S): 9 INJECTION INTRAMUSCULAR; INTRAVENOUS; SUBCUTANEOUS at 18:08

## 2024-09-26 RX ADMIN — ONDANSETRON 4 MILLIGRAM(S): 2 INJECTION, SOLUTION INTRAMUSCULAR; INTRAVENOUS at 14:28

## 2024-09-26 RX ADMIN — Medication 10 MILLIGRAM(S): at 14:39

## 2024-09-26 RX ADMIN — AMOXICILLIN AND CLAVULANATE POTASSIUM 1 TABLET(S): 250; 125 TABLET, FILM COATED ORAL at 21:58

## 2024-09-26 RX ADMIN — Medication 25 MILLIGRAM(S): at 18:06

## 2024-09-26 RX ADMIN — Medication 10 MILLIGRAM(S): at 18:08

## 2024-09-26 RX ADMIN — Medication 100 MILLIEQUIVALENT(S): at 18:48

## 2024-09-26 NOTE — ED PROVIDER NOTE - CARE PLAN
Principal Discharge DX:	Nausea vomiting and diarrhea   1 Principal Discharge DX:	Nausea vomiting and diarrhea  Secondary Diagnosis:	Colitis

## 2024-09-26 NOTE — ED PROVIDER NOTE - NSICDXPASTMEDICALHX_GEN_ALL_CORE_FT
PAST MEDICAL HISTORY:  Pericardial effusion      PAST MEDICAL HISTORY:  Chronic atrial fibrillation     Pericardial effusion

## 2024-09-26 NOTE — ED PROVIDER NOTE - PATIENT PORTAL LINK FT
You can access the FollowMyHealth Patient Portal offered by [facility] by registering at the following website: [email or web address]. By joining [facility]’s FollowMyHealth portal, you will also be able to view your health information using other applications (apps) compatible with our system.

## 2024-09-26 NOTE — ED PROVIDER NOTE - CONSTITUTIONAL, MLM
[facility]

              [address] [place] [address]

Test Date:    2019               Test Time:    11:43:34

Pat Name:     DARA MENA            Department:   

Patient ID:   [id number]           Room:         111 1

Gender:       F                        Technician:   

:          1966               Requested By: MICHAEL VALDOVINOS

Order Number: [id number]           Reading MD:   Hira Perdomo MD

                                 Measurements

Intervals                              Axis          

Rate:         56                       P:            

MI:                                    QRS:          100

QRSD:         72                       T:            130

QT:           464                                    

QTc:          454                                    

                           Interpretive Statements

PROBABLE SR

BASELINE ARTIFACT

Electronically Signed On 2019 13:44:37 CDT by Hira Perdomo MD Well appearing, awake, alert, oriented to person, place, time/situation and in no apparent distress. normal...

## 2024-09-26 NOTE — ED PROVIDER NOTE - PROGRESS NOTE DETAILS
Patient reassessed, still with significant abdominal pain. CTAP ordered as well as more pain medication. One dose of IV K ordered for borderline low K. James Velasquez MD: CTAP shows colitis. Abx ordered and given, patient tolerating PO. Will discharge with prescription.

## 2024-09-26 NOTE — ED ADULT NURSE NOTE - OBJECTIVE STATEMENT
53 y/o Female presents to ED from home with c/o abdominal pain. PMH of afib on metoprolol, pericardial effusion. Pt states she has been having about 3 days of generalized abdominal pain, vomiting and non bloody diarrhea. Endorses nausea and unable to keep PO intake down. Denies SOB, CP, back pain, urinary s/s. Pt is A&Ox3, well appearing/ speaking full sentences without difficulty. Airway patent with spontaneous unlabored breathing, Equal B/L upper and lower extremity strength, skin is warm and normal color for race. Abd soft, nondistended and tenderness to palpation. Safety maintained bed in lowest position and locked

## 2024-09-26 NOTE — ED PROVIDER NOTE - NSFOLLOWUPINSTRUCTIONS_ED_ALL_ED_FT
You may continue to experience pain after being discharged.  For your pain, you can take 2 tablets (1000 mg) of acetaminophen (brand name Tylenol®) every 6 hours.  If you still have pain, you can also take 2 tablets (400 mg) of ibuprofen (brand names Motrin® or Advil®) every 6 hours.  For better pain control, it is recommended that you alternate these medications every 3 hours.  You should not take more than 4 doses of each medication in a 24-hour period.    You have been sent one or more prescriptions to your pharmacy. The dosing and frequency are included in this paperwork. Please take each medication as instructed.    Stay well hydrated. Consider an electrolyte drink for better replacement.    Return to the emergency department for severe pain, persistent vomiting, other concerning symptoms.

## 2024-09-26 NOTE — ED PROVIDER NOTE - OBJECTIVE STATEMENT
53 y/o F with PMHx of A-fib on metoprolol and pericardial effusion presenting with diffuse abdominal pain, palpitations, 5-6 episodes of non-bloody and bilious emesis and 10 episodes of non-bloody diarrhea starting 3 days ago. She reports having mild diarrhea for the past two months but her symptoms have progressively worsened recently. Her  also had diarrhea at the onset of her loose bowel movements. She endorses nausea, 5 lb weight loss in the past few days, diaphoresis, dehydration and fevers/chills. She has no other sick contacts or recent travel.   Her pericardial effusion work-up for malignancy and rheumatology was negative, and was told she has idiopathic etiology. The last echo was 2 years ago and it showed small pericardial effusion again.

## 2024-09-26 NOTE — ED PROVIDER NOTE - CLINICAL SUMMARY MEDICAL DECISION MAKING FREE TEXT BOX
55 y/o F with chronic A-fib on metoprolol, not on anticoagulation, Hx of idiopathic pericardial effusion. Work-up negative for other etiologies. Presented today with non-bloody diarrhea for the last  2 months, nausea, vomiting, but now for the last 3 days. her symptoms have gotten worse. No history of recent traveling. No  fever/chills, blood in stool. Pt hemodynamically stable. Will obtain blood work-up, GI PCR, IV hydration and re-assess.ZR

## 2024-09-27 PROBLEM — I48.20 CHRONIC ATRIAL FIBRILLATION, UNSPECIFIED: Chronic | Status: ACTIVE | Noted: 2024-09-26

## 2024-09-27 LAB
ERYTHROCYTE [SEDIMENTATION RATE] IN BLOOD: 21 MM/HR — HIGH (ref 0–20)
T4 FREE+ TSH PNL SERPL: 0.46 UIU/ML — SIGNIFICANT CHANGE UP (ref 0.27–4.2)

## 2024-09-27 NOTE — ED POST DISCHARGE NOTE - ADDITIONAL DOCUMENTATION
9/27/24: Received call from lab +Salmonella, patient discharged home from ER with Augmentin. Will speak with admin PA for follow up this morning. -Juanita García PA-C

## 2024-09-28 LAB
CULTURE RESULTS: SIGNIFICANT CHANGE UP
SPECIMEN SOURCE: SIGNIFICANT CHANGE UP

## 2024-09-29 LAB
CULTURE RESULTS: ABNORMAL
SPECIMEN SOURCE: SIGNIFICANT CHANGE UP

## 2024-10-21 ENCOUNTER — APPOINTMENT (OUTPATIENT)
Dept: GASTROENTEROLOGY | Facility: CLINIC | Age: 54
End: 2024-10-21
Payer: COMMERCIAL

## 2024-10-21 VITALS
HEIGHT: 66 IN | BODY MASS INDEX: 23.63 KG/M2 | DIASTOLIC BLOOD PRESSURE: 76 MMHG | WEIGHT: 147 LBS | HEART RATE: 79 BPM | SYSTOLIC BLOOD PRESSURE: 135 MMHG | OXYGEN SATURATION: 100 %

## 2024-10-21 DIAGNOSIS — R93.2 ABNORMAL FINDINGS ON DIAGNOSTIC IMAGING OF LIVER AND BILIARY TRACT: ICD-10-CM

## 2024-10-21 DIAGNOSIS — R19.5 OTHER FECAL ABNORMALITIES: ICD-10-CM

## 2024-10-21 PROCEDURE — 99213 OFFICE O/P EST LOW 20 MIN: CPT

## 2024-11-26 ENCOUNTER — APPOINTMENT (OUTPATIENT)
Dept: MRI IMAGING | Facility: CLINIC | Age: 54
End: 2024-11-26

## 2024-11-26 ENCOUNTER — OUTPATIENT (OUTPATIENT)
Dept: OUTPATIENT SERVICES | Facility: HOSPITAL | Age: 54
LOS: 1 days | End: 2024-11-26
Payer: COMMERCIAL

## 2024-11-26 DIAGNOSIS — Z00.8 ENCOUNTER FOR OTHER GENERAL EXAMINATION: ICD-10-CM

## 2024-11-26 DIAGNOSIS — Z90.49 ACQUIRED ABSENCE OF OTHER SPECIFIED PARTS OF DIGESTIVE TRACT: Chronic | ICD-10-CM

## 2024-11-26 PROCEDURE — 74183 MRI ABD W/O CNTR FLWD CNTR: CPT | Mod: 26

## 2024-11-26 PROCEDURE — A9585: CPT

## 2024-11-26 PROCEDURE — 74183 MRI ABD W/O CNTR FLWD CNTR: CPT

## 2024-12-16 ENCOUNTER — RX RENEWAL (OUTPATIENT)
Age: 54
End: 2024-12-16

## 2025-05-09 DIAGNOSIS — I30.0 ACUTE NONSPECIFIC IDIOPATHIC PERICARDITIS: ICD-10-CM

## 2025-05-09 DIAGNOSIS — R73.03 PREDIABETES.: ICD-10-CM

## 2025-06-02 ENCOUNTER — APPOINTMENT (OUTPATIENT)
Dept: CARDIOLOGY | Facility: CLINIC | Age: 55
End: 2025-06-02
Payer: COMMERCIAL

## 2025-06-02 ENCOUNTER — NON-APPOINTMENT (OUTPATIENT)
Age: 55
End: 2025-06-02

## 2025-06-02 VITALS
DIASTOLIC BLOOD PRESSURE: 85 MMHG | OXYGEN SATURATION: 100 % | SYSTOLIC BLOOD PRESSURE: 143 MMHG | WEIGHT: 163 LBS | BODY MASS INDEX: 26.31 KG/M2 | HEART RATE: 90 BPM

## 2025-06-02 DIAGNOSIS — E78.5 HYPERLIPIDEMIA, UNSPECIFIED: ICD-10-CM

## 2025-06-02 DIAGNOSIS — R73.03 PREDIABETES.: ICD-10-CM

## 2025-06-02 DIAGNOSIS — R07.9 CHEST PAIN, UNSPECIFIED: ICD-10-CM

## 2025-06-02 DIAGNOSIS — R00.2 PALPITATIONS: ICD-10-CM

## 2025-06-02 DIAGNOSIS — I48.0 PAROXYSMAL ATRIAL FIBRILLATION: ICD-10-CM

## 2025-06-02 DIAGNOSIS — I31.39 OTHER PERICARDIAL EFFUSION (NONINFLAMMATORY): ICD-10-CM

## 2025-06-02 DIAGNOSIS — I30.0 ACUTE NONSPECIFIC IDIOPATHIC PERICARDITIS: ICD-10-CM

## 2025-06-02 PROCEDURE — 99214 OFFICE O/P EST MOD 30 MIN: CPT | Mod: 25

## 2025-06-02 PROCEDURE — 93000 ELECTROCARDIOGRAM COMPLETE: CPT

## 2025-06-02 RX ORDER — METOPROLOL TARTRATE 25 MG/1
25 TABLET ORAL
Qty: 30 | Refills: 0 | Status: ACTIVE | COMMUNITY
Start: 2025-06-02 | End: 1900-01-01

## 2025-06-04 ENCOUNTER — NON-APPOINTMENT (OUTPATIENT)
Age: 55
End: 2025-06-04

## 2025-07-03 ENCOUNTER — APPOINTMENT (OUTPATIENT)
Dept: INTERNAL MEDICINE | Facility: CLINIC | Age: 55
End: 2025-07-03
Payer: COMMERCIAL

## 2025-07-03 VITALS
WEIGHT: 158 LBS | OXYGEN SATURATION: 98 % | HEIGHT: 66 IN | TEMPERATURE: 98.4 F | HEART RATE: 85 BPM | DIASTOLIC BLOOD PRESSURE: 79 MMHG | RESPIRATION RATE: 15 BRPM | BODY MASS INDEX: 25.39 KG/M2 | SYSTOLIC BLOOD PRESSURE: 129 MMHG

## 2025-07-03 PROCEDURE — 99214 OFFICE O/P EST MOD 30 MIN: CPT | Mod: 25

## 2025-07-03 PROCEDURE — 99386 PREV VISIT NEW AGE 40-64: CPT

## 2025-07-04 PROBLEM — M79.673 HEEL PAIN: Status: ACTIVE | Noted: 2025-07-04

## 2025-07-04 PROBLEM — R35.0 URINARY FREQUENCY: Status: ACTIVE | Noted: 2025-07-04

## 2025-07-10 ENCOUNTER — RX RENEWAL (OUTPATIENT)
Age: 55
End: 2025-07-10

## 2025-07-22 ENCOUNTER — APPOINTMENT (OUTPATIENT)
Dept: RADIOLOGY | Facility: IMAGING CENTER | Age: 55
End: 2025-07-22
Payer: COMMERCIAL

## 2025-07-22 ENCOUNTER — OUTPATIENT (OUTPATIENT)
Dept: OUTPATIENT SERVICES | Facility: HOSPITAL | Age: 55
LOS: 1 days | End: 2025-07-22
Payer: COMMERCIAL

## 2025-07-22 ENCOUNTER — APPOINTMENT (OUTPATIENT)
Dept: ULTRASOUND IMAGING | Facility: IMAGING CENTER | Age: 55
End: 2025-07-22
Payer: COMMERCIAL

## 2025-07-22 ENCOUNTER — APPOINTMENT (OUTPATIENT)
Dept: MAMMOGRAPHY | Facility: IMAGING CENTER | Age: 55
End: 2025-07-22
Payer: COMMERCIAL

## 2025-07-22 DIAGNOSIS — Z90.49 ACQUIRED ABSENCE OF OTHER SPECIFIED PARTS OF DIGESTIVE TRACT: Chronic | ICD-10-CM

## 2025-07-22 DIAGNOSIS — Z00.8 ENCOUNTER FOR OTHER GENERAL EXAMINATION: ICD-10-CM

## 2025-07-22 PROCEDURE — 77067 SCR MAMMO BI INCL CAD: CPT | Mod: 26

## 2025-07-22 PROCEDURE — 77063 BREAST TOMOSYNTHESIS BI: CPT

## 2025-07-22 PROCEDURE — 77063 BREAST TOMOSYNTHESIS BI: CPT | Mod: 26

## 2025-07-22 PROCEDURE — 77080 DXA BONE DENSITY AXIAL: CPT | Mod: 26

## 2025-07-22 PROCEDURE — 77067 SCR MAMMO BI INCL CAD: CPT

## 2025-07-22 PROCEDURE — 76641 ULTRASOUND BREAST COMPLETE: CPT

## 2025-07-22 PROCEDURE — 77080 DXA BONE DENSITY AXIAL: CPT

## 2025-07-22 PROCEDURE — 76641 ULTRASOUND BREAST COMPLETE: CPT | Mod: 26,50

## 2025-08-08 ENCOUNTER — RX RENEWAL (OUTPATIENT)
Age: 55
End: 2025-08-08

## 2025-08-12 ENCOUNTER — RX RENEWAL (OUTPATIENT)
Age: 55
End: 2025-08-12

## 2025-08-27 ENCOUNTER — OUTPATIENT (OUTPATIENT)
Dept: OUTPATIENT SERVICES | Facility: HOSPITAL | Age: 55
LOS: 1 days | End: 2025-08-27
Payer: COMMERCIAL

## 2025-08-27 ENCOUNTER — RESULT REVIEW (OUTPATIENT)
Age: 55
End: 2025-08-27

## 2025-08-27 ENCOUNTER — APPOINTMENT (OUTPATIENT)
Dept: CV DIAGNOSITCS | Facility: HOSPITAL | Age: 55
End: 2025-08-27

## 2025-08-27 DIAGNOSIS — I25.10 ATHEROSCLEROTIC HEART DISEASE OF NATIVE CORONARY ARTERY WITHOUT ANGINA PECTORIS: ICD-10-CM

## 2025-08-27 DIAGNOSIS — Z90.49 ACQUIRED ABSENCE OF OTHER SPECIFIED PARTS OF DIGESTIVE TRACT: Chronic | ICD-10-CM

## 2025-08-27 PROCEDURE — 76376 3D RENDER W/INTRP POSTPROCES: CPT | Mod: 26

## 2025-08-27 PROCEDURE — 93356 MYOCRD STRAIN IMG SPCKL TRCK: CPT

## 2025-08-27 PROCEDURE — 93306 TTE W/DOPPLER COMPLETE: CPT | Mod: 26
